# Patient Record
Sex: FEMALE | Race: WHITE | NOT HISPANIC OR LATINO | Employment: OTHER | URBAN - METROPOLITAN AREA
[De-identification: names, ages, dates, MRNs, and addresses within clinical notes are randomized per-mention and may not be internally consistent; named-entity substitution may affect disease eponyms.]

---

## 2021-04-15 LAB — SARS-COV-2 RNA RESP QL NAA+PROBE: NEGATIVE

## 2022-01-06 ENCOUNTER — HISTORICAL (OUTPATIENT)
Dept: RADIOLOGY | Facility: HOSPITAL | Age: 62
End: 2022-01-06

## 2022-01-31 ENCOUNTER — HISTORICAL (OUTPATIENT)
Dept: RADIOLOGY | Facility: HOSPITAL | Age: 62
End: 2022-01-31

## 2022-01-31 ENCOUNTER — HISTORICAL (OUTPATIENT)
Dept: ADMINISTRATIVE | Facility: HOSPITAL | Age: 62
End: 2022-01-31

## 2022-04-10 ENCOUNTER — HISTORICAL (OUTPATIENT)
Dept: ADMINISTRATIVE | Facility: HOSPITAL | Age: 62
End: 2022-04-10
Payer: COMMERCIAL

## 2022-04-25 VITALS
DIASTOLIC BLOOD PRESSURE: 75 MMHG | WEIGHT: 256.19 LBS | BODY MASS INDEX: 40.21 KG/M2 | HEIGHT: 67 IN | OXYGEN SATURATION: 97 % | SYSTOLIC BLOOD PRESSURE: 116 MMHG

## 2022-05-16 ENCOUNTER — TELEPHONE (OUTPATIENT)
Dept: SURGERY | Facility: CLINIC | Age: 62
End: 2022-05-16

## 2022-06-02 ENCOUNTER — HOSPITAL ENCOUNTER (OUTPATIENT)
Dept: RADIOLOGY | Facility: HOSPITAL | Age: 62
Discharge: HOME | End: 2022-06-02
Attending: FAMILY MEDICINE
Payer: COMMERCIAL

## 2022-06-02 DIAGNOSIS — M54.50 LOW BACK PAIN: ICD-10-CM

## 2022-06-02 PROCEDURE — 72100 X-RAY EXAM L-S SPINE 2/3 VWS: CPT | Mod: TC

## 2022-06-09 DIAGNOSIS — M54.16 RADICULOPATHY, LUMBAR REGION: Primary | ICD-10-CM

## 2022-06-13 NOTE — H&P (VIEW-ONLY)
"[facility] Breast Surg  Breast Surgical Oncology  Follow-Up Patient Office Visit       Referring Provider: No ref. provider found  PCP: Kalli Blanchard MD   Care Team: No care team member to display No care team member to display     Chief Complaint:   Chief Complaint   Patient presents with    Follow-up    Breast Mass     Followup for right breast mass, nipple discharge, clear yellowish, pt stated that she noticed a bloody discharge x 1 week ago, no pain        Subjective:   Treatment History:  None      Interval History:  2022 - Marina Dixon presents today for follow up for right nipple discharge. She still has some drainage from the right nipple. It usually is clear/yellow, but states the last time she checked it was bloody.    HPI:  Marina Dixon initially presented on 2020 with concern for right nipple discharge. She states she first noticed clear right nipple discharge about 1-2 months prior. It is not spontaneous and requires manipulation. She expressed the discharge from the nipple and it became bloody (or what she thought was blood). She states she was seen and evaluated by Dr. Santos (at this time not able to express any discharge) who recommended an ultrasound. She has DG MG and US of the right breast at [facility] on 20 and they were both negative for any findings. She had not had discharge since the first occurrence until a few days after her mammogram at [facility]. She has discharge after she "squeezed the nipple".      Imagin. 2020 BL SC MF at [facility] - which revealed scattered fibroglandular densities, benign calcifications and no suspicious masses, calcifications, or other findings. BIRADS-2: benign.  2. 2020 R DG MG/ US at [facility] - which revealed heterogenously dense breast tissue. No suspicious masses, calcifications, or densities are seen including the subareolar region. Right US did not reveal " any suspicious cystic or solid masses or ductal structures seen in the subareolar right breast. Lymph nodes are normal in the right axilla. No evidence of breast malignancy. No interval changes in appearance. BIRADS 2- Benign  3. 1/5/2021 BL DG MG / R US at [facility] - which revealed no concerning masses, calcifications, or distortions in either breast. R US revealed no evidence of ductal dilatation, ectasia, cyst, mass, or suspicious findings. BI-RADS 1: negative  4. 1/6/2022 BL DG MG/R US BREAST LIMITED at [facility] - which revealed on MG there is no correlate for the right nipple discharge. No significant masses, calcifications, or other findings are seen in either breast. There has been no significant interval change. On R US there is a 1.1 x 0.2 x 0.2 cm solid vascular hypoechoic intraductal mass in the subareolar position. The proximal section of the duct in which the solid mass resides is dilated with anechoic fluid leading up to the nipple. This correlates with the patient's right nipple discharge and is suspicious. No suspicious right axillary adenopathy. BIRADS-4 suspicious; biopsy recommended.        Pathology:  1. 1/31/2022 Ultrasound-guided Core Needle Biopsy of Right Breast Subareolar Intraductal Mass Posterior to Nipple - Non-proliferative fibrocystic changes, mild, non-specific. No evidence of atypia or malignancy      OB/GYN History:  Menarche Onset: 10  Menopause: Post, at age: 42  Hormonal birth control (duration): 18 years  Pregnancies: 2  Age at first child birth: 23  Child births: 2  Hysterectomy: yes, at age 42  Oophorectomy: yes, at age 42  HRT: HRT starting at 42 and stopping at 60 (estrogen alone)      Other Relevant History:  # of breast biopsies (when and pathology results): none  MG breast density: Category B (Scattered fibroglandular)  Prior thoracic RT: none  Genetic testing: none  Ashkenazi Sikhism descent: No      Family History:  - Father: prostate cancer (64)  - Brother: Prostate  (69)  - Brother: Kidney cancer (45)       Past History:  Past Medical History:   Diagnosis Date    Endometriosis of uterus 2002    Hypertension 2020    Menopause 2002    Hysterectomy        Past Surgical History:   Procedure Laterality Date    HYSTERECTOMY  2002        Social History     Socioeconomic History    Marital status:    Tobacco Use    Smoking status: Never Smoker    Smokeless tobacco: Never Used   Substance and Sexual Activity    Alcohol use: Never    Drug use: Never    Sexual activity: Yes     Partners: Male     Birth control/protection: None        Body mass index is 40.42 kg/m².     Allergy/Medications:   Review of patient's allergies indicates:   Allergen Reactions    Sulfa (sulfonamide antibiotics) Itching and Rash          Current Outpatient Medications:     cholecalciferol, vitamin D3, 125 mcg (5,000 unit) capsule, as directed, Disp: , Rfl:     conjugated estrogens (PREMARIN) vaginal cream, as directed, Disp: , Rfl:     docusate sodium (COLACE) 100 MG capsule, 1 capsule as needed, Disp: , Rfl:     DULoxetine (CYMBALTA) 60 MG capsule, Take 1 capsule by mouth once daily., Disp: , Rfl:     gabapentin (NEURONTIN) 600 MG tablet, 2 tablet, Disp: , Rfl:     indomethacin (INDOCIN SR) 75 mg CpSR CR capsule, Take 1 capsule by mouth 2 (two) times daily., Disp: , Rfl:     L.acidoph,plant-B.animal,long (PROBIOTIC ACIDOPHILUS BEADS) 2 billion cell Cap, , Disp: , Rfl:     methocarbamoL (ROBAXIN) 500 MG Tab, TAKE ONE TABLET BY MOUTH EVERY 8 HOURS FOR 15 DAYS, Disp: , Rfl:     mupirocin (BACTROBAN) 2 % ointment, 1 application, Disp: , Rfl:     omega 3-dha-epa-fish oil 1,000 mg (120 mg-180 mg) Cap, , Disp: , Rfl:     omeprazole (PRILOSEC) 20 MG capsule, 1 capsule 30 minutes before morning meal, Disp: , Rfl:     pilocarpine (SALAGEN) 5 MG Tab, 1 tablet, Disp: , Rfl:     polyethylene glycol (GLYCOLAX) 17 gram/dose powder, , Disp: , Rfl:     triamterene-hydrochlorothiazide 37.5-25 mg  "(MAXZIDE-25) 37.5-25 mg per tablet, Take 1 tablet by mouth every morning., Disp: , Rfl:        Review of Systems:  Constitutional: denies fevers, chills, weight loss  HEENT: denies blurry/double vision, changes in hearing, odynophagia, dysphagia  Respiratory: denies cough, shortness of breath  Cardiovascular: denies palpitations, swelling of the extremities  GI: denies abdominal pain, nausea/vomiting, hematochezia, frequent stools  : denies frequency, dysuria, flank pain, hematuria  Skin: denies new rashes  Neurological: denies muscular/sensory deficiencies, loss of coordination, headaches, memory changes  Endo: denies hair loss/thinning, nervousness, hot flashes, heat/cold intolerance, lumps in the neck area  Heme: denies easy bruising and fatigue  Psychological: denies anxious/depressive moods  Musculoskeletal: denies bony pain, muscle cramps, swollen joints    Objective:     Vitals:  Blood pressure 134/76, pulse 78, temperature 98.6 °F (37 °C), temperature source Oral, resp. rate 20, height 5' 6" (1.676 m), weight 113.6 kg (250 lb 6.4 oz), SpO2 96 %.      Physical Exam:  General: The patient is awake, alert and oriented times three. The patient is well nourished and in no acute distress.   Neck: There is no evidence of palpable cervical, supraclavicular or axillary adenopathy. The neck is supple. The thyroid is not enlarged.   Musculoskeletal: The patient has a normal range of motion of her bilateral upper extremities.   Chest: Examination of the chest wall fails to reveal any obvious abnormalities.  The lungs are clear to auscultation bilaterally without rales, rhonchi, or wheezing.   Cardiovascular: The heart has a regular rate and rhythm without murmurs, gallops or rubs.   Breast: Examination of right breast fails to reveal any dominant masses or areas of significant focal nodularity. The nipple has a single duct with yellow clear discharge. There is no skin dimpling with movement of the pectoralis. There is " "a small resolving hematoma from recent biopsy. Examination of the left breast fails to reveal any dominant masses or areas of significant focal nodularity. The nipple is everted without evidence of discharge. There is no skin dimpling with movement of the pectoralis. There is no significant skin changes overlying the breast.  Abdomen: The abdomen is soft, flat, nontender and nondistended with no palpable masses or organomegaly.  Integumentary: no rashes or skin lesions present  Neurologic: cranial nerves intact, no signs of peripheral neurological deficit, motor/sensory function intact    Assessment and Plan:     Encounter Diagnoses   Name Primary?    Bloody discharge from right nipple Yes    Fibrocystic breast changes of both breasts       Marina Dixon initially presented on 8/18/2020 with concern for right nipple discharge. She states she first noticed clear right nipple discharge about 1-2 months prior. It is not spontaneous and requires manipulation. She expressed the discharge from the nipple and it became bloody (or what she thought was blood). She states she was seen and evaluated by Dr. Santos (at this time not able to express any discharge) who recommended an ultrasound. She has DG MG and US of the right breast at [facility] on 7/27/20 and they were both negative for any findings. She had not had discharge since the first occurrence until a few days after her mammogram at [facility]. She has discharge after she "squeezed the nipple".        She is now status post core biosy revealing benign fibrocystic findings. She still continues to have nipple discharge.    At this time, the recommendation is for a major duct excision via localization with lacrimal probe. We discussed the surgical procedure in detail. The general operative procedure of mastectomy and lumpectomy were discussed in detail. The surgical complications of either procedure, i.e., pain, bleeding, hematoma, seroma, " potential need for additional surgery, and infection were addressed in detail.     We discussed this being an outpatient procedure.        Plan:     1. Surgery - Right breast Major duct excision for right nipple discharge  Tentative Date: 6/27/2022  2. Preop - BMP, CBC, EKG  3. Surgical policies and instructions discussed    All of questions were answered    Sintia Red MD  Breast Surgical Oncology     OFFICE VISIT CODING:    Non-face-to-face time included:  Yes Preparing to see the patient such as reviewing the patient record  Yes Obtaining and reviewing separately obtained history  No Independently interpreting results  Yes Documenting clinical information in electronic health record  No Ordering appropriate medications  Yes Ordering appropriate tests  Yes Ordering appropriate procedures (including follow-up)  No Referring and communicating with other health care professionals (not separately reported)  Yes Care Coordination (not separately reported)    Face-to-face time included:  Yes Performing a medically necessary appropriate history, examination, and/or evaluation  Yes Communicating results to the patient/family/caregiver  Yes Counseling and educating the patient/family/caregiver  Yes Answering patient/family/caregiver questions    Total Time: 41 minutes    Total time includes both face-to-face and non-face-to-face time personally spent by myself on the day of the visit.

## 2022-06-13 NOTE — PROGRESS NOTES
"[facility] Breast Surg  Breast Surgical Oncology  Follow-Up Patient Office Visit       Referring Provider: No ref. provider found  PCP: Kalli Blanchard MD   Care Team: No care team member to display No care team member to display     Chief Complaint:   Chief Complaint   Patient presents with    Follow-up    Breast Mass     Followup for right breast mass, nipple discharge, clear yellowish, pt stated that she noticed a bloody discharge x 1 week ago, no pain        Subjective:   Treatment History:  None      Interval History:  2022 - Marina Dixon presents today for follow up for right nipple discharge. She still has some drainage from the right nipple. It usually is clear/yellow, but states the last time she checked it was bloody.    HPI:  Marina Dixon initially presented on 2020 with concern for right nipple discharge. She states she first noticed clear right nipple discharge about 1-2 months prior. It is not spontaneous and requires manipulation. She expressed the discharge from the nipple and it became bloody (or what she thought was blood). She states she was seen and evaluated by Dr. Santos (at this time not able to express any discharge) who recommended an ultrasound. She has DG MG and US of the right breast at [facility] on 20 and they were both negative for any findings. She had not had discharge since the first occurrence until a few days after her mammogram at [facility]. She has discharge after she "squeezed the nipple".      Imagin. 2020 BL SC MF at [facility] - which revealed scattered fibroglandular densities, benign calcifications and no suspicious masses, calcifications, or other findings. BIRADS-2: benign.  2. 2020 R DG MG/ US at [facility] - which revealed heterogenously dense breast tissue. No suspicious masses, calcifications, or densities are seen including the subareolar region. Right US did not reveal " any suspicious cystic or solid masses or ductal structures seen in the subareolar right breast. Lymph nodes are normal in the right axilla. No evidence of breast malignancy. No interval changes in appearance. BIRADS 2- Benign  3. 1/5/2021 BL DG MG / R US at [facility] - which revealed no concerning masses, calcifications, or distortions in either breast. R US revealed no evidence of ductal dilatation, ectasia, cyst, mass, or suspicious findings. BI-RADS 1: negative  4. 1/6/2022 BL DG MG/R US BREAST LIMITED at [facility] - which revealed on MG there is no correlate for the right nipple discharge. No significant masses, calcifications, or other findings are seen in either breast. There has been no significant interval change. On R US there is a 1.1 x 0.2 x 0.2 cm solid vascular hypoechoic intraductal mass in the subareolar position. The proximal section of the duct in which the solid mass resides is dilated with anechoic fluid leading up to the nipple. This correlates with the patient's right nipple discharge and is suspicious. No suspicious right axillary adenopathy. BIRADS-4 suspicious; biopsy recommended.        Pathology:  1. 1/31/2022 Ultrasound-guided Core Needle Biopsy of Right Breast Subareolar Intraductal Mass Posterior to Nipple - Non-proliferative fibrocystic changes, mild, non-specific. No evidence of atypia or malignancy      OB/GYN History:  Menarche Onset: 10  Menopause: Post, at age: 42  Hormonal birth control (duration): 18 years  Pregnancies: 2  Age at first child birth: 23  Child births: 2  Hysterectomy: yes, at age 42  Oophorectomy: yes, at age 42  HRT: HRT starting at 42 and stopping at 60 (estrogen alone)      Other Relevant History:  # of breast biopsies (when and pathology results): none  MG breast density: Category B (Scattered fibroglandular)  Prior thoracic RT: none  Genetic testing: none  Ashkenazi Synagogue descent: No      Family History:  - Father: prostate cancer (64)  - Brother: Prostate  (69)  - Brother: Kidney cancer (45)       Past History:  Past Medical History:   Diagnosis Date    Endometriosis of uterus 2002    Hypertension 2020    Menopause 2002    Hysterectomy        Past Surgical History:   Procedure Laterality Date    HYSTERECTOMY  2002        Social History     Socioeconomic History    Marital status:    Tobacco Use    Smoking status: Never Smoker    Smokeless tobacco: Never Used   Substance and Sexual Activity    Alcohol use: Never    Drug use: Never    Sexual activity: Yes     Partners: Male     Birth control/protection: None        Body mass index is 40.42 kg/m².     Allergy/Medications:   Review of patient's allergies indicates:   Allergen Reactions    Sulfa (sulfonamide antibiotics) Itching and Rash          Current Outpatient Medications:     cholecalciferol, vitamin D3, 125 mcg (5,000 unit) capsule, as directed, Disp: , Rfl:     conjugated estrogens (PREMARIN) vaginal cream, as directed, Disp: , Rfl:     docusate sodium (COLACE) 100 MG capsule, 1 capsule as needed, Disp: , Rfl:     DULoxetine (CYMBALTA) 60 MG capsule, Take 1 capsule by mouth once daily., Disp: , Rfl:     gabapentin (NEURONTIN) 600 MG tablet, 2 tablet, Disp: , Rfl:     indomethacin (INDOCIN SR) 75 mg CpSR CR capsule, Take 1 capsule by mouth 2 (two) times daily., Disp: , Rfl:     L.acidoph,plant-B.animal,long (PROBIOTIC ACIDOPHILUS BEADS) 2 billion cell Cap, , Disp: , Rfl:     methocarbamoL (ROBAXIN) 500 MG Tab, TAKE ONE TABLET BY MOUTH EVERY 8 HOURS FOR 15 DAYS, Disp: , Rfl:     mupirocin (BACTROBAN) 2 % ointment, 1 application, Disp: , Rfl:     omega 3-dha-epa-fish oil 1,000 mg (120 mg-180 mg) Cap, , Disp: , Rfl:     omeprazole (PRILOSEC) 20 MG capsule, 1 capsule 30 minutes before morning meal, Disp: , Rfl:     pilocarpine (SALAGEN) 5 MG Tab, 1 tablet, Disp: , Rfl:     polyethylene glycol (GLYCOLAX) 17 gram/dose powder, , Disp: , Rfl:     triamterene-hydrochlorothiazide 37.5-25 mg  "(MAXZIDE-25) 37.5-25 mg per tablet, Take 1 tablet by mouth every morning., Disp: , Rfl:        Review of Systems:  Constitutional: denies fevers, chills, weight loss  HEENT: denies blurry/double vision, changes in hearing, odynophagia, dysphagia  Respiratory: denies cough, shortness of breath  Cardiovascular: denies palpitations, swelling of the extremities  GI: denies abdominal pain, nausea/vomiting, hematochezia, frequent stools  : denies frequency, dysuria, flank pain, hematuria  Skin: denies new rashes  Neurological: denies muscular/sensory deficiencies, loss of coordination, headaches, memory changes  Endo: denies hair loss/thinning, nervousness, hot flashes, heat/cold intolerance, lumps in the neck area  Heme: denies easy bruising and fatigue  Psychological: denies anxious/depressive moods  Musculoskeletal: denies bony pain, muscle cramps, swollen joints    Objective:     Vitals:  Blood pressure 134/76, pulse 78, temperature 98.6 °F (37 °C), temperature source Oral, resp. rate 20, height 5' 6" (1.676 m), weight 113.6 kg (250 lb 6.4 oz), SpO2 96 %.      Physical Exam:  General: The patient is awake, alert and oriented times three. The patient is well nourished and in no acute distress.   Neck: There is no evidence of palpable cervical, supraclavicular or axillary adenopathy. The neck is supple. The thyroid is not enlarged.   Musculoskeletal: The patient has a normal range of motion of her bilateral upper extremities.   Chest: Examination of the chest wall fails to reveal any obvious abnormalities.  The lungs are clear to auscultation bilaterally without rales, rhonchi, or wheezing.   Cardiovascular: The heart has a regular rate and rhythm without murmurs, gallops or rubs.   Breast: Examination of right breast fails to reveal any dominant masses or areas of significant focal nodularity. The nipple has a single duct with yellow clear discharge. There is no skin dimpling with movement of the pectoralis. There is " "a small resolving hematoma from recent biopsy. Examination of the left breast fails to reveal any dominant masses or areas of significant focal nodularity. The nipple is everted without evidence of discharge. There is no skin dimpling with movement of the pectoralis. There is no significant skin changes overlying the breast.  Abdomen: The abdomen is soft, flat, nontender and nondistended with no palpable masses or organomegaly.  Integumentary: no rashes or skin lesions present  Neurologic: cranial nerves intact, no signs of peripheral neurological deficit, motor/sensory function intact    Assessment and Plan:     Encounter Diagnoses   Name Primary?    Bloody discharge from right nipple Yes    Fibrocystic breast changes of both breasts       Marina Dixon initially presented on 8/18/2020 with concern for right nipple discharge. She states she first noticed clear right nipple discharge about 1-2 months prior. It is not spontaneous and requires manipulation. She expressed the discharge from the nipple and it became bloody (or what she thought was blood). She states she was seen and evaluated by Dr. Santos (at this time not able to express any discharge) who recommended an ultrasound. She has DG MG and US of the right breast at [facility] on 7/27/20 and they were both negative for any findings. She had not had discharge since the first occurrence until a few days after her mammogram at [facility]. She has discharge after she "squeezed the nipple".        She is now status post core biosy revealing benign fibrocystic findings. She still continues to have nipple discharge.    At this time, the recommendation is for a major duct excision via localization with lacrimal probe. We discussed the surgical procedure in detail. The general operative procedure of mastectomy and lumpectomy were discussed in detail. The surgical complications of either procedure, i.e., pain, bleeding, hematoma, seroma, " potential need for additional surgery, and infection were addressed in detail.     We discussed this being an outpatient procedure.        Plan:     1. Surgery - Right breast Major duct excision for right nipple discharge  Tentative Date: 6/27/2022  2. Preop - BMP, CBC, EKG  3. Surgical policies and instructions discussed    All of questions were answered    Sintia Red MD  Breast Surgical Oncology     OFFICE VISIT CODING:    Non-face-to-face time included:  Yes Preparing to see the patient such as reviewing the patient record  Yes Obtaining and reviewing separately obtained history  No Independently interpreting results  Yes Documenting clinical information in electronic health record  No Ordering appropriate medications  Yes Ordering appropriate tests  Yes Ordering appropriate procedures (including follow-up)  No Referring and communicating with other health care professionals (not separately reported)  Yes Care Coordination (not separately reported)    Face-to-face time included:  Yes Performing a medically necessary appropriate history, examination, and/or evaluation  Yes Communicating results to the patient/family/caregiver  Yes Counseling and educating the patient/family/caregiver  Yes Answering patient/family/caregiver questions    Total Time: 41 minutes    Total time includes both face-to-face and non-face-to-face time personally spent by myself on the day of the visit.

## 2022-06-14 ENCOUNTER — OFFICE VISIT (OUTPATIENT)
Dept: SURGERY | Facility: CLINIC | Age: 62
End: 2022-06-14
Payer: COMMERCIAL

## 2022-06-14 VITALS
HEIGHT: 66 IN | OXYGEN SATURATION: 96 % | HEART RATE: 78 BPM | DIASTOLIC BLOOD PRESSURE: 76 MMHG | RESPIRATION RATE: 20 BRPM | TEMPERATURE: 99 F | BODY MASS INDEX: 40.24 KG/M2 | SYSTOLIC BLOOD PRESSURE: 134 MMHG | WEIGHT: 250.38 LBS

## 2022-06-14 DIAGNOSIS — N60.12 FIBROCYSTIC BREAST CHANGES OF BOTH BREASTS: ICD-10-CM

## 2022-06-14 DIAGNOSIS — N64.52 BLOODY DISCHARGE FROM RIGHT NIPPLE: Primary | ICD-10-CM

## 2022-06-14 DIAGNOSIS — N60.11 FIBROCYSTIC BREAST CHANGES OF BOTH BREASTS: ICD-10-CM

## 2022-06-14 PROCEDURE — 3078F DIAST BP <80 MM HG: CPT | Mod: CPTII,S$GLB,, | Performed by: SURGERY

## 2022-06-14 PROCEDURE — 1159F PR MEDICATION LIST DOCUMENTED IN MEDICAL RECORD: ICD-10-PCS | Mod: CPTII,S$GLB,, | Performed by: SURGERY

## 2022-06-14 PROCEDURE — 3075F SYST BP GE 130 - 139MM HG: CPT | Mod: CPTII,S$GLB,, | Performed by: SURGERY

## 2022-06-14 PROCEDURE — 1160F RVW MEDS BY RX/DR IN RCRD: CPT | Mod: CPTII,S$GLB,, | Performed by: SURGERY

## 2022-06-14 PROCEDURE — 3008F PR BODY MASS INDEX (BMI) DOCUMENTED: ICD-10-PCS | Mod: CPTII,S$GLB,, | Performed by: SURGERY

## 2022-06-14 PROCEDURE — 99215 PR OFFICE/OUTPT VISIT, EST, LEVL V, 40-54 MIN: ICD-10-PCS | Mod: S$GLB,,, | Performed by: SURGERY

## 2022-06-14 PROCEDURE — 3075F PR MOST RECENT SYSTOLIC BLOOD PRESS GE 130-139MM HG: ICD-10-PCS | Mod: CPTII,S$GLB,, | Performed by: SURGERY

## 2022-06-14 PROCEDURE — 1159F MED LIST DOCD IN RCRD: CPT | Mod: CPTII,S$GLB,, | Performed by: SURGERY

## 2022-06-14 PROCEDURE — 3078F PR MOST RECENT DIASTOLIC BLOOD PRESSURE < 80 MM HG: ICD-10-PCS | Mod: CPTII,S$GLB,, | Performed by: SURGERY

## 2022-06-14 PROCEDURE — 99215 OFFICE O/P EST HI 40 MIN: CPT | Mod: S$GLB,,, | Performed by: SURGERY

## 2022-06-14 PROCEDURE — 3008F BODY MASS INDEX DOCD: CPT | Mod: CPTII,S$GLB,, | Performed by: SURGERY

## 2022-06-14 PROCEDURE — 99999 PR PBB SHADOW E&M-EST. PATIENT-LVL V: ICD-10-PCS | Mod: PBBFAC,,, | Performed by: SURGERY

## 2022-06-14 PROCEDURE — 99999 PR PBB SHADOW E&M-EST. PATIENT-LVL V: CPT | Mod: PBBFAC,,, | Performed by: SURGERY

## 2022-06-14 PROCEDURE — 1160F PR REVIEW ALL MEDS BY PRESCRIBER/CLIN PHARMACIST DOCUMENTED: ICD-10-PCS | Mod: CPTII,S$GLB,, | Performed by: SURGERY

## 2022-06-14 RX ORDER — TRIAMTERENE/HYDROCHLOROTHIAZID 37.5-25 MG
1 TABLET ORAL EVERY MORNING
Status: ON HOLD | COMMUNITY
End: 2022-08-30 | Stop reason: SDUPTHER

## 2022-06-14 RX ORDER — GABAPENTIN 600 MG/1
1200 TABLET ORAL NIGHTLY
Status: ON HOLD | COMMUNITY
Start: 2022-02-17 | End: 2022-08-30 | Stop reason: HOSPADM

## 2022-06-14 RX ORDER — METHOCARBAMOL 500 MG/1
500 TABLET, FILM COATED ORAL 3 TIMES DAILY
Status: ON HOLD | COMMUNITY
End: 2022-08-30 | Stop reason: HOSPADM

## 2022-06-14 RX ORDER — POLYETHYLENE GLYCOL 3350 17 G/17G
17 POWDER, FOR SOLUTION ORAL DAILY PRN
Status: ON HOLD | COMMUNITY
End: 2022-08-30 | Stop reason: HOSPADM

## 2022-06-14 RX ORDER — OMEPRAZOLE 20 MG/1
20 CAPSULE, DELAYED RELEASE ORAL DAILY
Status: ON HOLD | COMMUNITY
End: 2022-08-30 | Stop reason: SDUPTHER

## 2022-06-14 RX ORDER — L.ACIDOPH,PLANT/B.ANIMAL,LONG 2B CELL
2 CAPSULE ORAL DAILY
Status: ON HOLD | COMMUNITY
End: 2022-08-30 | Stop reason: HOSPADM

## 2022-06-14 RX ORDER — MUPIROCIN 20 MG/G
OINTMENT TOPICAL
Status: ON HOLD | COMMUNITY
Start: 2021-12-21 | End: 2022-08-30 | Stop reason: HOSPADM

## 2022-06-14 RX ORDER — GLUCOSAM/CHONDRO/HERB 149/HYAL 750-100 MG
2 TABLET ORAL DAILY
Status: ON HOLD | COMMUNITY
End: 2022-08-19 | Stop reason: HOSPADM

## 2022-06-14 RX ORDER — DULOXETIN HYDROCHLORIDE 60 MG/1
1 CAPSULE, DELAYED RELEASE ORAL DAILY
Status: ON HOLD | COMMUNITY
Start: 2022-02-17 | End: 2022-08-30 | Stop reason: SDUPTHER

## 2022-06-14 RX ORDER — DOCUSATE SODIUM 100 MG/1
100 CAPSULE, LIQUID FILLED ORAL 2 TIMES DAILY PRN
Status: ON HOLD | COMMUNITY
Start: 2022-02-22 | End: 2022-08-30 | Stop reason: HOSPADM

## 2022-06-14 RX ORDER — PILOCARPINE HYDROCHLORIDE 5 MG/1
10 TABLET, FILM COATED ORAL 3 TIMES DAILY
Status: ON HOLD | COMMUNITY
End: 2022-08-30 | Stop reason: HOSPADM

## 2022-06-14 RX ORDER — CHOLECALCIFEROL (VITAMIN D3) 125 MCG
5000 CAPSULE ORAL DAILY
Status: ON HOLD | COMMUNITY
End: 2022-08-30 | Stop reason: SDUPTHER

## 2022-06-14 RX ORDER — INDOMETHACIN 75 MG/1
1 CAPSULE, EXTENDED RELEASE ORAL 2 TIMES DAILY
Status: ON HOLD | COMMUNITY
End: 2022-08-19 | Stop reason: HOSPADM

## 2022-06-14 RX ORDER — CONJUGATED ESTROGENS 0.62 MG/G
1 CREAM VAGINAL
COMMUNITY

## 2022-06-15 PROBLEM — N60.11 FIBROCYSTIC BREAST CHANGES OF BOTH BREASTS: Status: ACTIVE | Noted: 2022-06-15

## 2022-06-15 PROBLEM — N64.52 BLOODY DISCHARGE FROM RIGHT NIPPLE: Status: ACTIVE | Noted: 2022-06-15

## 2022-06-15 PROBLEM — N60.12 FIBROCYSTIC BREAST CHANGES OF BOTH BREASTS: Status: ACTIVE | Noted: 2022-06-15

## 2022-06-15 RX ORDER — SODIUM CHLORIDE, SODIUM LACTATE, POTASSIUM CHLORIDE, CALCIUM CHLORIDE 600; 310; 30; 20 MG/100ML; MG/100ML; MG/100ML; MG/100ML
INJECTION, SOLUTION INTRAVENOUS CONTINUOUS
Status: CANCELLED | OUTPATIENT
Start: 2022-06-15

## 2022-06-21 ENCOUNTER — HOSPITAL ENCOUNTER (OUTPATIENT)
Dept: RADIOLOGY | Facility: HOSPITAL | Age: 62
Discharge: HOME | End: 2022-06-21
Attending: FAMILY MEDICINE
Payer: COMMERCIAL

## 2022-06-21 DIAGNOSIS — M54.16 RADICULOPATHY, LUMBAR REGION: ICD-10-CM

## 2022-06-21 PROCEDURE — 72148 MRI LUMBAR SPINE W/O DYE: CPT | Mod: TC

## 2022-06-22 DIAGNOSIS — Z01.818 OTHER SPECIFIED PRE-OPERATIVE EXAMINATION: Primary | ICD-10-CM

## 2022-06-23 ENCOUNTER — LAB VISIT (OUTPATIENT)
Dept: LAB | Facility: HOSPITAL | Age: 62
End: 2022-06-23
Attending: SURGERY
Payer: COMMERCIAL

## 2022-06-23 ENCOUNTER — CLINICAL SUPPORT (OUTPATIENT)
Dept: PREADMISSION TESTING | Facility: HOSPITAL | Age: 62
End: 2022-06-23
Attending: SURGERY
Payer: COMMERCIAL

## 2022-06-23 DIAGNOSIS — N64.52 BLOODY DISCHARGE FROM RIGHT NIPPLE: ICD-10-CM

## 2022-06-23 DIAGNOSIS — Z01.818 OTHER SPECIFIED PRE-OPERATIVE EXAMINATION: ICD-10-CM

## 2022-06-23 LAB
ANION GAP SERPL CALC-SCNC: 12 MEQ/L
BASOPHILS # BLD AUTO: 0.02 X10(3)/MCL (ref 0–0.2)
BASOPHILS NFR BLD AUTO: 0.4 %
BUN SERPL-MCNC: 16.5 MG/DL (ref 9.8–20.1)
CALCIUM SERPL-MCNC: 9.4 MG/DL (ref 8.4–10.2)
CHLORIDE SERPL-SCNC: 100 MMOL/L (ref 98–107)
CO2 SERPL-SCNC: 29 MMOL/L (ref 23–31)
CREAT SERPL-MCNC: 1.14 MG/DL (ref 0.55–1.02)
CREAT/UREA NIT SERPL: 14
EOSINOPHIL # BLD AUTO: 0.15 X10(3)/MCL (ref 0–0.9)
EOSINOPHIL NFR BLD AUTO: 3.3 %
ERYTHROCYTE [DISTWIDTH] IN BLOOD BY AUTOMATED COUNT: 13.1 % (ref 11.5–17)
GLUCOSE SERPL-MCNC: 92 MG/DL (ref 82–115)
HCT VFR BLD AUTO: 42.5 % (ref 37–47)
HGB BLD-MCNC: 13.6 GM/DL (ref 12–16)
IMM GRANULOCYTES # BLD AUTO: 0.02 X10(3)/MCL (ref 0–0.02)
IMM GRANULOCYTES NFR BLD AUTO: 0.4 % (ref 0–0.43)
LYMPHOCYTES # BLD AUTO: 1.72 X10(3)/MCL (ref 0.6–4.6)
LYMPHOCYTES NFR BLD AUTO: 37.4 %
MCH RBC QN AUTO: 29.2 PG (ref 27–31)
MCHC RBC AUTO-ENTMCNC: 32 MG/DL (ref 33–36)
MCV RBC AUTO: 91.2 FL (ref 80–94)
MONOCYTES # BLD AUTO: 0.42 X10(3)/MCL (ref 0.1–1.3)
MONOCYTES NFR BLD AUTO: 9.1 %
NEUTROPHILS # BLD AUTO: 2.3 X10(3)/MCL (ref 2.1–9.2)
NEUTROPHILS NFR BLD AUTO: 49.4 %
NRBC BLD AUTO-RTO: 0 %
PLATELET # BLD AUTO: 262 X10(3)/MCL (ref 130–400)
PMV BLD AUTO: 9.9 FL (ref 9.4–12.4)
POTASSIUM SERPL-SCNC: 3.2 MMOL/L (ref 3.5–5.1)
RBC # BLD AUTO: 4.66 X10(6)/MCL (ref 4.2–5.4)
SODIUM SERPL-SCNC: 141 MMOL/L (ref 136–145)
WBC # SPEC AUTO: 4.6 X10(3)/MCL (ref 4.5–11.5)

## 2022-06-23 PROCEDURE — 93010 EKG 12-LEAD: ICD-10-PCS | Mod: ,,, | Performed by: INTERNAL MEDICINE

## 2022-06-23 PROCEDURE — 85025 COMPLETE CBC W/AUTO DIFF WBC: CPT | Performed by: SURGERY

## 2022-06-23 PROCEDURE — 93005 ELECTROCARDIOGRAM TRACING: CPT

## 2022-06-23 PROCEDURE — 93010 ELECTROCARDIOGRAM REPORT: CPT | Mod: ,,, | Performed by: INTERNAL MEDICINE

## 2022-06-23 PROCEDURE — 80048 BASIC METABOLIC PNL TOTAL CA: CPT | Performed by: SURGERY

## 2022-06-23 PROCEDURE — 36415 COLL VENOUS BLD VENIPUNCTURE: CPT

## 2022-06-23 RX ORDER — TRAMADOL HYDROCHLORIDE 50 MG/1
50 TABLET ORAL EVERY 12 HOURS PRN
Status: ON HOLD | COMMUNITY
End: 2022-08-30 | Stop reason: HOSPADM

## 2022-06-25 ENCOUNTER — ANESTHESIA EVENT (OUTPATIENT)
Dept: SURGERY | Facility: HOSPITAL | Age: 62
End: 2022-06-25
Payer: COMMERCIAL

## 2022-06-27 ENCOUNTER — HOSPITAL ENCOUNTER (OUTPATIENT)
Facility: HOSPITAL | Age: 62
Discharge: HOME | End: 2022-06-27
Attending: SURGERY | Admitting: SURGERY
Payer: COMMERCIAL

## 2022-06-27 ENCOUNTER — ANESTHESIA (OUTPATIENT)
Dept: SURGERY | Facility: HOSPITAL | Age: 62
End: 2022-06-27
Payer: COMMERCIAL

## 2022-06-27 DIAGNOSIS — Z98.890 STATUS POST EXCISIONAL BIOPSY: Primary | ICD-10-CM

## 2022-06-27 DIAGNOSIS — N64.52 BLOODY DISCHARGE FROM RIGHT NIPPLE: ICD-10-CM

## 2022-06-27 PROCEDURE — 37000009 HC ANESTHESIA EA ADD 15 MINS: Performed by: SURGERY

## 2022-06-27 PROCEDURE — 25000003 PHARM REV CODE 250: Performed by: ANESTHESIOLOGY

## 2022-06-27 PROCEDURE — 36000706: Performed by: SURGERY

## 2022-06-27 PROCEDURE — 36000707: Performed by: SURGERY

## 2022-06-27 PROCEDURE — 25000003 PHARM REV CODE 250: Performed by: SURGERY

## 2022-06-27 PROCEDURE — 71000033 HC RECOVERY, INTIAL HOUR: Performed by: SURGERY

## 2022-06-27 PROCEDURE — 37000008 HC ANESTHESIA 1ST 15 MINUTES: Performed by: SURGERY

## 2022-06-27 PROCEDURE — 19120 REMOVAL OF BREAST LESION: CPT | Mod: RT,,, | Performed by: SURGERY

## 2022-06-27 PROCEDURE — 63600175 PHARM REV CODE 636 W HCPCS: Performed by: SURGERY

## 2022-06-27 PROCEDURE — A4216 STERILE WATER/SALINE, 10 ML: HCPCS | Performed by: SURGERY

## 2022-06-27 PROCEDURE — 71000015 HC POSTOP RECOV 1ST HR: Performed by: SURGERY

## 2022-06-27 PROCEDURE — 63600175 PHARM REV CODE 636 W HCPCS: Performed by: NURSE ANESTHETIST, CERTIFIED REGISTERED

## 2022-06-27 PROCEDURE — 63600175 PHARM REV CODE 636 W HCPCS: Performed by: ANESTHESIOLOGY

## 2022-06-27 PROCEDURE — 71000016 HC POSTOP RECOV ADDL HR: Performed by: SURGERY

## 2022-06-27 PROCEDURE — 63600175 PHARM REV CODE 636 W HCPCS

## 2022-06-27 PROCEDURE — 19120 PR EXCISE BREAST CYST: ICD-10-PCS | Mod: RT,,, | Performed by: SURGERY

## 2022-06-27 RX ORDER — SODIUM CHLORIDE 9 MG/ML
INJECTION, SOLUTION INTRAMUSCULAR; INTRAVENOUS; SUBCUTANEOUS
Status: DISCONTINUED | OUTPATIENT
Start: 2022-06-27 | End: 2022-06-27 | Stop reason: HOSPADM

## 2022-06-27 RX ORDER — ONDANSETRON 4 MG/1
8 TABLET, ORALLY DISINTEGRATING ORAL EVERY 6 HOURS PRN
Status: DISCONTINUED | OUTPATIENT
Start: 2022-06-27 | End: 2022-06-27 | Stop reason: HOSPADM

## 2022-06-27 RX ORDER — MIDAZOLAM HYDROCHLORIDE 1 MG/ML
2 INJECTION INTRAMUSCULAR; INTRAVENOUS ONCE AS NEEDED
Status: COMPLETED | OUTPATIENT
Start: 2022-06-27 | End: 2022-06-27

## 2022-06-27 RX ORDER — LIDOCAINE HYDROCHLORIDE 10 MG/ML
1 INJECTION, SOLUTION EPIDURAL; INFILTRATION; INTRACAUDAL; PERINEURAL ONCE
Status: COMPLETED | OUTPATIENT
Start: 2022-06-27 | End: 2022-06-27

## 2022-06-27 RX ORDER — CEFAZOLIN SODIUM 1 G/3ML
INJECTION, POWDER, FOR SOLUTION INTRAMUSCULAR; INTRAVENOUS
Status: DISCONTINUED | OUTPATIENT
Start: 2022-06-27 | End: 2022-06-27

## 2022-06-27 RX ORDER — MEPERIDINE HYDROCHLORIDE 25 MG/ML
12.5 INJECTION INTRAMUSCULAR; INTRAVENOUS; SUBCUTANEOUS EVERY 10 MIN PRN
Status: CANCELLED | OUTPATIENT
Start: 2022-06-27 | End: 2022-06-28

## 2022-06-27 RX ORDER — PROCHLORPERAZINE EDISYLATE 5 MG/ML
5 INJECTION INTRAMUSCULAR; INTRAVENOUS EVERY 30 MIN PRN
Status: CANCELLED | OUTPATIENT
Start: 2022-06-27

## 2022-06-27 RX ORDER — PROPOFOL 10 MG/ML
VIAL (ML) INTRAVENOUS
Status: DISCONTINUED | OUTPATIENT
Start: 2022-06-27 | End: 2022-06-27

## 2022-06-27 RX ORDER — DEXAMETHASONE SODIUM PHOSPHATE 4 MG/ML
INJECTION, SOLUTION INTRA-ARTICULAR; INTRALESIONAL; INTRAMUSCULAR; INTRAVENOUS; SOFT TISSUE
Status: DISCONTINUED | OUTPATIENT
Start: 2022-06-27 | End: 2022-06-27

## 2022-06-27 RX ORDER — SODIUM CHLORIDE, SODIUM LACTATE, POTASSIUM CHLORIDE, CALCIUM CHLORIDE 600; 310; 30; 20 MG/100ML; MG/100ML; MG/100ML; MG/100ML
INJECTION, SOLUTION INTRAVENOUS CONTINUOUS
Status: DISCONTINUED | OUTPATIENT
Start: 2022-06-27 | End: 2022-06-27 | Stop reason: HOSPADM

## 2022-06-27 RX ORDER — GABAPENTIN 300 MG/1
600 CAPSULE ORAL ONCE
Status: COMPLETED | OUTPATIENT
Start: 2022-06-27 | End: 2022-06-27

## 2022-06-27 RX ORDER — CEFAZOLIN SODIUM 1 G/3ML
INJECTION, POWDER, FOR SOLUTION INTRAMUSCULAR; INTRAVENOUS
Status: DISCONTINUED | OUTPATIENT
Start: 2022-06-27 | End: 2022-06-27 | Stop reason: HOSPADM

## 2022-06-27 RX ORDER — CEFAZOLIN SODIUM 1 G/3ML
INJECTION, POWDER, FOR SOLUTION INTRAMUSCULAR; INTRAVENOUS
Status: DISCONTINUED
Start: 2022-06-27 | End: 2022-06-27 | Stop reason: HOSPADM

## 2022-06-27 RX ORDER — ACETAMINOPHEN 10 MG/ML
1000 INJECTION, SOLUTION INTRAVENOUS ONCE
Status: COMPLETED | OUTPATIENT
Start: 2022-06-27 | End: 2022-06-27

## 2022-06-27 RX ORDER — CEFAZOLIN SODIUM 2 G/50ML
2 SOLUTION INTRAVENOUS
Status: DISCONTINUED | OUTPATIENT
Start: 2022-06-27 | End: 2022-06-27 | Stop reason: HOSPADM

## 2022-06-27 RX ORDER — SODIUM CHLORIDE, SODIUM GLUCONATE, SODIUM ACETATE, POTASSIUM CHLORIDE AND MAGNESIUM CHLORIDE 30; 37; 368; 526; 502 MG/100ML; MG/100ML; MG/100ML; MG/100ML; MG/100ML
1000 INJECTION, SOLUTION INTRAVENOUS CONTINUOUS
Status: DISCONTINUED | OUTPATIENT
Start: 2022-06-27 | End: 2022-06-27 | Stop reason: HOSPADM

## 2022-06-27 RX ORDER — ONDANSETRON HYDROCHLORIDE 2 MG/ML
INJECTION, SOLUTION INTRAMUSCULAR; INTRAVENOUS
Status: DISCONTINUED | OUTPATIENT
Start: 2022-06-27 | End: 2022-06-27

## 2022-06-27 RX ORDER — BUPIVACAINE HYDROCHLORIDE 2.5 MG/ML
INJECTION, SOLUTION EPIDURAL; INFILTRATION; INTRACAUDAL
Status: DISCONTINUED
Start: 2022-06-27 | End: 2022-06-27 | Stop reason: HOSPADM

## 2022-06-27 RX ORDER — ONDANSETRON 2 MG/ML
4 INJECTION INTRAMUSCULAR; INTRAVENOUS EVERY 12 HOURS PRN
Status: DISCONTINUED | OUTPATIENT
Start: 2022-06-27 | End: 2022-06-27 | Stop reason: HOSPADM

## 2022-06-27 RX ORDER — BUPIVACAINE HYDROCHLORIDE 2.5 MG/ML
INJECTION, SOLUTION EPIDURAL; INFILTRATION; INTRACAUDAL
Status: DISCONTINUED | OUTPATIENT
Start: 2022-06-27 | End: 2022-06-27 | Stop reason: HOSPADM

## 2022-06-27 RX ORDER — SODIUM CHLORIDE 9 MG/ML
INJECTION, SOLUTION INTRAMUSCULAR; INTRAVENOUS; SUBCUTANEOUS
Status: DISCONTINUED
Start: 2022-06-27 | End: 2022-06-27 | Stop reason: HOSPADM

## 2022-06-27 RX ORDER — MIDAZOLAM HYDROCHLORIDE 1 MG/ML
INJECTION INTRAMUSCULAR; INTRAVENOUS
Status: COMPLETED
Start: 2022-06-27 | End: 2022-06-27

## 2022-06-27 RX ORDER — IPRATROPIUM BROMIDE AND ALBUTEROL SULFATE 2.5; .5 MG/3ML; MG/3ML
3 SOLUTION RESPIRATORY (INHALATION)
Status: DISCONTINUED | OUTPATIENT
Start: 2022-06-27 | End: 2022-06-27 | Stop reason: HOSPADM

## 2022-06-27 RX ORDER — HYDROMORPHONE HYDROCHLORIDE 2 MG/ML
0.4 INJECTION, SOLUTION INTRAMUSCULAR; INTRAVENOUS; SUBCUTANEOUS EVERY 5 MIN PRN
Status: CANCELLED | OUTPATIENT
Start: 2022-06-27

## 2022-06-27 RX ORDER — FENTANYL CITRATE 50 UG/ML
INJECTION, SOLUTION INTRAMUSCULAR; INTRAVENOUS
Status: DISCONTINUED | OUTPATIENT
Start: 2022-06-27 | End: 2022-06-27

## 2022-06-27 RX ORDER — TRAMADOL HYDROCHLORIDE 50 MG/1
50 TABLET ORAL EVERY 4 HOURS PRN
Status: DISCONTINUED | OUTPATIENT
Start: 2022-06-27 | End: 2022-06-27 | Stop reason: HOSPADM

## 2022-06-27 RX ORDER — HYDROMORPHONE HYDROCHLORIDE 2 MG/ML
0.2 INJECTION, SOLUTION INTRAMUSCULAR; INTRAVENOUS; SUBCUTANEOUS EVERY 5 MIN PRN
Status: CANCELLED | OUTPATIENT
Start: 2022-06-27

## 2022-06-27 RX ORDER — ONDANSETRON 2 MG/ML
4 INJECTION INTRAMUSCULAR; INTRAVENOUS DAILY PRN
Status: CANCELLED | OUTPATIENT
Start: 2022-06-27

## 2022-06-27 RX ORDER — CEFAZOLIN SODIUM 1 G/3ML
INJECTION, POWDER, FOR SOLUTION INTRAMUSCULAR; INTRAVENOUS
Status: COMPLETED
Start: 2022-06-27 | End: 2022-06-27

## 2022-06-27 RX ADMIN — ONDANSETRON 4 MG: 2 INJECTION INTRAMUSCULAR; INTRAVENOUS at 10:06

## 2022-06-27 RX ADMIN — GABAPENTIN 600 MG: 300 CAPSULE ORAL at 08:06

## 2022-06-27 RX ADMIN — LIDOCAINE HYDROCHLORIDE 4 ML: 10 INJECTION, SOLUTION EPIDURAL; INFILTRATION; INTRACAUDAL; PERINEURAL at 09:06

## 2022-06-27 RX ADMIN — MIDAZOLAM HYDROCHLORIDE 2 MG: 1 INJECTION INTRAMUSCULAR; INTRAVENOUS at 09:06

## 2022-06-27 RX ADMIN — SODIUM CHLORIDE, POTASSIUM CHLORIDE, SODIUM LACTATE AND CALCIUM CHLORIDE: 600; 310; 30; 20 INJECTION, SOLUTION INTRAVENOUS at 09:06

## 2022-06-27 RX ADMIN — CEFAZOLIN 2 G: 330 INJECTION, POWDER, FOR SOLUTION INTRAMUSCULAR; INTRAVENOUS at 09:06

## 2022-06-27 RX ADMIN — PROPOFOL 150 MG: 10 INJECTION, EMULSION INTRAVENOUS at 09:06

## 2022-06-27 RX ADMIN — ACETAMINOPHEN 1000 MG: 10 INJECTION, SOLUTION INTRAVENOUS at 08:06

## 2022-06-27 RX ADMIN — FENTANYL CITRATE 50 MCG: 50 INJECTION, SOLUTION INTRAMUSCULAR; INTRAVENOUS at 10:06

## 2022-06-27 RX ADMIN — MIDAZOLAM 2 MG: 1 INJECTION INTRAMUSCULAR; INTRAVENOUS at 09:06

## 2022-06-27 RX ADMIN — DEXAMETHASONE SODIUM PHOSPHATE 4 MG: 4 INJECTION, SOLUTION INTRA-ARTICULAR; INTRALESIONAL; INTRAMUSCULAR; INTRAVENOUS; SOFT TISSUE at 10:06

## 2022-06-27 RX ADMIN — PROPOFOL 50 MG: 10 INJECTION, EMULSION INTRAVENOUS at 10:06

## 2022-06-27 NOTE — BRIEF OP NOTE
[facility]  Brief Operative Note     SUMMARY     Surgery Date: 6/27/2022     Surgeon: Sintia Red MD    Assist: Cyndi Wallace PA-C    Pre-op Diagnosis:  * No pre-op diagnosis entered *    Post-op Diagnosis:  Post-Op Diagnosis Codes:     * Bloody discharge from right nipple [N64.52]    Procedure(s) (LRB):  BIOPSY, BREAST, WITH LUMPECTOMY / Right Major duct excision (Right)    Anesthesia: General    Findings/Key Components: Right breast nipple discharge and removal of major breast ducts    Estimated Blood Loss: 7 ml         Specimens:   Specimen (24h ago, onward)             Start     Ordered    06/27/22 1052  Specimen to Pathology  RELEASE UPON ORDERING        Comments: Specimen A: RIGHT BREAST MAJOR DUCT EXCISION     References:    Click here for ordering Quick Tip   Question:  Release to patient  Answer:  Immediate    06/27/22 1052                Discharge Note    SUMMARY     Admit Date: 6/27/2022    Discharge Date and Time:  06/27/2022 11:29 AM    Hospital Course (synopsis of major diagnoses, care, treatment, and services provided during the course of the hospital stay): She is status post removal right breast major duct     Final Diagnosis: Post-Op Diagnosis Codes:     * Bloody discharge from right nipple [N64.52]    Disposition: Home or Self Care    Follow Up/Patient Instructions:    Follow-up Information     EDNA Baer Follow up in 10 day(s).    Specialty: Physician Assistant  Why: 7/7/2022 9:40 AM  Contact information:  [address] [place] [id number]  [phone]                         Medications:  Reconciled Home Medications:      Medication List      CONTINUE taking these medications    cholecalciferol (vitamin D3) 125 mcg (5,000 unit) capsule  Take 5,000 Units by mouth once daily.     docusate sodium 100 MG capsule  Commonly known as: COLACE  Take 100 mg by mouth 2 (two) times daily as needed.     DULoxetine 60 MG capsule  Commonly known as:  CYMBALTA  Take 1 capsule by mouth once daily.     gabapentin 600 MG tablet  Commonly known as: NEURONTIN  Take 1,200 mg by mouth nightly.     indomethacin 75 mg Cpsr CR capsule  Commonly known as: INDOCIN SR  Take 1 capsule by mouth 2 (two) times daily.     methocarbamoL 500 MG Tab  Commonly known as: ROBAXIN  Take 500 mg by mouth 3 (three) times daily.     mupirocin 2 % ointment  Commonly known as: BACTROBAN  1 application     omega 3-dha-epa-fish oil 1,000 mg (120 mg-180 mg) Cap  Take 2 capsules by mouth Daily.     omeprazole 20 MG capsule  Commonly known as: PRILOSEC  Take 20 mg by mouth once daily.     pilocarpine 5 MG Tab  Commonly known as: SALAGEN  Take 10 mg by mouth 3 (three) times daily.     polyethylene glycol 17 gram/dose powder  Commonly known as: GLYCOLAX  Take 17 g by mouth daily as needed.     PREMARIN vaginal cream  Generic drug: conjugated estrogens  Place 1 g vaginally twice a week.     PROBIOTIC ACIDOPHILUS BEADS 2 billion cell Cap  Generic drug: L.acidoph,plant-B.animal,long  Take 2 capsules by mouth Daily.     traMADoL 50 mg tablet  Commonly known as: ULTRAM  Take 50 mg by mouth every 12 (twelve) hours as needed for Pain.     triamterene-hydrochlorothiazide 37.5-25 mg 37.5-25 mg per tablet  Commonly known as: MAXZIDE-25  Take 1 tablet by mouth every morning.          Discharge Procedure Orders   Diet general     Ice to affected area     Lifting restrictions     Remove dressing in 24 hours   Order Comments: Leave glue and steri strips until clinic visit     Call MD for:  temperature >100.4     Call MD for:  persistent nausea and vomiting     Call MD for:  severe uncontrolled pain     Call MD for:  difficulty breathing, headache or visual disturbances     Call MD for:  redness, tenderness, or signs of infection (pain, swelling, redness, odor or green/yellow discharge around incision site)     Call MD for:  hives     Call MD for:  persistent dizziness or light-headedness      Follow-up Information      EDNA Baer Follow up in 10 day(s).    Specialty: Physician Assistant  Why: 7/7/2022 9:40 AM  Contact information:  [address] [place] [address]  [address]  [facility] [address]  [phone]

## 2022-06-27 NOTE — PATIENT INSTRUCTIONS
BREAST SURGERY POST-OP INSTRUCTIONS  DR. SID VALADEZ PA-C       How do I care for my incisions?  You and your caregiver should look at your incision daily. Call your doctor if you see any redness or drainage from your incision.  You will be given a support bra, wear this for 24 hours a day (unless showering or sponge bathing) for comfort and to help decrease amount of swelling. If the bra fits too loose or too tight you may go to your local store a purchase a front opening sports bra that fits snug.   Your incision will be closed with sutures (stitches) under your skin. These sutures dissolve on their own, so they do not need to be removed.  · If you go home with Steri-StripsTM on your incision, they will loosen and fall off by themselves. If they havent fallen off within 14 days, you may remove them.  · If you go home with glue over your sutures (stitches), it will also loosen and peel off, similarly to the Steri-Strips.  ** If you have had a Mastectomy and/or Reconstruction and/or Axillary Lymph Node Dissection:  You may have 1-2 Derek-Boone Drains in place. Please refer to the additional instructions discussing care of your drains.     Is it normal to feel new sensations?  As you are healing, you may feel a several different sensations in your breast. Tenderness, numbness, and twinges are common examples. These sensations usually come and go, and will lessen over time, usually within the first few months after surgery. As you continue to heal, you may feel scar tissue along your incision site. It will feel hard. This is common and will soften over the next several months.     Can I shower?  You can shower 24 hours after your surgery. Taking a warm shower is relaxing and can help decrease discomfort. Use soap when you shower and gently wash your incision. Pat the areas dry with a towel after showering, and leave your incision uncovered, unless you have drainage from your incision. If you  have drainage, call your doctors office.  Do not take tub baths, swim, or use hot tubs or saunas until you discuss it with your doctor at the first appointment after your surgery.    Will I have pain when I am home?  The length of time each person has pain or discomfort varies. You will be given a prescription for pain medication before you go home. Follow the guidelines below to manage your pain.  · Take your medication as directed and as needed.  · Call your doctor if the pain medication prescribed for you doesnt relieve your pain.  · Do not drive or drink alcohol while you are taking prescription pain medication.  · As your incision heals, you will have less pain and need less pain medication. A mild pain reliever such as acetaminophen (Tylenol) or ibuprofen (Advil) will relieve aches and discomfort. However, large quantities of acetaminophen may be harmful to your liver. Do not take more acetaminophen than the amount directed on the bottle or as instructed by your doctor or nurse.  · Pain medication should help you as you resume your normal activities. Pain medication is most effective 30 to 45 minutes after taking it.  · Keep track of when you take your pain medication. Taking it when your pain first begins is more effective than waiting for the pain to get worse.  Pain medication may cause constipation (having fewer bowel movements than what is normal for you).    How can I prevent constipation?  · Go to the bathroom at the same time every day. Your body will get used to going at that time.  · If you feel the urge to go, do not put it off. Try to use the bathroom 5 to 15 minutes after meals.  · After breakfast is a good time to move your bowels because the reflexes in your colon are strongest then.  · Exercise if you can; walking is an excellent form of exercise.  · Drink 8 (8-ounce) glasses (2 liters) of liquids daily, if you can. Drink water, juices, soups, ice cream shakes, and other drinks that do not  have caffeine. Beverages with caffeine, such as coffee and soda, pull fluid out of the body.  · Slowly increase the fiber in your diet to 25 to 35 grams per day. Fruits, vegetables, whole grains, and cereals contain fiber. If you have an ostomy or have had recent bowel surgery, check with your doctor or nurse before making any changes in your diet.  · Both over-the-counter and prescription medications are available to treat constipation. Start with 1 of the following over-the-counter medications first:  o Docusate sodium (Colace®) 100 mg. Take __1___ capsules __1___ time a day. This is a stool softener that causes few side effects. Do not take it with mineral oil.  o Polyethylene glycol (MiraLAX®) 17 grams daily.  o Senna (Senokot®) 2 tablets at bedtime. This is a stimulant laxative, which can cause cramping.  · If you havent had a bowel movement in 2 days, call your doctor or nurse.    Will I be able to eat?  You can resume eating when you go home after surgery. Eating a balanced diet high in protein will help you heal after surgery. Your diet should include a healthy protein source at each meal, as well as fruits, vegetables, and whole grains. If you have questions about your diet, ask to see a dietitian.    When is it safe for me to drive and what activities can I perform?  You may resume driving after surgery as long as you are not taking prescription pain medication that may make you drowsy, and you have your full range of motion.  You should not lift anything heavier than 10-15 lbs the first week. After this time, you may gradually increase the amount of weight. You want to take it easy the first 2 weeks, no strenuous or repetitive movements such as vacuuming or scrubbing. Walking is okay. Ask the doctor if you have questions.    How long until I have the pathology results?  The pathology report usually takes to 7 to 10 business days.    When is my first appointment after my surgery?  You should be given or  schedule a follow-up appointment 1 to 2 weeks after your surgery.    How can I cope with my feelings?   After surgery for a serious illness, you may have new and upsetting feelings. Many patients say they felt sad, worried, nervous, irritable, or angry at one time or another. You may find that you cannot control some of these feelings. If this happens, its a good idea to seek emotional support.  The first step in coping is to talk about how you feel. Family and friends can help. Your nurse, doctor, and  can reassure, support, and guide you. It is always a good idea to let these professionals know how you, your family, and your friends are feeling emotionally. Many resources are available to patients and their families. Whether you are in the hospital or at home, we are here to help you and your family and friends handle the emotional aspects of your illness.    What if I have other questions?  If you have any questions or concerns, please talk with your doctor or nurse. You can reach them Monday through Thursday from 9:00 AM to 5:00 PM and Friday from 9:00 AM to 12:00 PM at [phone].  After 5:00 PM M-Th or 12:00 PM Fri, during the weekend, and on holidays, please call [phone] and ask for the doctor on call.    PLEASE CALL YOUR DOCTOR OR NURSE IF YOU HAVE:  · A temperature of 101° F (38.3° C) or higher  · Shortness of breath  · Warmer than normal skin around your incision  · Increased discomfort in the area  · Increased redness around your incision  · New or increased swelling around your incision  · Discharge from your incision

## 2022-06-27 NOTE — ANESTHESIA PROCEDURE NOTES
Intubation    Date/Time: 6/27/2022 10:00 AM  Performed by: Kalani Cueva CRNA  Authorized by: Frandy Seals Jr., MD     Intubation:     Induction:  Intravenous    Intubated:  Postinduction    Mask Ventilation:  Easy mask    Attempts:  1    Attempted By:  CRNA    Difficult Airway Encountered?: No      Complications:  None    Airway Device:  Supraglottic airway/LMA    Airway Device Size:  4.0    Style/Cuff Inflation:  Cuffed    Inflation Amount (mL):  30    Secured at:  The lips    Placement Verified By:  Capnometry    Complicating Factors:  None    Findings Post-Intubation:  BS equal bilateral

## 2022-06-27 NOTE — ANESTHESIA POSTPROCEDURE EVALUATION
Anesthesia Post Evaluation    Patient: Marina Dixon    Procedure(s) Performed: Procedure(s) (LRB):  BIOPSY, BREAST, WITH LUMPECTOMY / Right Major duct excision (Right)    Final Anesthesia Type: general      Patient location during evaluation: floor  Patient participation: Yes- Able to Participate  Level of consciousness: awake and alert  Post-procedure vital signs: reviewed and stable  Pain management: adequate  Airway patency: patent    PONV status at discharge: No PONV  Anesthetic complications: no      Cardiovascular status: blood pressure returned to baseline  Respiratory status: spontaneous ventilation and room air  Hydration status: euvolemic  Follow-up not needed.          Vitals Value Taken Time   /80 06/27/22 1301   Temp 36.6 °C (97.9 °F) 06/27/22 1222   Pulse 72 06/27/22 1301   Resp 16 06/27/22 1222   SpO2 93 % 06/27/22 1301         Event Time   Out of Recovery 12:09:00         Pain/Rene Score: Pain Rating Prior to Med Admin: 5 (6/27/2022  8:43 AM)  Rene Score: 10 (6/27/2022 12:20 PM)  Modified Rene Score: 20 (6/27/2022  1:19 PM)

## 2022-06-27 NOTE — TRANSFER OF CARE
"Anesthesia Transfer of Care Note    Patient: Marina Dixon    Procedure(s) Performed: Procedure(s) (LRB):  BIOPSY, BREAST, WITH LUMPECTOMY / Right Major duct excision (Right)    Patient location: PACU    Anesthesia Type: general    Transport from OR: Transported from OR on room air with adequate spontaneous ventilation    Post pain: adequate analgesia    Post assessment: no apparent anesthetic complications    Post vital signs: stable    Level of consciousness: responds to stimulation    Nausea/Vomiting: no nausea/vomiting    Complications: none    Transfer of care protocol was followed      Last vitals:   Visit Vitals  BP (!) 143/84   Pulse 63   Temp 36.9 °C (98.4 °F) (Oral)   Resp 20   Ht 5' 6" (1.676 m)   Wt 111.1 kg (244 lb 14.9 oz)   SpO2 96%   Breastfeeding No   BMI 39.53 kg/m²     "

## 2022-06-27 NOTE — INTERVAL H&P NOTE
The patient has been examined and the H&P has been reviewed:    I concur with the findings and no changes have occurred since H&P was written.    Surgery risks, benefits and alternative options discussed and understood by patient/family.          Active Hospital Problems    Diagnosis  POA    *Bloody discharge from right nipple [N64.52]  Yes      Resolved Hospital Problems   No resolved problems to display.     PLAN:  Major duct excision of the right breast

## 2022-06-27 NOTE — ANESTHESIA PREPROCEDURE EVALUATION
06/27/2022  Marina Dixon is a 62 y.o., female presents are right breast lumpectomy due bloody breast discharge.      Last 3 sets of Vitals    Vitals - 1 value per visit 6/14/2022 6/23/2022 6/27/2022   SYSTOLIC 134 - 143   DIASTOLIC 76 - 84   Pulse 78 - 63   Temp 98.6 - 98.4   Resp 20 - -   SPO2 96 - 96   Weight (lb) 250.4 245 -   Weight (kg) 113.581 111.131 -   Height 66 66 -   BMI (Calculated) 40.4 39.6 -   VISIT REPORT - - -   Pain Score  - - -         Lab Results   Component Value Date    WBC 4.6 06/23/2022    HGB 13.6 06/23/2022    HCT 42.5 06/23/2022    MCV 91.2 06/23/2022     06/23/2022          BMP  Lab Results   Component Value Date     06/23/2022    K 3.2 (L) 06/23/2022    CO2 29 06/23/2022    BUN 16.5 06/23/2022    CREATININE 1.14 (H) 06/23/2022    CALCIUM 9.4 06/23/2022    EGFRNONAA 51 06/23/2022        CMP  Sodium Level   Date Value Ref Range Status   06/23/2022 141 136 - 145 mmol/L Final     Potassium Level   Date Value Ref Range Status   06/23/2022 3.2 (L) 3.5 - 5.1 mmol/L Final     Carbon Dioxide   Date Value Ref Range Status   06/23/2022 29 23 - 31 mmol/L Final     Blood Urea Nitrogen   Date Value Ref Range Status   06/23/2022 16.5 9.8 - 20.1 mg/dL Final     Creatinine   Date Value Ref Range Status   06/23/2022 1.14 (H) 0.55 - 1.02 mg/dL Final     Calcium Level Total   Date Value Ref Range Status   06/23/2022 9.4 8.4 - 10.2 mg/dL Final     Estimated GFR-Non    Date Value Ref Range Status   06/23/2022 51 mls/min/1.73/m2 Final        Pre-op Assessment    I have reviewed the Patient Summary Reports.     I have reviewed the Nursing Notes. I have reviewed the NPO Status.   I have reviewed the Medications.     Review of Systems  Anesthesia Hx:  Personal Hx of Anesthesia complications Denies Post-Operative Nausea/Vomiting.  Denies Difficult Intubation.  Denies Dental  Injury.   Social:  Non-Smoker    Cardiovascular:   Hypertension  Functional Capacity good / => 4 METS    Pulmonary:   Sleep Apnea, CPAP    Hepatic/GI:   GERD, well controlled        Physical Exam  General: Well nourished, Cooperative, Alert and Oriented    Airway:  Mallampati: II   Mouth Opening: Normal  TM Distance: Normal  Tongue: Normal  Neck ROM: Normal ROM    Dental:  Intact    Chest/Lungs:  Clear to auscultation, Normal Respiratory Rate    Heart:  Rate: Normal  Rhythm: Regular Rhythm        Anesthesia Plan  Type of Anesthesia, risks & benefits discussed:    Anesthesia Type: Gen Supraglottic Airway  Intra-op Monitoring Plan: Standard ASA Monitors  Post Op Pain Control Plan: multimodal analgesia and IV/PO Opioids PRN  Induction:  IV  Airway Plan: Direct  Informed Consent: Informed consent signed with the Patient and all parties understand the risks and agree with anesthesia plan.  All questions answered.   ASA Score: 2  Day of Surgery Review of History & Physical: H&P Update referred to the surgeon/provider.    Ready For Surgery From Anesthesia Perspective.     .

## 2022-06-27 NOTE — PLAN OF CARE
Problem: Fall Injury Risk  Goal: Absence of Fall and Fall-Related Injury  Outcome: Ongoing, Progressing  Intervention: Identify and Manage Contributors  Flowsheets (Taken 6/27/2022 1220)  Self-Care Promotion: independence encouraged  Medication Review/Management: medications reviewed  Intervention: Promote Injury-Free Environment  Flowsheets (Taken 6/27/2022 1220)  Safety Promotion/Fall Prevention:   instructed to call staff for mobility   Supervised toileting - stay within arms reach   supervised activity   side rails raised x 2   family to remain at bedside   Fall Risk reviewed with patient/family     Problem: Pain Acute  Goal: Acceptable Pain Control and Functional Ability  Outcome: Met  Intervention: Develop Pain Management Plan  Flowsheets (Taken 6/27/2022 1220)  Pain Management Interventions:   pillow support provided   position adjusted   care clustered   quiet environment facilitated  Intervention: Prevent or Manage Pain  Flowsheets (Taken 6/27/2022 1220)  Sleep/Rest Enhancement:   family presence promoted   awakenings minimized   noise level reduced   room darkened  Sensory Stimulation Regulation:   auditory stimulation minimized   quiet environment promoted   visual stimulation minimized   care clustered   tactile stimulation minimized  Bowel Elimination Promotion:   adequate fluid intake promoted   ambulation promoted  Medication Review/Management: medications reviewed

## 2022-06-27 NOTE — OP NOTE
DATE OF PROCEDURE: 6/27/2022    SURGEON: Sintia Red M.D.    ASSISTANT:  Cyndi Wallace PA-C    PREOPERATIVE DIAGNOSIS: Bloody discharge from right nipple [N64.52]    POSTOPERATIVE DIAGNOSIS: Post-Op Diagnosis Codes:     * Bloody discharge from right nipple [N64.52]     ANESTHESIA: General Anesthesiologist: Frandy Seals Jr., MD  CRNA: Kalani Cueva CRNA     PROCEDURES PERFORMED:   1. Right breast major duct excision     BIOPSY, BREAST, WITH LUMPECTOMY / Right Major duct excision:        PROCEDURE IN DETAIL:   Marina Dixon is a 62 y.o. female brought to the operating room for definitive surgical management of recurrent right nipple discharge which has now turned bloody. The patient has elected to undergo major duct excisional biopsy for further evaluation. The patient was informed of the possible risks and complications of the procedure, including but not limited to anesthetic risks, bleeding, infection, and need for additional surgery. The patient concurred with the proposed plan, and has given informed consent. The site of surgery was properly noted/marked in the preoperative holding area.    She was then brought to the Operating Room and placed in the supine position. Anesthesia was administered. The right breast, anterior chest, arm and axilla were then prepped and draped in a sterile fashion.     Attention was turned to the right breast itself. Yellow colored nipple discharge was expressed from a single duct located at 4-5 o'clock. Using a small lacrimal probe the duct was localized along with its tract. An incision was made in the 3-6 o'clock circumareolar area of the right breast over the anticipated tract of the probe. The specimen was dissected circumferentially around the probe and area of concern. The dissection was carried out circumferentially to include the probe. The specimen was completely dissected free. The lacrimal probe localized specimen was marked with sterile margin charms and  submitted for pathology review.     Within the lumpectomy cavity, hemostasis was achieved with cautery. The wound was irrigated until clear. There was no evidence of bleeding. It was closed in multiple layers with deep dermal and subcutaneous interrupted 3-0 Monocryl sutures and a running 4-0 Monocryl subcuticular skin closure. Dermabond was applied followed by sterile dressings.    All instruments and sponge counts were correct at the end of the procedure.     Significant Surgical Tasks Conducted by the Assistant(s), if Applicable: The skilled assistance of the Physician Assistant, Cyndi Wallace PA-C, was necessary for the successful completion of this case. She was essential for proper positioning of the patient, manipulation of instruments, proper exposure, manipulation of tissue, and wound closure.       ESTIMATED BLOOD LOSS: 7 ml    Implants: * No implants in log *    Specimens:   Specimen (24h ago, onward)             Start     Ordered    06/27/22 1052  Specimen to Pathology  RELEASE UPON ORDERING        Comments: Specimen A: RIGHT BREAST MAJOR DUCT EXCISION     References:    Click here for ordering Quick Tip   Question:  Release to patient  Answer:  Immediate    06/27/22 1052                        Condition: Good    Disposition: PACU - hemodynamically stable.    Attestation: I performed the procedure.

## 2022-06-28 VITALS
TEMPERATURE: 98 F | BODY MASS INDEX: 39.36 KG/M2 | SYSTOLIC BLOOD PRESSURE: 129 MMHG | HEART RATE: 72 BPM | DIASTOLIC BLOOD PRESSURE: 80 MMHG | HEIGHT: 66 IN | RESPIRATION RATE: 16 BRPM | WEIGHT: 244.94 LBS | OXYGEN SATURATION: 93 %

## 2022-06-30 LAB
DHEA SERPL-MCNC: NORMAL
ESTROGEN SERPL-MCNC: NORMAL PG/ML
INSULIN SERPL-ACNC: NORMAL U[IU]/ML
LAB AP CLINICAL INFORMATION: NORMAL
LAB AP GROSS DESCRIPTION: NORMAL
LAB AP REPORT FOOTNOTES: NORMAL
T3RU NFR SERPL: NORMAL %

## 2022-07-01 ENCOUNTER — TELEPHONE (OUTPATIENT)
Dept: SURGERY | Facility: CLINIC | Age: 62
End: 2022-07-01
Payer: COMMERCIAL

## 2022-07-01 NOTE — TELEPHONE ENCOUNTER
Patient called with her pathology results.      ----- Message from Sintia Red MD sent at 7/1/2022  7:07 AM CDT -----  Please call the patient and inform pathology results revealed benign findings or no evidence of cancer. Further discussion will be had at their post-op/follow-up appointment.

## 2022-07-07 ENCOUNTER — OFFICE VISIT (OUTPATIENT)
Dept: SURGERY | Facility: CLINIC | Age: 62
End: 2022-07-07
Payer: COMMERCIAL

## 2022-07-07 VITALS
RESPIRATION RATE: 18 BRPM | HEIGHT: 66 IN | DIASTOLIC BLOOD PRESSURE: 90 MMHG | SYSTOLIC BLOOD PRESSURE: 145 MMHG | TEMPERATURE: 98 F | HEART RATE: 68 BPM | OXYGEN SATURATION: 97 % | WEIGHT: 250.19 LBS | BODY MASS INDEX: 40.21 KG/M2

## 2022-07-07 DIAGNOSIS — Z12.31 SCREENING MAMMOGRAM, ENCOUNTER FOR: ICD-10-CM

## 2022-07-07 DIAGNOSIS — Z87.898 HISTORY OF NIPPLE DISCHARGE: Primary | ICD-10-CM

## 2022-07-07 PROCEDURE — 3080F DIAST BP >= 90 MM HG: CPT | Mod: CPTII,S$GLB,, | Performed by: PHYSICIAN ASSISTANT

## 2022-07-07 PROCEDURE — 3077F PR MOST RECENT SYSTOLIC BLOOD PRESSURE >= 140 MM HG: ICD-10-PCS | Mod: CPTII,S$GLB,, | Performed by: PHYSICIAN ASSISTANT

## 2022-07-07 PROCEDURE — 99024 POSTOP FOLLOW-UP VISIT: CPT | Mod: S$GLB,,, | Performed by: PHYSICIAN ASSISTANT

## 2022-07-07 PROCEDURE — 99999 PR PBB SHADOW E&M-EST. PATIENT-LVL V: CPT | Mod: PBBFAC,,, | Performed by: PHYSICIAN ASSISTANT

## 2022-07-07 PROCEDURE — 1159F MED LIST DOCD IN RCRD: CPT | Mod: CPTII,S$GLB,, | Performed by: PHYSICIAN ASSISTANT

## 2022-07-07 PROCEDURE — 3080F PR MOST RECENT DIASTOLIC BLOOD PRESSURE >= 90 MM HG: ICD-10-PCS | Mod: CPTII,S$GLB,, | Performed by: PHYSICIAN ASSISTANT

## 2022-07-07 PROCEDURE — 99999 PR PBB SHADOW E&M-EST. PATIENT-LVL V: ICD-10-PCS | Mod: PBBFAC,,, | Performed by: PHYSICIAN ASSISTANT

## 2022-07-07 PROCEDURE — 99024 PR POST-OP FOLLOW-UP VISIT: ICD-10-PCS | Mod: S$GLB,,, | Performed by: PHYSICIAN ASSISTANT

## 2022-07-07 PROCEDURE — 1159F PR MEDICATION LIST DOCUMENTED IN MEDICAL RECORD: ICD-10-PCS | Mod: CPTII,S$GLB,, | Performed by: PHYSICIAN ASSISTANT

## 2022-07-07 PROCEDURE — 3008F PR BODY MASS INDEX (BMI) DOCUMENTED: ICD-10-PCS | Mod: CPTII,S$GLB,, | Performed by: PHYSICIAN ASSISTANT

## 2022-07-07 PROCEDURE — 3008F BODY MASS INDEX DOCD: CPT | Mod: CPTII,S$GLB,, | Performed by: PHYSICIAN ASSISTANT

## 2022-07-07 PROCEDURE — 3077F SYST BP >= 140 MM HG: CPT | Mod: CPTII,S$GLB,, | Performed by: PHYSICIAN ASSISTANT

## 2022-07-07 NOTE — PROGRESS NOTES
"[facility]  Breast Surgical Oncology  Post-Op Patient Office Visit       Referring Provider: No ref. provider found  PCP: Kalli Blanchard MD   Care Team:     Chief Complaint:   Chief Complaint   Patient presents with    Post-op Evaluation     Post-op evaluation, no complaints        Subjective:   Treatment History:  2022 Right breast Major duct excision for right nipple discharge    Interval History:  2022 - Marina Dixon is here today for her post op visit. She is s/p Right breast Major duct excision for right nipple discharge on 2022. Surgical pathology is benign. Given pathology results, her Tyrer-Cuzick Lifetime Risk for breast cancer was calculated and found to be 13.5%.      HPI:  Marina Dixon initially presented on 2020 with concern for right nipple discharge. She states she first noticed clear right nipple discharge about 1-2 months prior. It is not spontaneous and requires manipulation. She expressed the discharge from the nipple and it became bloody (or what she thought was blood). She states she was seen and evaluated by Dr. Santos (at this time not able to express any discharge) who recommended an ultrasound. She has DG MG and US of the right breast at [facility] on 20 and they were both negative for any findings. She had not had discharge since the first occurrence until a few days after her mammogram at [facility]. She has discharge after she "squeezed the nipple".     2022 - Marina Dixon presents today for follow up for right nipple discharge. She still has some drainage from the right nipple. It usually is clear/yellow, but states the last time she checked it was bloody.     Imagin. 2020 BL SC MF at [facility] - which revealed scattered fibroglandular densities, benign calcifications and no suspicious masses, calcifications, or other findings. BIRADS-2: benign.  2. 2020 R DG MG/ US at [facility] - which revealed heterogenously dense breast tissue. No suspicious masses, calcifications, or densities are seen including the subareolar region. Right US did not reveal any suspicious cystic or solid masses or ductal structures seen in the subareolar right breast. Lymph nodes are normal in the right axilla. No evidence of breast malignancy. No interval changes in appearance. BIRADS 2- Benign  3. 1/5/2021 BL DG MG / R US at [facility] - which revealed no concerning masses, calcifications, or distortions in either breast. R US revealed no evidence of ductal dilatation, ectasia, cyst, mass, or suspicious findings. BI-RADS 1: negative  4. 1/6/2022 BL DG MG/R US BREAST LIMITED at [facility] - which revealed on MG there is no correlate for the right nipple discharge. No significant masses, calcifications, or other findings are seen in either breast. There has been no significant interval change. On R US there is a 1.1 x 0.2 x 0.2 cm solid vascular hypoechoic intraductal mass in the subareolar position. The proximal section of the duct in which the solid mass resides is dilated with anechoic fluid leading up to the nipple. This correlates with the patient's right nipple discharge and is suspicious. No suspicious right axillary adenopathy. BIRADS-4 suspicious; biopsy recommended.        Pathology:  1. 1/31/2022 Ultrasound-guided Core Needle Biopsy of Right Breast Subareolar Intraductal Mass Posterior to Nipple - Non-proliferative fibrocystic changes, mild, non-specific. No evidence of atypia or malignancy     2. 6/27/2022 Right breast Major duct excision - extensive breast stromal fibrosis with multifocal mild-to-moderate duct ectasia and focal mild intraductal hyperplasia of usual type.  No evidence of significant inflammation, ductal rupture, dysplasia or malignancy identified.    OB/GYN History:  Menarche Onset: 10  Menopause: Post, at age: 42  Hormonal birth control (duration): 18 years  Pregnancies: 2  Age at first  child birth: 23  Child births: 2  Hysterectomy: yes, at age 42  Oophorectomy: yes, at age 42  HRT: HRT starting at 42 and stopping at 60 (estrogen alone)        Other Relevant History:  # of breast biopsies (when and pathology results): none  MG breast density: Category B (Scattered fibroglandular)  Prior thoracic RT: none  Genetic testing: none  Ashkenazi Jainism descent: No        Family History:  - Father: prostate cancer (64)  - Brother: Prostate (69)  - Brother: Kidney cancer (45)  Family History   Problem Relation Age of Onset    Diabetes Mother     Heart failure Mother     Hypertension Mother     Cancer Father     Rheum arthritis Sister     Cancer Brother         Patient History:  Past Medical History:   Diagnosis Date    Arthritis     Endometriosis of uterus 2002    GERD (gastroesophageal reflux disease)     Hypertension 2020    Menopause 2002    Hysterectomy    NINA (obstructive sleep apnea)     wears cpap    Sciatica        Past Surgical History:   Procedure Laterality Date    BREAST BIOPSY Right 6/27/2022    Procedure: BIOPSY, BREAST, WITH LUMPECTOMY / Right Major duct excision;  Surgeon: Sintia Red MD;  Location: [facility] [place];  Service: General;  Laterality: Right;    CATARACT EXTRACTION W/ INTRAOCULAR LENS  IMPLANT, BILATERAL      HYSTERECTOMY  2002    SKIN BIOPSY Right     breast       Social History     Socioeconomic History    Marital status:    Tobacco Use    Smoking status: Never Smoker    Smokeless tobacco: Never Used   Substance and Sexual Activity    Alcohol use: Never    Drug use: Never    Sexual activity: Yes     Partners: Male     Birth control/protection: None         There is no immunization history on file for this patient.    Medications/Allergies:  Current Outpatient Medications on File Prior to Visit   Medication Sig Dispense Refill    cholecalciferol, vitamin D3, 125 mcg (5,000 unit) capsule Take 5,000 Units by mouth once daily.      conjugated estrogens  (PREMARIN) vaginal cream Place 1 g vaginally twice a week.      docusate sodium (COLACE) 100 MG capsule Take 100 mg by mouth 2 (two) times daily as needed.      DULoxetine (CYMBALTA) 60 MG capsule Take 1 capsule by mouth once daily.      gabapentin (NEURONTIN) 600 MG tablet Take 1,200 mg by mouth nightly.      indomethacin (INDOCIN SR) 75 mg CpSR CR capsule Take 1 capsule by mouth 2 (two) times daily.      L.acidoph,plant-B.animal,long (PROBIOTIC ACIDOPHILUS BEADS) 2 billion cell Cap Take 2 capsules by mouth Daily.      methocarbamoL (ROBAXIN) 500 MG Tab Take 500 mg by mouth 3 (three) times daily.      mupirocin (BACTROBAN) 2 % ointment 1 application      omega 3-dha-epa-fish oil 1,000 mg (120 mg-180 mg) Cap Take 2 capsules by mouth Daily.      omeprazole (PRILOSEC) 20 MG capsule Take 20 mg by mouth once daily.      pilocarpine (SALAGEN) 5 MG Tab Take 10 mg by mouth 3 (three) times daily.      polyethylene glycol (GLYCOLAX) 17 gram/dose powder Take 17 g by mouth daily as needed.      traMADoL (ULTRAM) 50 mg tablet Take 50 mg by mouth every 12 (twelve) hours as needed for Pain.      triamterene-hydrochlorothiazide 37.5-25 mg (MAXZIDE-25) 37.5-25 mg per tablet Take 1 tablet by mouth every morning.       No current facility-administered medications on file prior to visit.       Review of patient's allergies indicates:   Allergen Reactions    Sulfa (sulfonamide antibiotics) Itching and Rash        Objective:     Vitals:  Vitals:    07/07/22 0928   BP: (!) 145/90   Pulse: 68   Resp: 18   Temp: 98.2 °F (36.8 °C)     Body mass index is 40.38 kg/m².     Physical Exam:  General: The patient is awake, alert and oriented times three. The patient is well nourished and in no acute distress.    Musculoskeletal: The patient has a normal range of motion of her bilateral upper extremities.    Breast: The incision is healing well. No tenderness, swelling, or redness.   Integumentary: no rashes or skin lesions  present  Neurologic: cranial nerves intact, no signs of peripheral neurological deficit, motor/sensory function intact      Assessment and Plan:          Marina was seen today for post-op evaluation.    Diagnoses and all orders for this visit:    History of nipple discharge    Screening mammogram, encounter for         Patient is doing well post op. Pathology was discussed in detail.     Plan:       Follow up MG recommended with BL SCR MG in January 2023, already ordered.    RTC after MG for follow up.    She was advised to call if she develops any questions or breast concerns (such as nipple discharge recurrence).        All of her questions were answered.     Cyndi Wallace PA-C

## 2022-07-15 DIAGNOSIS — M51.16 NEURITIS OR RADICULITIS DUE TO RUPTURE OF LUMBAR INTERVERTEBRAL DISC: Primary | ICD-10-CM

## 2022-07-21 ENCOUNTER — HOSPITAL ENCOUNTER (OUTPATIENT)
Dept: RADIOLOGY | Facility: HOSPITAL | Age: 62
Discharge: HOME | End: 2022-07-21
Attending: NEUROLOGICAL SURGERY
Payer: COMMERCIAL

## 2022-07-21 DIAGNOSIS — M51.16 NEURITIS OR RADICULITIS DUE TO RUPTURE OF LUMBAR INTERVERTEBRAL DISC: ICD-10-CM

## 2022-07-21 PROCEDURE — 25500020 PHARM REV CODE 255: Performed by: NEUROLOGICAL SURGERY

## 2022-07-21 PROCEDURE — 72158 MRI LUMBAR SPINE W/O & W/DYE: CPT | Mod: TC

## 2022-07-21 PROCEDURE — A9577 INJ MULTIHANCE: HCPCS | Performed by: NEUROLOGICAL SURGERY

## 2022-07-21 RX ADMIN — GADOBENATE DIMEGLUMINE 10 ML: 529 INJECTION, SOLUTION INTRAVENOUS at 04:07

## 2022-08-17 ENCOUNTER — HOSPITAL ENCOUNTER (INPATIENT)
Facility: HOSPITAL | Age: 62
LOS: 5 days | Discharge: NURSING, REHAB OR LONG-TERM CARE | DRG: 940 | End: 2022-08-22
Attending: EMERGENCY MEDICINE | Admitting: NEUROLOGICAL SURGERY
Payer: COMMERCIAL

## 2022-08-17 DIAGNOSIS — G89.18 ACUTE POST-OPERATIVE PAIN: ICD-10-CM

## 2022-08-17 DIAGNOSIS — M54.42 ACUTE BILATERAL LOW BACK PAIN WITH BILATERAL SCIATICA: Primary | ICD-10-CM

## 2022-08-17 DIAGNOSIS — M54.41 ACUTE BILATERAL LOW BACK PAIN WITH BILATERAL SCIATICA: Primary | ICD-10-CM

## 2022-08-17 DIAGNOSIS — M54.16 LUMBAR RADICULOPATHY: ICD-10-CM

## 2022-08-17 LAB
ALBUMIN SERPL-MCNC: 3.9 GM/DL (ref 3.4–4.8)
ALBUMIN/GLOB SERPL: 1.1 RATIO (ref 1.1–2)
ALP SERPL-CCNC: 95 UNIT/L (ref 40–150)
ALT SERPL-CCNC: 29 UNIT/L (ref 0–55)
AST SERPL-CCNC: 25 UNIT/L (ref 5–34)
BASOPHILS # BLD AUTO: 0.02 X10(3)/MCL (ref 0–0.2)
BASOPHILS NFR BLD AUTO: 0.1 %
BILIRUBIN DIRECT+TOT PNL SERPL-MCNC: 0.6 MG/DL
BUN SERPL-MCNC: 19.3 MG/DL (ref 9.8–20.1)
CALCIUM SERPL-MCNC: 9.6 MG/DL (ref 8.4–10.2)
CHLORIDE SERPL-SCNC: 100 MMOL/L (ref 98–107)
CO2 SERPL-SCNC: 28 MMOL/L (ref 23–31)
CREAT SERPL-MCNC: 0.9 MG/DL (ref 0.55–1.02)
CRP SERPL-MCNC: 142.9 MG/L
EOSINOPHIL # BLD AUTO: 0.01 X10(3)/MCL (ref 0–0.9)
EOSINOPHIL NFR BLD AUTO: 0.1 %
ERYTHROCYTE [DISTWIDTH] IN BLOOD BY AUTOMATED COUNT: 13.4 % (ref 11.5–17)
ERYTHROCYTE [SEDIMENTATION RATE] IN BLOOD: 53 MM/HR (ref 0–20)
GFR SERPLBLD CREATININE-BSD FMLA CKD-EPI: >60 MLS/MIN/1.73/M2
GLOBULIN SER-MCNC: 3.5 GM/DL (ref 2.4–3.5)
GLUCOSE SERPL-MCNC: 114 MG/DL (ref 82–115)
HCT VFR BLD AUTO: 39.8 % (ref 37–47)
HGB BLD-MCNC: 13 GM/DL (ref 12–16)
IMM GRANULOCYTES # BLD AUTO: 0.12 X10(3)/MCL (ref 0–0.04)
IMM GRANULOCYTES NFR BLD AUTO: 0.7 %
LYMPHOCYTES # BLD AUTO: 2.51 X10(3)/MCL (ref 0.6–4.6)
LYMPHOCYTES NFR BLD AUTO: 15.4 %
MCH RBC QN AUTO: 29.3 PG (ref 27–31)
MCHC RBC AUTO-ENTMCNC: 32.7 MG/DL (ref 33–36)
MCV RBC AUTO: 89.8 FL (ref 80–94)
MONOCYTES # BLD AUTO: 1.48 X10(3)/MCL (ref 0.1–1.3)
MONOCYTES NFR BLD AUTO: 9.1 %
NEUTROPHILS # BLD AUTO: 12.2 X10(3)/MCL (ref 2.1–9.2)
NEUTROPHILS NFR BLD AUTO: 74.6 %
NRBC BLD AUTO-RTO: 0 %
PLATELET # BLD AUTO: 269 X10(3)/MCL (ref 130–400)
PMV BLD AUTO: 9.9 FL (ref 7.4–10.4)
POTASSIUM SERPL-SCNC: 4.1 MMOL/L (ref 3.5–5.1)
PROT SERPL-MCNC: 7.4 GM/DL (ref 5.8–7.6)
RBC # BLD AUTO: 4.43 X10(6)/MCL (ref 4.2–5.4)
SODIUM SERPL-SCNC: 137 MMOL/L (ref 136–145)
WBC # SPEC AUTO: 16.3 X10(3)/MCL (ref 4.5–11.5)

## 2022-08-17 PROCEDURE — 86850 RBC ANTIBODY SCREEN: CPT | Performed by: EMERGENCY MEDICINE

## 2022-08-17 PROCEDURE — 86140 C-REACTIVE PROTEIN: CPT | Performed by: EMERGENCY MEDICINE

## 2022-08-17 PROCEDURE — 84075 ASSAY ALKALINE PHOSPHATASE: CPT | Performed by: EMERGENCY MEDICINE

## 2022-08-17 PROCEDURE — 85025 COMPLETE CBC W/AUTO DIFF WBC: CPT | Performed by: EMERGENCY MEDICINE

## 2022-08-17 PROCEDURE — 63600175 PHARM REV CODE 636 W HCPCS: Performed by: EMERGENCY MEDICINE

## 2022-08-17 PROCEDURE — 11000001 HC ACUTE MED/SURG PRIVATE ROOM

## 2022-08-17 PROCEDURE — 36415 COLL VENOUS BLD VENIPUNCTURE: CPT | Performed by: EMERGENCY MEDICINE

## 2022-08-17 PROCEDURE — 96376 TX/PRO/DX INJ SAME DRUG ADON: CPT

## 2022-08-17 PROCEDURE — 85651 RBC SED RATE NONAUTOMATED: CPT | Performed by: EMERGENCY MEDICINE

## 2022-08-17 PROCEDURE — 80053 COMPREHEN METABOLIC PANEL: CPT | Performed by: EMERGENCY MEDICINE

## 2022-08-17 PROCEDURE — 25500020 PHARM REV CODE 255: Performed by: EMERGENCY MEDICINE

## 2022-08-17 PROCEDURE — 99285 EMERGENCY DEPT VISIT HI MDM: CPT | Mod: 25

## 2022-08-17 PROCEDURE — 96375 TX/PRO/DX INJ NEW DRUG ADDON: CPT

## 2022-08-17 PROCEDURE — 96374 THER/PROPH/DIAG INJ IV PUSH: CPT | Mod: 59

## 2022-08-17 PROCEDURE — A9577 INJ MULTIHANCE: HCPCS | Performed by: EMERGENCY MEDICINE

## 2022-08-17 RX ORDER — POLYETHYLENE GLYCOL 3350 17 G/17G
17 POWDER, FOR SOLUTION ORAL DAILY
Status: DISCONTINUED | OUTPATIENT
Start: 2022-08-18 | End: 2022-08-22 | Stop reason: HOSPADM

## 2022-08-17 RX ORDER — ACETAMINOPHEN 325 MG/1
650 TABLET ORAL EVERY 8 HOURS PRN
Status: DISCONTINUED | OUTPATIENT
Start: 2022-08-18 | End: 2022-08-18

## 2022-08-17 RX ORDER — HYDROMORPHONE HYDROCHLORIDE 2 MG/ML
1 INJECTION, SOLUTION INTRAMUSCULAR; INTRAVENOUS; SUBCUTANEOUS ONCE
Status: COMPLETED | OUTPATIENT
Start: 2022-08-17 | End: 2022-08-17

## 2022-08-17 RX ORDER — TALC
6 POWDER (GRAM) TOPICAL NIGHTLY PRN
Status: DISCONTINUED | OUTPATIENT
Start: 2022-08-18 | End: 2022-08-22 | Stop reason: HOSPADM

## 2022-08-17 RX ORDER — FAMOTIDINE 20 MG/1
20 TABLET, FILM COATED ORAL 2 TIMES DAILY
Status: DISCONTINUED | OUTPATIENT
Start: 2022-08-18 | End: 2022-08-20 | Stop reason: SDUPTHER

## 2022-08-17 RX ORDER — ONDANSETRON 2 MG/ML
4 INJECTION INTRAMUSCULAR; INTRAVENOUS EVERY 8 HOURS PRN
Status: DISCONTINUED | OUTPATIENT
Start: 2022-08-18 | End: 2022-08-22 | Stop reason: HOSPADM

## 2022-08-17 RX ORDER — ONDANSETRON 2 MG/ML
4 INJECTION INTRAMUSCULAR; INTRAVENOUS
Status: COMPLETED | OUTPATIENT
Start: 2022-08-17 | End: 2022-08-17

## 2022-08-17 RX ORDER — MORPHINE SULFATE 4 MG/ML
4 INJECTION, SOLUTION INTRAMUSCULAR; INTRAVENOUS
Status: COMPLETED | OUTPATIENT
Start: 2022-08-17 | End: 2022-08-17

## 2022-08-17 RX ORDER — SODIUM CHLORIDE 0.9 % (FLUSH) 0.9 %
10 SYRINGE (ML) INJECTION
Status: DISCONTINUED | OUTPATIENT
Start: 2022-08-18 | End: 2022-08-20

## 2022-08-17 RX ORDER — METHOCARBAMOL 100 MG/ML
1000 INJECTION, SOLUTION INTRAMUSCULAR; INTRAVENOUS ONCE
Status: COMPLETED | OUTPATIENT
Start: 2022-08-17 | End: 2022-08-17

## 2022-08-17 RX ORDER — HYDROMORPHONE HYDROCHLORIDE 2 MG/ML
1 INJECTION, SOLUTION INTRAMUSCULAR; INTRAVENOUS; SUBCUTANEOUS
Status: COMPLETED | OUTPATIENT
Start: 2022-08-17 | End: 2022-08-17

## 2022-08-17 RX ORDER — KETOROLAC TROMETHAMINE 30 MG/ML
15 INJECTION, SOLUTION INTRAMUSCULAR; INTRAVENOUS
Status: COMPLETED | OUTPATIENT
Start: 2022-08-17 | End: 2022-08-17

## 2022-08-17 RX ORDER — HYDROMORPHONE HYDROCHLORIDE 2 MG/ML
1 INJECTION, SOLUTION INTRAMUSCULAR; INTRAVENOUS; SUBCUTANEOUS EVERY 4 HOURS PRN
Status: DISCONTINUED | OUTPATIENT
Start: 2022-08-18 | End: 2022-08-22 | Stop reason: HOSPADM

## 2022-08-17 RX ORDER — MORPHINE SULFATE 4 MG/ML
2 INJECTION, SOLUTION INTRAMUSCULAR; INTRAVENOUS EVERY 4 HOURS PRN
Status: DISCONTINUED | OUTPATIENT
Start: 2022-08-18 | End: 2022-08-18

## 2022-08-17 RX ADMIN — MORPHINE SULFATE 4 MG: 4 INJECTION INTRAVENOUS at 06:08

## 2022-08-17 RX ADMIN — HYDROMORPHONE HYDROCHLORIDE 1 MG: 2 INJECTION, SOLUTION INTRAMUSCULAR; INTRAVENOUS; SUBCUTANEOUS at 07:08

## 2022-08-17 RX ADMIN — ONDANSETRON 4 MG: 2 INJECTION INTRAMUSCULAR; INTRAVENOUS at 10:08

## 2022-08-17 RX ADMIN — GADOBENATE DIMEGLUMINE 20 ML: 529 INJECTION, SOLUTION INTRAVENOUS at 08:08

## 2022-08-17 RX ADMIN — HYDROMORPHONE HYDROCHLORIDE 1 MG: 2 INJECTION, SOLUTION INTRAMUSCULAR; INTRAVENOUS; SUBCUTANEOUS at 10:08

## 2022-08-17 RX ADMIN — KETOROLAC TROMETHAMINE 15 MG: 30 INJECTION, SOLUTION INTRAMUSCULAR at 10:08

## 2022-08-17 RX ADMIN — ONDANSETRON 4 MG: 2 INJECTION INTRAMUSCULAR; INTRAVENOUS at 06:08

## 2022-08-17 RX ADMIN — METHOCARBAMOL 1000 MG: 100 INJECTION INTRAMUSCULAR; INTRAVENOUS at 07:08

## 2022-08-17 NOTE — ED PROVIDER NOTES
Encounter Date: 8/17/2022    SCRIBE #1 NOTE: I, Ibrahima Membreno, am scribing for, and in the presence of,  Dr. Gonzalez. I have scribed the following portions of the note - Other sections scribed: HPI, ROS, Physical Exam, MDM, Attending.       History     Chief Complaint   Patient presents with    Back Pain     L2&L3 dissectomy on Monday by dr aquino. Reports worsening back pain and spasms since Monday night. Denies bowel/bladder incontinence. Unable to walk due to pain as of today.      61 y/o CF with history of HTN, NINA and sciatica presents to ED for severe worsening lower back pain onset around midnight on 8/16.  She says that the pain is worse on the L side and that she needed help walking.  Pt had L2 and L3 discectomy on 8/15 with Dr. Aquino.  She says that her buttocks and legs have been constantly spasming, causing pain.  She says that the pain was not present before the surgery.  She called Dr. Aquino's office and was started on a Medrol dose pack, but she says that she is not getting any relief with that, her muscle relaxants or her Norco 5mg.  Pt denies bowel/bladder dysfunction, numbness, nausea or vomiting.    The history is provided by the patient.   Back Pain   This is a new problem. The current episode started two days ago. The problem occurs constantly. The problem has been gradually worsening. Associated with: recent surgery. The pain is present in the sacro-iliac joint and lumbar spine. Exacerbated by: walking. Pertinent negatives include no chest pain, no fever, no numbness, no headaches, no abdominal pain, no bowel incontinence, no perianal numbness, no bladder incontinence, no dysuria and no weakness. She has tried muscle relaxants, bed rest and analgesics for the symptoms. The treatment provided no relief. Risk factors include obesity.     Review of patient's allergies indicates:   Allergen Reactions    Sulfa (sulfonamide antibiotics) Itching and Rash     Past Medical History:   Diagnosis Date     Arthritis     Endometriosis of uterus 2002    GERD (gastroesophageal reflux disease)     Hypertension 2020    Menopause 2002    Hysterectomy    NINA (obstructive sleep apnea)     wears cpap    Sciatica      Past Surgical History:   Procedure Laterality Date    BREAST BIOPSY Right 6/27/2022    Procedure: BIOPSY, BREAST, WITH LUMPECTOMY / Right Major duct excision;  Surgeon: Sintia Red MD;  Location: [facility];  Service: General;  Laterality: Right;    CATARACT EXTRACTION W/ INTRAOCULAR LENS  IMPLANT, BILATERAL      HYSTERECTOMY  2002    SKIN BIOPSY Right     breast     Family History   Problem Relation Age of Onset    Diabetes Mother     Heart failure Mother     Hypertension Mother     Cancer Father     Rheum arthritis Sister     Cancer Brother      Social History     Tobacco Use    Smoking status: Never Smoker    Smokeless tobacco: Never Used   Substance Use Topics    Alcohol use: Never    Drug use: Never     Review of Systems   Constitutional: Negative for chills, diaphoresis and fever.   HENT: Negative for congestion and sore throat.    Eyes: Negative for visual disturbance.   Respiratory: Negative for cough and shortness of breath.    Cardiovascular: Negative for chest pain and palpitations.   Gastrointestinal: Negative for abdominal pain, bowel incontinence, diarrhea, nausea and vomiting.   Genitourinary: Negative for bladder incontinence, dysuria and hematuria.   Musculoskeletal: Positive for back pain.   Skin: Negative for rash.   Neurological: Negative for syncope, weakness, numbness and headaches.   All other systems reviewed and are negative.      Physical Exam     Initial Vitals [08/17/22 1722]   BP Pulse Resp Temp SpO2   126/77 93 20 97.7 °F (36.5 °C) 97 %      MAP       --         Physical Exam    Nursing note and vitals reviewed.  Constitutional: She appears well-developed and well-nourished. She is not diaphoretic. She does not appear ill. No distress.   HENT:   Head:  Normocephalic and atraumatic.   Right Ear: External ear normal.   Left Ear: External ear normal.   Mouth/Throat: Oropharynx is clear and moist.   Eyes: Conjunctivae and EOM are normal. Pupils are equal, round, and reactive to light.   Neck: Neck supple. No tracheal deviation present.   Cardiovascular: Normal rate, regular rhythm, normal heart sounds and intact distal pulses.   No murmur heard.  Pulmonary/Chest: Breath sounds normal. No respiratory distress. She has no wheezes. She has no rhonchi. She has no rales.   Abdominal: Abdomen is soft. Bowel sounds are normal. She exhibits no distension. There is no abdominal tenderness.   No right CVA tenderness.  No left CVA tenderness.   Musculoskeletal:         General: Normal range of motion.      Cervical back: Neck supple.     Neurological: She is alert and oriented to person, place, and time. She has normal strength. No cranial nerve deficit or sensory deficit. GCS score is 15. GCS eye subscore is 4. GCS verbal subscore is 5. GCS motor subscore is 6.   Skin: Skin is warm and dry. Capillary refill takes less than 2 seconds. No rash noted. No pallor.   Psychiatric: She has a normal mood and affect. Her behavior is normal.         ED Course   Procedures  Labs Reviewed   C-REACTIVE PROTEIN - Abnormal; Notable for the following components:       Result Value    C-Reactive Protein 142.90 (*)     All other components within normal limits   SEDIMENTATION RATE, AUTOMATED - Abnormal; Notable for the following components:    Sed Rate 53 (*)     All other components within normal limits   CBC WITH DIFFERENTIAL - Abnormal; Notable for the following components:    WBC 16.3 (*)     MCHC 32.7 (*)     Neut # 12.2 (*)     Mono # 1.48 (*)     IG# 0.12 (*)     All other components within normal limits   CBC W/ AUTO DIFFERENTIAL    Narrative:     The following orders were created for panel order CBC auto differential.  Procedure                               Abnormality         Status                      ---------                               -----------         ------                     CBC with Differential[[id number]]        Abnormal            Final result                 Please view results for these tests on the individual orders.   COMPREHENSIVE METABOLIC PANEL   TYPE & SCREEN          Imaging Results          MRI Lumbar Spine W WO Cont (Preliminary result)  Result time 08/17/22 20:32:39    Preliminary result by Interface, Rad Results In (08/17/22 20:32:39)                 Narrative:    START OF REPORT:  Technique: Standard axial sagittal and coronal lumbar spine MRI sequences were performed without and with contrast.    Comparison: Comparison is with study dated â2022-07-21 15:24:42â.    Findings:  Post- surgical changes: There is interval right hemilaminectomy at L3 and L4 with postoperative changes and associated soft tissue enhancement in the right posterior paraspinal soft tissues at these levels. There is associated 2 x 0.9 cm fluid collection in the right posterior paraspinal soft tissues at the level of L4 vertebra (series 7 image 29) with susceptibility artifact. This may reflect postoperative hematoma versus seroma. There is an approximately 1.3 x 0.6 x 5 cm fluid collection in the posterior epidural space at the level of L3 through L5 (series 7 image 26-39) with a focus of blooming within this collection (series 6 image 27). This is new since the prior examination and may reflect postoperative collection/ abscess. There is associated mass effect with effacement of thecal sac and crowding of the cauda equina nerve roots at L3-L4.  Post contrast: Evaluation of the post contrast sequence is limited due to motion artifact.  Distal cord and conus medullaris: Spinal cord and conus medullaris are unremarkable.  Cauda equina and intrathecal contents: There is persistence of severe canal stenosis at L3-L4 secondary to posterior epidural collection with effacement of the thecal sac and  crowding of the cauda equina nerve roots.  Spinal Canal: There is interval right hemilaminectomy with possible microdiscectomy at L2-L3. However there is persistent moderate canal stenosis at this level.  Anatomy: Unremarkable.  Alignment:  Curvature: Again noted is levoscoliosis of the lower lumbar spine is seen centered at L4.  Degenerative changes: Multilevel degenerative disc and joint disease is again seen.  Integrity of the bone, bone marrow and discs:  Bone: Vertebral body heights are maintained.  Bone marrow: Aside from degenerative changes no abnormal marrow signal is identified.      Impression:  1. There is interval right hemilaminectomy at L3 and L4 with postoperative changes and associated soft tissue enhancement in the right posterior paraspinal soft tissues at these levels. There is associated 2 x 0.9 cm fluid collection in the right posterior paraspinal soft tissues at the level of L4 vertebra (series 7 image 29) with susceptibility artifact. This may reflect postoperative hematoma versus seroma. There is an approximately 1.3 x 0.6 x 5 cm fluid collection in the posterior epidural space at the level of L3 through L5 (series 7 image 26-39) with a focus of blooming within this collection (series 6 image 27). This is new since the prior examination and may reflect postoperative collection/ abscess. There is associated mass effect with effacement of thecal sac and crowding of the cauda equina nerve roots at L3-L4. Correlate with clinical and laboratory findings and recommend correlation with postoperative CT scan if and when made available.  2. There is interval right hemilaminectomy with possible microdiscectomy at L2-L3. However there is persistent moderate canal stenosis at this level.  3. Details and other findings, as described above.                      Preliminary result by Davian Mena MD (08/17/22 20:32:39)                 Narrative:    START OF REPORT:  Technique: Standard axial sagittal  and coronal lumbar spine MRI sequences were performed without and with contrast.    Comparison: Comparison is with study dated â2022-07-21 15:24:42â.    Findings:  Post- surgical changes: There is interval right hemilaminectomy at L3 and L4 with postoperative changes and associated soft tissue enhancement in the right posterior paraspinal soft tissues at these levels. There is associated 2 x 0.9 cm fluid collection in the right posterior paraspinal soft tissues at the level of L4 vertebra (series 7 image 29) with susceptibility artifact. This may reflect postoperative hematoma versus seroma. There is an approximately 1.3 x 0.6 x 5 cm fluid collection in the posterior epidural space at the level of L3 through L5 (series 7 image 26-39) with a focus of blooming within this collection (series 6 image 27). This is new since the prior examination and may reflect postoperative collection/ abscess. There is associated mass effect with effacement of thecal sac and crowding of the cauda equina nerve roots at L3-L4.  Post contrast: Evaluation of the post contrast sequence is limited due to motion artifact.  Distal cord and conus medullaris: Spinal cord and conus medullaris are unremarkable.  Cauda equina and intrathecal contents: There is persistence of severe canal stenosis at L3-L4 secondary to posterior epidural collection with effacement of the thecal sac and crowding of the cauda equina nerve roots.  Spinal Canal: There is interval right hemilaminectomy with possible microdiscectomy at L2-L3. However there is persistent moderate canal stenosis at this level.  Anatomy: Unremarkable.  Alignment:  Curvature: Again noted is levoscoliosis of the lower lumbar spine is seen centered at L4.  Degenerative changes: Multilevel degenerative disc and joint disease is again seen.  Integrity of the bone, bone marrow and discs:  Bone: Vertebral body heights are maintained.  Bone marrow: Aside from degenerative changes no abnormal  marrow signal is identified.      Impression:  1. There is interval right hemilaminectomy at L3 and L4 with postoperative changes and associated soft tissue enhancement in the right posterior paraspinal soft tissues at these levels. There is associated 2 x 0.9 cm fluid collection in the right posterior paraspinal soft tissues at the level of L4 vertebra (series 7 image 29) with susceptibility artifact. This may reflect postoperative hematoma versus seroma. There is an approximately 1.3 x 0.6 x 5 cm fluid collection in the posterior epidural space at the level of L3 through L5 (series 7 image 26-39) with a focus of blooming within this collection (series 6 image 27). This is new since the prior examination and may reflect postoperative collection/ abscess. There is associated mass effect with effacement of thecal sac and crowding of the cauda equina nerve roots at L3-L4. Correlate with clinical and laboratory findings and recommend correlation with postoperative CT scan if and when made available.  2. There is interval right hemilaminectomy with possible microdiscectomy at L2-L3. However there is persistent moderate canal stenosis at this level.  3. Details and other findings, as described above.                                   Medications   sodium chloride 0.9% flush 10 mL (has no administration in time range)   melatonin tablet 6 mg (has no administration in time range)   acetaminophen tablet 650 mg (has no administration in time range)   morphine injection 2 mg (has no administration in time range)   HYDROmorphone (PF) injection 1 mg (has no administration in time range)   polyethylene glycol packet 17 g (has no administration in time range)   famotidine tablet 20 mg (has no administration in time range)   ondansetron injection 4 mg (has no administration in time range)   morphine injection 4 mg (4 mg Intravenous Given 8/17/22 1816)   ondansetron injection 4 mg (4 mg Intravenous Given 8/17/22 1816)    HYDROmorphone (PF) injection 1 mg (1 mg Intravenous Given 8/17/22 1950)   methocarbamoL injection 1,000 mg (1,000 mg Intravenous Given 8/17/22 1950)   gadobenate dimeglumine (MULTIHANCE) injection 20 mL (20 mLs Intravenous Given 8/17/22 2038)   HYDROmorphone (PF) injection 1 mg (1 mg Intravenous Given 8/17/22 2240)   ondansetron injection 4 mg (4 mg Intravenous Given 8/17/22 2240)   ketorolac injection 15 mg (15 mg Intravenous Given 8/17/22 2240)     Medical Decision Making:   History:   Old Medical Records: I decided to obtain old medical records.  Old Records Summarized: records from previous admission(s).  Initial Assessment:   Back pain post op, on steroids since yesterday, unable to ambulate  Differential Diagnosis:   Infection, hematoma, seroma, post op pain  Clinical Tests:   Lab Tests: Ordered and Reviewed  Radiological Study: Ordered and Reviewed  ED Management:  Pain control, labs, mri, discussion with neurosurgery admit, npo to take to or  Other:   I have discussed this case with another health care provider.          Scribe Attestation:   Scribe #1: I performed the above scribed service and the documentation accurately describes the services I performed. I attest to the accuracy of the note.    Attending Attestation:           Physician Attestation for Scribe:  Physician Attestation Statement for Scribe #1: I, reviewed documentation, as scribed by Ibrahima Membreno in my presence, and it is both accurate and complete.             ED Course as of 08/18/22 0050   Wed Aug 17, 2022   1830 Spoke with Dr. Aquino who recommended MRI with and without contrast [KM]   2002 WBC(!): 16.3 [KM]   2002 Sed Rate(!): 53 [KM]   2002 CRP(!): 142.90 [KM]   2009 Handoff to Dr Leonard pending MRI results and disposition  [KM]   2145 Spoke with Dr. Aquino who has reviewed images and unless official read is drastically different can receive steroids, pain control and f/u [BS]   2227 She is on medrol dosepack that was started  yesterday, is only on norco 5 which can be increased, ant has been taking robaxin, prior to this procedure was taking indomethacin for pain [BS]   2229 Still awaiting MRI read, called radiology, it has been sent to [facility] and they are still waiting for the read [BS]   8614 Discussed with Dr. Aquino [BS]   4103 Admit, npo, type and screen [BS]      ED Course User Index  [BS] Cathy Leonard MD  [KM] Stephany Gonzalez MD             Clinical Impression:   Final diagnoses:  [G89.18] Acute post-operative pain  [M54.42, M54.41] Acute bilateral low back pain with bilateral sciatica (Primary)          ED Disposition Condition    Admit               Cathy Leonard MD  08/18/22 0053

## 2022-08-18 ENCOUNTER — ANESTHESIA (OUTPATIENT)
Dept: SURGERY | Facility: HOSPITAL | Age: 62
DRG: 940 | End: 2022-08-18
Payer: COMMERCIAL

## 2022-08-18 ENCOUNTER — ANESTHESIA EVENT (OUTPATIENT)
Dept: SURGERY | Facility: HOSPITAL | Age: 62
DRG: 940 | End: 2022-08-18
Payer: COMMERCIAL

## 2022-08-18 PROBLEM — M54.16 LUMBAR RADICULOPATHY: Status: ACTIVE | Noted: 2022-08-18

## 2022-08-18 LAB
GROUP & RH: NORMAL
INDIRECT COOMBS GEL: NORMAL

## 2022-08-18 PROCEDURE — 63600175 PHARM REV CODE 636 W HCPCS

## 2022-08-18 PROCEDURE — 27000221 HC OXYGEN, UP TO 24 HOURS

## 2022-08-18 PROCEDURE — 25000003 PHARM REV CODE 250: Performed by: NURSE ANESTHETIST, CERTIFIED REGISTERED

## 2022-08-18 PROCEDURE — 63600175 PHARM REV CODE 636 W HCPCS: Performed by: NURSE ANESTHETIST, CERTIFIED REGISTERED

## 2022-08-18 PROCEDURE — 36000711: Performed by: NEUROLOGICAL SURGERY

## 2022-08-18 PROCEDURE — 36000710: Performed by: NEUROLOGICAL SURGERY

## 2022-08-18 PROCEDURE — 63600175 PHARM REV CODE 636 W HCPCS: Performed by: NEUROLOGICAL SURGERY

## 2022-08-18 PROCEDURE — 11000001 HC ACUTE MED/SURG PRIVATE ROOM

## 2022-08-18 PROCEDURE — 25000003 PHARM REV CODE 250

## 2022-08-18 PROCEDURE — 71000033 HC RECOVERY, INTIAL HOUR: Performed by: NEUROLOGICAL SURGERY

## 2022-08-18 PROCEDURE — 25000003 PHARM REV CODE 250: Performed by: NEUROLOGICAL SURGERY

## 2022-08-18 PROCEDURE — 25000003 PHARM REV CODE 250: Performed by: EMERGENCY MEDICINE

## 2022-08-18 PROCEDURE — 97162 PT EVAL MOD COMPLEX 30 MIN: CPT

## 2022-08-18 PROCEDURE — 71000015 HC POSTOP RECOV 1ST HR: Performed by: NEUROLOGICAL SURGERY

## 2022-08-18 PROCEDURE — 37000009 HC ANESTHESIA EA ADD 15 MINS: Performed by: NEUROLOGICAL SURGERY

## 2022-08-18 PROCEDURE — 71000016 HC POSTOP RECOV ADDL HR: Performed by: NEUROLOGICAL SURGERY

## 2022-08-18 PROCEDURE — 71000039 HC RECOVERY, EACH ADD'L HOUR: Performed by: NEUROLOGICAL SURGERY

## 2022-08-18 PROCEDURE — 63600175 PHARM REV CODE 636 W HCPCS: Performed by: ANESTHESIOLOGY

## 2022-08-18 PROCEDURE — 37000008 HC ANESTHESIA 1ST 15 MINUTES: Performed by: NEUROLOGICAL SURGERY

## 2022-08-18 PROCEDURE — 27201423 OPTIME MED/SURG SUP & DEVICES STERILE SUPPLY: Performed by: NEUROLOGICAL SURGERY

## 2022-08-18 RX ORDER — HYDROCODONE BITARTRATE AND ACETAMINOPHEN 10; 325 MG/1; MG/1
TABLET ORAL
Status: COMPLETED
Start: 2022-08-18 | End: 2022-08-18

## 2022-08-18 RX ORDER — PROPOFOL 10 MG/ML
VIAL (ML) INTRAVENOUS
Status: DISCONTINUED | OUTPATIENT
Start: 2022-08-18 | End: 2022-08-18

## 2022-08-18 RX ORDER — SODIUM CHLORIDE 9 MG/ML
INJECTION, SOLUTION INTRAVENOUS CONTINUOUS
Status: DISCONTINUED | OUTPATIENT
Start: 2022-08-18 | End: 2022-08-22 | Stop reason: HOSPADM

## 2022-08-18 RX ORDER — METHOCARBAMOL 750 MG/1
750 TABLET, FILM COATED ORAL 3 TIMES DAILY
Status: DISPENSED | OUTPATIENT
Start: 2022-08-18 | End: 2022-08-20

## 2022-08-18 RX ORDER — LIDOCAINE HYDROCHLORIDE AND EPINEPHRINE 5; 5 MG/ML; UG/ML
INJECTION, SOLUTION INFILTRATION; PERINEURAL
Status: DISCONTINUED | OUTPATIENT
Start: 2022-08-18 | End: 2022-08-18 | Stop reason: HOSPADM

## 2022-08-18 RX ORDER — HYDROCODONE BITARTRATE AND ACETAMINOPHEN 10; 325 MG/1; MG/1
1 TABLET ORAL EVERY 4 HOURS PRN
Status: DISCONTINUED | OUTPATIENT
Start: 2022-08-18 | End: 2022-08-22 | Stop reason: HOSPADM

## 2022-08-18 RX ORDER — ROCURONIUM BROMIDE 10 MG/ML
INJECTION, SOLUTION INTRAVENOUS
Status: DISCONTINUED | OUTPATIENT
Start: 2022-08-18 | End: 2022-08-18

## 2022-08-18 RX ORDER — CALCIUM CARBONATE 200(500)MG
500 TABLET,CHEWABLE ORAL DAILY PRN
Status: DISCONTINUED | OUTPATIENT
Start: 2022-08-18 | End: 2022-08-22 | Stop reason: HOSPADM

## 2022-08-18 RX ORDER — GLYCOPYRROLATE 0.2 MG/ML
INJECTION INTRAMUSCULAR; INTRAVENOUS
Status: DISCONTINUED | OUTPATIENT
Start: 2022-08-18 | End: 2022-08-18

## 2022-08-18 RX ORDER — AMOXICILLIN 250 MG
2 CAPSULE ORAL 2 TIMES DAILY
Status: DISCONTINUED | OUTPATIENT
Start: 2022-08-18 | End: 2022-08-22 | Stop reason: HOSPADM

## 2022-08-18 RX ORDER — METHOCARBAMOL 500 MG/1
TABLET, FILM COATED ORAL
Status: COMPLETED
Start: 2022-08-18 | End: 2022-08-18

## 2022-08-18 RX ORDER — ONDANSETRON 2 MG/ML
4 INJECTION INTRAMUSCULAR; INTRAVENOUS DAILY PRN
Status: DISCONTINUED | OUTPATIENT
Start: 2022-08-18 | End: 2022-08-22 | Stop reason: HOSPADM

## 2022-08-18 RX ORDER — SODIUM CHLORIDE 0.9 % (FLUSH) 0.9 %
10 SYRINGE (ML) INJECTION
Status: DISCONTINUED | OUTPATIENT
Start: 2022-08-18 | End: 2022-08-20

## 2022-08-18 RX ORDER — CEFAZOLIN SODIUM 2 G/50ML
2 SOLUTION INTRAVENOUS
Status: COMPLETED | OUTPATIENT
Start: 2022-08-18 | End: 2022-08-19

## 2022-08-18 RX ORDER — PROCHLORPERAZINE EDISYLATE 5 MG/ML
10 INJECTION INTRAMUSCULAR; INTRAVENOUS EVERY 6 HOURS PRN
Status: DISCONTINUED | OUTPATIENT
Start: 2022-08-18 | End: 2022-08-22 | Stop reason: HOSPADM

## 2022-08-18 RX ORDER — DEXAMETHASONE SODIUM PHOSPHATE 4 MG/ML
INJECTION, SOLUTION INTRA-ARTICULAR; INTRALESIONAL; INTRAMUSCULAR; INTRAVENOUS; SOFT TISSUE
Status: DISCONTINUED | OUTPATIENT
Start: 2022-08-18 | End: 2022-08-18

## 2022-08-18 RX ORDER — ONDANSETRON HYDROCHLORIDE 2 MG/ML
INJECTION, SOLUTION INTRAMUSCULAR; INTRAVENOUS
Status: DISCONTINUED | OUTPATIENT
Start: 2022-08-18 | End: 2022-08-18

## 2022-08-18 RX ORDER — HYDROCODONE BITARTRATE AND ACETAMINOPHEN 7.5; 325 MG/1; MG/1
2 TABLET ORAL EVERY 4 HOURS PRN
Status: DISCONTINUED | OUTPATIENT
Start: 2022-08-18 | End: 2022-08-22 | Stop reason: HOSPADM

## 2022-08-18 RX ORDER — BISACODYL 10 MG
10 SUPPOSITORY, RECTAL RECTAL DAILY
Status: DISCONTINUED | OUTPATIENT
Start: 2022-08-18 | End: 2022-08-22 | Stop reason: HOSPADM

## 2022-08-18 RX ORDER — SODIUM CHLORIDE 0.9 % (FLUSH) 0.9 %
10 SYRINGE (ML) INJECTION
Status: DISCONTINUED | OUTPATIENT
Start: 2022-08-18 | End: 2022-08-22 | Stop reason: HOSPADM

## 2022-08-18 RX ORDER — HYDROMORPHONE HYDROCHLORIDE 2 MG/ML
0.4 INJECTION, SOLUTION INTRAMUSCULAR; INTRAVENOUS; SUBCUTANEOUS EVERY 5 MIN PRN
Status: DISCONTINUED | OUTPATIENT
Start: 2022-08-18 | End: 2022-08-22 | Stop reason: HOSPADM

## 2022-08-18 RX ORDER — FENTANYL CITRATE 50 UG/ML
INJECTION, SOLUTION INTRAMUSCULAR; INTRAVENOUS
Status: DISCONTINUED | OUTPATIENT
Start: 2022-08-18 | End: 2022-08-18

## 2022-08-18 RX ORDER — MAG HYDROX/ALUMINUM HYD/SIMETH 200-200-20
30 SUSPENSION, ORAL (FINAL DOSE FORM) ORAL EVERY 4 HOURS PRN
Status: DISCONTINUED | OUTPATIENT
Start: 2022-08-18 | End: 2022-08-22 | Stop reason: HOSPADM

## 2022-08-18 RX ORDER — CEFAZOLIN SODIUM 1 G/3ML
INJECTION, POWDER, FOR SOLUTION INTRAMUSCULAR; INTRAVENOUS
Status: DISCONTINUED | OUTPATIENT
Start: 2022-08-18 | End: 2022-08-18

## 2022-08-18 RX ORDER — CEFAZOLIN SODIUM 1 G/3ML
INJECTION, POWDER, FOR SOLUTION INTRAMUSCULAR; INTRAVENOUS
Status: DISCONTINUED | OUTPATIENT
Start: 2022-08-18 | End: 2022-08-18 | Stop reason: HOSPADM

## 2022-08-18 RX ORDER — ACETAMINOPHEN 325 MG/1
650 TABLET ORAL EVERY 6 HOURS PRN
Status: DISCONTINUED | OUTPATIENT
Start: 2022-08-18 | End: 2022-08-22 | Stop reason: HOSPADM

## 2022-08-18 RX ADMIN — CALCIUM CARBONATE (ANTACID) CHEW TAB 500 MG 500 MG: 500 CHEW TAB at 05:08

## 2022-08-18 RX ADMIN — FAMOTIDINE 20 MG: 20 TABLET, FILM COATED ORAL at 08:08

## 2022-08-18 RX ADMIN — SUGAMMADEX 100 MG: 100 INJECTION, SOLUTION INTRAVENOUS at 06:08

## 2022-08-18 RX ADMIN — DEXAMETHASONE SODIUM PHOSPHATE 4 MG: 4 INJECTION, SOLUTION INTRA-ARTICULAR; INTRALESIONAL; INTRAMUSCULAR; INTRAVENOUS; SOFT TISSUE at 06:08

## 2022-08-18 RX ADMIN — CEFAZOLIN SODIUM 2 G: 2 SOLUTION INTRAVENOUS at 02:08

## 2022-08-18 RX ADMIN — HYDROMORPHONE HYDROCHLORIDE 0.4 MG: 2 INJECTION, SOLUTION INTRAMUSCULAR; INTRAVENOUS; SUBCUTANEOUS at 11:08

## 2022-08-18 RX ADMIN — METHOCARBAMOL 750 MG: 500 TABLET ORAL at 05:08

## 2022-08-18 RX ADMIN — FENTANYL CITRATE 100 MCG: 50 INJECTION, SOLUTION INTRAMUSCULAR; INTRAVENOUS at 05:08

## 2022-08-18 RX ADMIN — HYDROCODONE BITARTRATE AND ACETAMINOPHEN 1 TABLET: 10; 325 TABLET ORAL at 03:08

## 2022-08-18 RX ADMIN — METHOCARBAMOL 750 MG: 750 TABLET ORAL at 05:08

## 2022-08-18 RX ADMIN — METHOCARBAMOL 750 MG: 750 TABLET ORAL at 08:08

## 2022-08-18 RX ADMIN — HYDROMORPHONE HYDROCHLORIDE 0.4 MG: 2 INJECTION, SOLUTION INTRAMUSCULAR; INTRAVENOUS; SUBCUTANEOUS at 10:08

## 2022-08-18 RX ADMIN — PROPOFOL 50 MG: 10 INJECTION, EMULSION INTRAVENOUS at 06:08

## 2022-08-18 RX ADMIN — ONDANSETRON 4 MG: 2 INJECTION INTRAMUSCULAR; INTRAVENOUS at 06:08

## 2022-08-18 RX ADMIN — HYDROCODONE BITARTRATE AND ACETAMINOPHEN 2 TABLET: 7.5; 325 TABLET ORAL at 08:08

## 2022-08-18 RX ADMIN — CEFAZOLIN 2 G: 330 INJECTION, POWDER, FOR SOLUTION INTRAMUSCULAR; INTRAVENOUS at 06:08

## 2022-08-18 RX ADMIN — SODIUM CHLORIDE: 9 INJECTION, SOLUTION INTRAVENOUS at 01:08

## 2022-08-18 RX ADMIN — CEFAZOLIN SODIUM 2 G: 2 SOLUTION INTRAVENOUS at 08:08

## 2022-08-18 RX ADMIN — PROPOFOL 150 MG: 10 INJECTION, EMULSION INTRAVENOUS at 05:08

## 2022-08-18 RX ADMIN — HYDROCODONE BITARTRATE AND ACETAMINOPHEN 1 TABLET: 10; 325 TABLET ORAL at 11:08

## 2022-08-18 RX ADMIN — GLYCOPYRROLATE 0.2 MG: 0.2 INJECTION INTRAMUSCULAR; INTRAVENOUS at 06:08

## 2022-08-18 RX ADMIN — SODIUM CHLORIDE, SODIUM GLUCONATE, SODIUM ACETATE, POTASSIUM CHLORIDE AND MAGNESIUM CHLORIDE: 526; 502; 368; 37; 30 INJECTION, SOLUTION INTRAVENOUS at 05:08

## 2022-08-18 RX ADMIN — SENNOSIDES AND DOCUSATE SODIUM 2 TABLET: 50; 8.6 TABLET ORAL at 08:08

## 2022-08-18 RX ADMIN — ROCURONIUM BROMIDE 50 MG: 10 SOLUTION INTRAVENOUS at 05:08

## 2022-08-18 NOTE — ANESTHESIA POSTPROCEDURE EVALUATION
Anesthesia Post Evaluation    Patient: Marina Dixon    Procedure(s) Performed: Procedure(s) (LRB):  LAMINECTOMY, SPINE, LUMBAR (N/A)          Patient location during evaluation: PACU  Post-procedure mental status: @ basline.  Post-procedure vital signs: reviewed and stable  Pain management: adequate      Anesthetic complications: no      Cardiovascular status: blood pressure returned to baseline  Respiratory status: @ baseline.  Hydration status: euvolemic            Vitals Value Taken Time   /96 08/18/22 0831   Temp 98 08/18/22 0833   Pulse 84 08/18/22 0832   Resp 16 08/18/22 0832   SpO2 93 % 08/18/22 0832   Vitals shown include unvalidated device data.      No case tracking events are documented in the log.      Pain/Rene Score: Pain Rating Prior to Med Admin: 8 (8/17/2022 10:40 PM)  Rene Score: 9 (8/18/2022  7:40 AM)

## 2022-08-18 NOTE — H&P
[facility]                       [address] [place] [id number]    PATIENT NAME:       MARINA DIXON  YOB: 1960  CSN:                [id number]   MRN:                [id number]  ADMIT DATE:         08/17/2022 17:43:00  PHYSICIAN:          Osvaldo Aquino MD                            CONSULTATION    DATE OF CONSULT:      Ms. Marina Dixon is a 62-year-old pleasant lady who had surgery done 2 days   ago, finally went home after right L2-3 and L3-4 MIS hemilaminectomy and   diskectomy.  She had gone home and walked and she called the day before.  Next   day, some back pain, leg pain.  We started some steroids.  She called again, and we   told her to go to the emergency room because she was concerned about some   difficulty with the urgency.  She came to the hospital for evaluation/ imaging.  She is awake,   alert.  She had good strength in the legs, but severe pain and some numbness in   the buttock area.  We obtained MRI and the MR Radiology read it as fluid   collection with pressure on the thecal sac.  I myself reviewed pre and post MRIs   and there is some narrowing, but not as significant as what the radiologist   thinks.  However, because of her pain level increased I will proceed to do a   decompression and opening, removal of fluid drainage and further decompression,   and possible diskectomy further.  I discussed with the  at length and   showed him the pictures myself.  He understands why I am doing this, to make   sure she is okay.  Again, they want me to proceed with surgery.  Consent was   obtained with the family and they want me to proceed with surgery.        ______________________________  Osvaldo Aquino MD    IM/AQS  DD:  08/18/2022  Time:  05:24AM  DT:  08/18/2022  Time:  06:11AM  Job #:  [id number]      CONSULTATION

## 2022-08-18 NOTE — BRIEF OP NOTE
[facility] - Periop Services  Brief Operative Note    SUMMARY     Surgery Date: 8/18/2022     Surgeon(s) and Role:     * Osvaldo Aquino MD - Primary    Assisting Surgeon: EDNA Matthews    Pre-op Diagnosis:  Abscess after procedure [T81.49XA]    Post-op Diagnosis:  Post-Op Diagnosis Codes:     * Abscess after procedure [T81.49XA]    Procedure(s) (LRB):  LAMINECTOMY, SPINE, LUMBAR (N/A)     Redo decompression laminectomy for post-operative hematoma (R L2/3, L3/4 (MIS) formaniotomies w/ L2/3 discectomy on 08/15/2022), microscope assisted dissection with C-arm      Anesthesia: General    Operative Findings: Dictated     Estimated Blood Loss: * No values recorded between 8/18/2022  6:07 AM and 8/18/2022  6:52 AM *    Estimated Blood Loss has been documented.         Specimens:   Specimen (24h ago, onward)            None          [id number]

## 2022-08-18 NOTE — PT/OT/SLP EVAL
Physical Therapy Evaluation    Patient Name:  Marina Dixon   MRN:  [id number]    Recommendations:     Discharge Recommendations:  home with home health   Discharge Equipment Recommendations: walker, rolling   Barriers to discharge: None    Assessment:     Marina Dixon is a 62 y.o. female admitted with a medical diagnosis of Lumbar radiculopathy.  She presents with the following impairments/functional limitations:  weakness, gait instability, impaired endurance, impaired balance, impaired functional mobility, decreased safety awareness. The pt lives at home with her , with no AD, and independently. Pt would benefit from  PT services upon discharge.    Rehab Prognosis: Good; patient would benefit from acute skilled PT services to address these deficits and reach maximum level of function.    Recent Surgery: Procedure(s) (LRB):  LAMINECTOMY, SPINE, LUMBAR (N/A) Day of Surgery    Plan:     During this hospitalization, patient to be seen daily to address the identified rehab impairments via gait training, therapeutic activities, therapeutic exercises and progress toward the following goals:    · Plan of Care Expires:  09/08/22    Subjective     Chief Complaint: None  Patient/Family Comments/goals: return to PLOF  Pain/Comfort:  ·      Patients cultural, spiritual, Evangelical conflicts given the current situation:      Living Environment:  Home with  in SL home, 2 steps to enter, with 'post' to hold onto at steps.   Prior to admission, patients level of function was independent.  Equipment used at home: none.  DME owned (not currently used): none.  Upon discharge, patient will have assistance from .    Objective:     Communicated with NSG prior to session.  Patient found HOB elevated with peripheral IV, telemetry, pulse ox (continuous), oxygen, blood pressure cuff  upon PT entry to room.    General Precautions: Standard, fall   Orthopedic Precautions:N/A   Braces: N/A  Respiratory Status: Nasal  cannula, flow 2 L/min    Exams:  · RLE ROM: WFL  · RLE Strength: WFL  · LLE ROM: WFL  · LLE Strength: WFL    Functional Mobility:  · Bed Mobility:     · Supine to Sit: minimum assistance  · Sit to Supine: minimum assistance  · Transfers:     · Sit to Stand:  minimum assistance with rolling walker  · Gait: 2 lateral steps with RW, min A    Patient left HOB elevated with all lines intact, call button in reach and NSG present.    GOALS:   Multidisciplinary Problems     Physical Therapy Goals        Problem: Physical Therapy    Goal Priority Disciplines Outcome Goal Variances Interventions   Physical Therapy Goal     PT, PT/OT Ongoing, Progressing     Description: Goals to be met by: 22    Patient will increase functional independence with mobility by performin. Supine to sit with Modified [place]  2. Sit to stand transfer with Modified [place]  3. Gait  x 200 feet with Modified [place] using Rolling Walker vs LRAD.                      History:     Past Medical History:   Diagnosis Date    Arthritis     Endometriosis of uterus     GERD (gastroesophageal reflux disease)     Hypertension 2020    Menopause     Hysterectomy    NINA (obstructive sleep apnea)     wears cpap    Sciatica        Past Surgical History:   Procedure Laterality Date    BREAST BIOPSY Right 2022    Procedure: BIOPSY, BREAST, WITH LUMPECTOMY / Right Major duct excision;  Surgeon: Sintia Red MD;  Location: [facility];  Service: General;  Laterality: Right;    CATARACT EXTRACTION W/ INTRAOCULAR LENS  IMPLANT, BILATERAL      HYSTERECTOMY      SKIN BIOPSY Right     breast       Time Tracking:     PT Received On: 22  PT Start Time: 1430     PT Stop Time: 1446  PT Total Time (min): 16 min     Billable Minutes: Evaluation 16 mins      2022

## 2022-08-18 NOTE — PROGRESS NOTES
Pt and nurse present for report. Pt attached to v/s machine and vitals wNL. Post op orders reviewed.Sx site, IV, and [place] all examined and intact. Pt updated family herself. Everyone understands pt info with no further questions.

## 2022-08-18 NOTE — PLAN OF CARE
Problem: Physical Therapy  Goal: Physical Therapy Goal  Description: Goals to be met by: 22    Patient will increase functional independence with mobility by performin. Supine to sit with Modified [place]  2. Sit to stand transfer with Modified [place]  3. Gait  x 200 feet with Modified [place] using Rolling Walker vs LRAD.     Outcome: Ongoing, Progressing

## 2022-08-18 NOTE — PROGRESS NOTES
Resting in bed.   Complains of some soreness and spasms.   VERA drain 45ml since surgery.   Moving all extremities.   Can give 750mg Robaxin TID.   SCDs.   PT/OT.

## 2022-08-18 NOTE — ANESTHESIA PREPROCEDURE EVALUATION
08/18/2022  Marina Dixon is a 62 y.o., female for lumbar mainectomy      Last 3 sets of Vitals    Vitals - 1 value per visit 8/18/2022 8/18/2022 8/18/2022   SYSTOLIC 124 137 116   DIASTOLIC 70 81 80   Pulse 92 85 52   Temp - - -   Resp - 18 18   SPO2 97 96 97   Weight (lb) - - -   Weight (kg) - - -   Height - - -   BMI (Calculated) - - -   VISIT REPORT - - -   Pain Score  - - -         Lab Results   Component Value Date    WBC 16.3 (H) 08/17/2022    HGB 13.0 08/17/2022    HCT 39.8 08/17/2022    MCV 89.8 08/17/2022     08/17/2022          BMP  Lab Results   Component Value Date     08/17/2022    K 4.1 08/17/2022    CO2 28 08/17/2022    BUN 19.3 08/17/2022    CREATININE 0.90 08/17/2022    CALCIUM 9.6 08/17/2022    EGFRNONAA 51 06/23/2022        CMP  Sodium Level   Date Value Ref Range Status   08/17/2022 137 136 - 145 mmol/L Final     Potassium Level   Date Value Ref Range Status   08/17/2022 4.1 3.5 - 5.1 mmol/L Final     Carbon Dioxide   Date Value Ref Range Status   08/17/2022 28 23 - 31 mmol/L Final     Blood Urea Nitrogen   Date Value Ref Range Status   08/17/2022 19.3 9.8 - 20.1 mg/dL Final     Creatinine   Date Value Ref Range Status   08/17/2022 0.90 0.55 - 1.02 mg/dL Final     Calcium Level Total   Date Value Ref Range Status   08/17/2022 9.6 8.4 - 10.2 mg/dL Final     Albumin Level   Date Value Ref Range Status   08/17/2022 3.9 3.4 - 4.8 gm/dL Final     Bilirubin Total   Date Value Ref Range Status   08/17/2022 0.6 <=1.5 mg/dL Final     Alkaline Phosphatase   Date Value Ref Range Status   08/17/2022 95 40 - 150 unit/L Final     Aspartate Aminotransferase   Date Value Ref Range Status   08/17/2022 25 5 - 34 unit/L Final     Alanine Aminotransferase   Date Value Ref Range Status   08/17/2022 29 0 - 55 unit/L Final     Estimated GFR-Non    Date Value Ref Range Status    06/23/2022 51 mls/min/1.73/m2 Final        Pre-op Assessment    I have reviewed the Patient Summary Reports.     I have reviewed the Nursing Notes. I have reviewed the NPO Status.   I have reviewed the Medications.     Review of Systems  Anesthesia Hx:  Personal Hx of Anesthesia complications Denies Post-Operative Nausea/Vomiting.  Denies Difficult Intubation.  Denies Dental Injury.   Social:  Non-Smoker    Cardiovascular:   Hypertension  Functional Capacity good / => 4 METS    Pulmonary:   Sleep Apnea, CPAP    Hepatic/GI:   GERD, well controlled        Physical Exam  General: Well nourished, Cooperative, Alert and Oriented    Airway:  Mallampati: II   Mouth Opening: Normal  TM Distance: Normal  Tongue: Normal  Neck ROM: Normal ROM    Dental:  Intact    Chest/Lungs:  Clear to auscultation, Normal Respiratory Rate    Heart:  Rate: Normal  Rhythm: Regular Rhythm        Anesthesia Plan  Type of Anesthesia, risks & benefits discussed:    Anesthesia Type: Gen ETT  Intra-op Monitoring Plan: Standard ASA Monitors  Post Op Pain Control Plan: multimodal analgesia and IV/PO Opioids PRN  Induction:  IV  Airway Plan: Direct  Informed Consent: Informed consent signed with the Patient and all parties understand the risks and agree with anesthesia plan.  All questions answered. Patient consented to blood products? Yes  ASA Score: 2  Day of Surgery Review of History & Physical: H&P Update referred to the surgeon/provider.    Ready For Surgery From Anesthesia Perspective.     .

## 2022-08-18 NOTE — TRANSFER OF CARE
Anesthesia Transfer of Care Note    Patient: Marina Dixon    Procedure(s) Performed: Procedure(s) (LRB):  LAMINECTOMY, SPINE, LUMBAR (N/A)    Patient location: PACU    Anesthesia Type: general    Transport from OR: Transported from OR on room air with adequate spontaneous ventilation    Post pain: adequate analgesia    Post assessment: no apparent anesthetic complications    Post vital signs: stable    Level of consciousness: responds to stimulation    Nausea/Vomiting: no nausea/vomiting    Complications: none    Transfer of care protocol was followed      Last vitals:   Visit Vitals  /80 (BP Location: Left arm, Patient Position: Lying)   Pulse (!) 52   Temp 36.5 °C (97.7 °F)   Resp 18   SpO2 97%

## 2022-08-18 NOTE — OP NOTE
[facility]                       [address] [place] [id number]    PATIENT NAME:      LACIE HOPSON  YOB: 1960  CSN:               [id number]  MRN:               [id number]  ADMIT DATE:        08/17/2022 17:43:00  PHYSICIAN:         Osvaldo Aquino MD                          OPERATIVE REPORT      DATE OF SURGERY:    08/18/2022 00:00:00    SURGEON:  Osvaldo Aquino MD    ASSISTANT: EDNA Matthews    PREOPERATIVE DIAGNOSES:  Right L2-3 hematoma and severe stenosis, possibly L3-4   per Radiology report.    POSTOPERATIVE DIAGNOSES:  Right L2-3 hematoma and severe stenosis, possibly L3-4   per Radiology report.    PROCEDURES:  Redo open L2-3, L3-4 laminectomy, foraminotomy, removal of   postoperative hematoma.  Microscope was used.  A drain was left in place.    COMPLICATIONS:  There were no issues or complications from my standpoint.    INDICATIONS FOR PROCEDURE:  This 62-year-old presents with surgery 2 days ago,   did well, went home that night (Monday night) and then she was complaining of   increasing pain and difficulty.  I talked to her yesterday, on Wednesday, August 17th.  She was complaining of some issues, with possible urinary issues.  I   told her to go to the emergency room, where an MRI showed postop hematoma,   possible stenosis at L3-4.  As a result, she could not move her legs well.    After discussing with her, we are going to proceed to the operating room to   remove the possible hematoma and do a laminectomy.  The risks, benefits, and   consent were discussed.  We spent some time going over everything.  I spent a   good amount of time discussing with her .  They still wanted me to   proceed with surgery.    OPERATIVE PROCEDURE:  In the operating room, intubated, turned prone.  [place]   placed.  Prior to that, the back was sterilely prepped and draped.  Incision   made.  Up and down, we  opened the incision.  We did have some hematoma, but we   went down to the 2 places.  We cleaned up some old blood and did a complete   laminectomy at 3-4, went into 2-3 as well.  Thorough decompression was done.  We   went circumferentially, made sure the thecal sac was nicely opened midline into   the right and left side above and below.  Once that was done, we felt we had   adequate decompression, put a drain, and closed in multiple layers with 0   Vicryl, 3-0 Vicryl running subcutaneous.  There were no additional complications   from my standpoint.  Surgery went well.    I discussed the care with the .        ______________________________  MD JOSE G Tavera/DANN  DD:  08/18/2022  Time:  06:51AM  DT:  08/18/2022  Time:  08:40AM  Job #:  [id number]      OPERATIVE REPORT

## 2022-08-19 LAB
ANION GAP SERPL CALC-SCNC: 9 MEQ/L
BASOPHILS # BLD AUTO: 0.02 X10(3)/MCL (ref 0–0.2)
BASOPHILS NFR BLD AUTO: 0.2 %
BUN SERPL-MCNC: 22.2 MG/DL (ref 9.8–20.1)
CALCIUM SERPL-MCNC: 8.5 MG/DL (ref 8.4–10.2)
CHLORIDE SERPL-SCNC: 103 MMOL/L (ref 98–107)
CO2 SERPL-SCNC: 26 MMOL/L (ref 23–31)
CREAT SERPL-MCNC: 0.9 MG/DL (ref 0.55–1.02)
CREAT/UREA NIT SERPL: 25
EOSINOPHIL # BLD AUTO: 0.14 X10(3)/MCL (ref 0–0.9)
EOSINOPHIL NFR BLD AUTO: 1.3 %
ERYTHROCYTE [DISTWIDTH] IN BLOOD BY AUTOMATED COUNT: 13.6 % (ref 11.5–17)
GFR SERPLBLD CREATININE-BSD FMLA CKD-EPI: >60 MLS/MIN/1.73/M2
GLUCOSE SERPL-MCNC: 100 MG/DL (ref 82–115)
HCT VFR BLD AUTO: 34.8 % (ref 37–47)
HGB BLD-MCNC: 10.9 GM/DL (ref 12–16)
IMM GRANULOCYTES # BLD AUTO: 0.06 X10(3)/MCL (ref 0–0.04)
IMM GRANULOCYTES NFR BLD AUTO: 0.6 %
LYMPHOCYTES # BLD AUTO: 3.31 X10(3)/MCL (ref 0.6–4.6)
LYMPHOCYTES NFR BLD AUTO: 30.5 %
MCH RBC QN AUTO: 29.4 PG (ref 27–31)
MCHC RBC AUTO-ENTMCNC: 31.3 MG/DL (ref 33–36)
MCV RBC AUTO: 93.8 FL (ref 80–94)
MONOCYTES # BLD AUTO: 1 X10(3)/MCL (ref 0.1–1.3)
MONOCYTES NFR BLD AUTO: 9.2 %
NEUTROPHILS # BLD AUTO: 6.3 X10(3)/MCL (ref 2.1–9.2)
NEUTROPHILS NFR BLD AUTO: 58.2 %
NRBC BLD AUTO-RTO: 0 %
PLATELET # BLD AUTO: 251 X10(3)/MCL (ref 130–400)
PMV BLD AUTO: 9.8 FL (ref 7.4–10.4)
POTASSIUM SERPL-SCNC: 3.4 MMOL/L (ref 3.5–5.1)
RBC # BLD AUTO: 3.71 X10(6)/MCL (ref 4.2–5.4)
SODIUM SERPL-SCNC: 138 MMOL/L (ref 136–145)
WBC # SPEC AUTO: 10.9 X10(3)/MCL (ref 4.5–11.5)

## 2022-08-19 PROCEDURE — 63600175 PHARM REV CODE 636 W HCPCS: Performed by: ANESTHESIOLOGY

## 2022-08-19 PROCEDURE — 25000003 PHARM REV CODE 250: Performed by: EMERGENCY MEDICINE

## 2022-08-19 PROCEDURE — 25000003 PHARM REV CODE 250: Performed by: NEUROLOGICAL SURGERY

## 2022-08-19 PROCEDURE — 97116 GAIT TRAINING THERAPY: CPT | Mod: CQ

## 2022-08-19 PROCEDURE — 63600175 PHARM REV CODE 636 W HCPCS: Performed by: NEUROLOGICAL SURGERY

## 2022-08-19 PROCEDURE — 94799 UNLISTED PULMONARY SVC/PX: CPT

## 2022-08-19 PROCEDURE — 36415 COLL VENOUS BLD VENIPUNCTURE: CPT | Performed by: NEUROLOGICAL SURGERY

## 2022-08-19 PROCEDURE — 80048 BASIC METABOLIC PNL TOTAL CA: CPT | Performed by: NEUROLOGICAL SURGERY

## 2022-08-19 PROCEDURE — 25000003 PHARM REV CODE 250

## 2022-08-19 PROCEDURE — 11000001 HC ACUTE MED/SURG PRIVATE ROOM

## 2022-08-19 PROCEDURE — 63600175 PHARM REV CODE 636 W HCPCS: Performed by: EMERGENCY MEDICINE

## 2022-08-19 PROCEDURE — 85025 COMPLETE CBC W/AUTO DIFF WBC: CPT | Performed by: NEUROLOGICAL SURGERY

## 2022-08-19 RX ORDER — METHOCARBAMOL 100 MG/ML
1000 INJECTION, SOLUTION INTRAMUSCULAR; INTRAVENOUS ONCE
Status: COMPLETED | OUTPATIENT
Start: 2022-08-19 | End: 2022-08-19

## 2022-08-19 RX ADMIN — METHOCARBAMOL 1000 MG: 100 INJECTION, SOLUTION INTRAMUSCULAR; INTRAVENOUS at 08:08

## 2022-08-19 RX ADMIN — SENNOSIDES AND DOCUSATE SODIUM 2 TABLET: 50; 8.6 TABLET ORAL at 08:08

## 2022-08-19 RX ADMIN — HYDROCODONE BITARTRATE AND ACETAMINOPHEN 2 TABLET: 7.5; 325 TABLET ORAL at 04:08

## 2022-08-19 RX ADMIN — ONDANSETRON 4 MG: 2 INJECTION INTRAMUSCULAR; INTRAVENOUS at 12:08

## 2022-08-19 RX ADMIN — FAMOTIDINE 20 MG: 20 TABLET, FILM COATED ORAL at 08:08

## 2022-08-19 RX ADMIN — POLYETHYLENE GLYCOL 3350 17 G: 17 POWDER, FOR SOLUTION ORAL at 08:08

## 2022-08-19 RX ADMIN — SENNOSIDES AND DOCUSATE SODIUM 2 TABLET: 50; 8.6 TABLET ORAL at 09:08

## 2022-08-19 RX ADMIN — HYDROCODONE BITARTRATE AND ACETAMINOPHEN 1 TABLET: 10; 325 TABLET ORAL at 01:08

## 2022-08-19 RX ADMIN — CEFAZOLIN SODIUM 2 G: 2 SOLUTION INTRAVENOUS at 04:08

## 2022-08-19 RX ADMIN — METHOCARBAMOL 750 MG: 750 TABLET ORAL at 08:08

## 2022-08-19 RX ADMIN — HYDROCODONE BITARTRATE AND ACETAMINOPHEN 1 TABLET: 10; 325 TABLET ORAL at 05:08

## 2022-08-19 RX ADMIN — CALCIUM CARBONATE (ANTACID) CHEW TAB 500 MG 500 MG: 500 CHEW TAB at 07:08

## 2022-08-19 RX ADMIN — HYDROCODONE BITARTRATE AND ACETAMINOPHEN 1 TABLET: 10; 325 TABLET ORAL at 08:08

## 2022-08-19 RX ADMIN — METHOCARBAMOL 750 MG: 750 TABLET ORAL at 05:08

## 2022-08-19 RX ADMIN — FAMOTIDINE 20 MG: 20 TABLET, FILM COATED ORAL at 09:08

## 2022-08-19 RX ADMIN — ALUMINUM HYDROXIDE, MAGNESIUM HYDROXIDE, AND SIMETHICONE 30 ML: 200; 200; 20 SUSPENSION ORAL at 07:08

## 2022-08-19 RX ADMIN — HYDROCODONE BITARTRATE AND ACETAMINOPHEN 1 TABLET: 10; 325 TABLET ORAL at 09:08

## 2022-08-19 RX ADMIN — ALUMINUM HYDROXIDE, MAGNESIUM HYDROXIDE, AND SIMETHICONE 30 ML: 200; 200; 20 SUSPENSION ORAL at 03:08

## 2022-08-19 RX ADMIN — HYDROMORPHONE HYDROCHLORIDE 1 MG: 2 INJECTION, SOLUTION INTRAMUSCULAR; INTRAVENOUS; SUBCUTANEOUS at 12:08

## 2022-08-19 NOTE — PLAN OF CARE
"Initial dc assessment done with pt and . Pt states had sx on Monday with Dr Aquino and went home same day. She started to have pt that evening that progressed during week and needing to  use her cane and  to help with ADL's last few days. Prior to surgery on Monday pt states she was indep in ADL's.  Pt states she is still having pain and spasms in b/l legs when standing this am. She states she does not feel safe going home and would like to go to in rehab. Discussed rehabs in area and they would like to go to [facility] rehab to "stay in the system and close to home".  Referral sent to [facility] rehab via [facility].  "

## 2022-08-19 NOTE — PT/OT/SLP PROGRESS
Physical Therapy         Treatment        Marina Dixon   MRN: [id number]     PT Received On: 08/19/22  PT Start Time: 0923     PT Stop Time: 0950    PT Total Time (min): 27 min       Billable Minutes:  Gait [id number]  Total Minutes: 27    Treatment Type: Treatment  PT/PTA: PTA     PTA Visit Number: 1       General Precautions: Standard, fall  Orthopedic Precautions: Orthopedic Precautions : N/A   Braces:           Subjective:  Communicated with NSG prior to session.    Pain/Comfort  Location - Side 1: Bilateral  Location - Orientation 1: posterior  Location 1: leg  Pain Addressed 1: Reposition, Distraction    Objective:  Patient found in bed with HOB elevated, with Patient found with: peripheral IV    Functional Mobility:  Bed Mobility:   Supine to sit: Minimal Assistance   Sit to supine: Activity did not occur   Rolling: Activity did not occur   Scooting: Minimal Assistance    Balance:   Static Sit: GOOD+: Takes MAXIMAL challenges from all directions.    Dynamic Sit:  GOOD+: Maintains balance through MAXIMAL excursions of active trunk motion  Static Stand: FAIR+: Takes MINIMAL challenges from all directions  Dynamic stand: POOR+: Needs MIN (minimal ) assist during gait    Transfer Training:  Sit to stand:Minimal Assistance with Rolling Walker .    Gait Training:  Pt amb 4ft and 18ft with long seated rest between trials. Pt unsteady for both trials but had no major LOB.       Additional Treatment:    Activity Tolerance:  Patient tolerated treatment well    Patient left up in chair with all lines intact and call button in reach.    Assessment:  Marina Dixon is a 62 y.o. female with a medical diagnosis of Lumbar radiculopathy. She presents with decreased activity tolerance and safety awareness. Pt if a fall risk at this time.   Time spent educating pt and her  on placement options vs d/cing home from hospital. Both very receptive. CM to speak with both of them later to discuss options.   Pt would benefit  from further rehab therapy services to increase overall independence level and assist in getting pt closer to PLOF. Pt also has 2 steps to enter home and multiple obstacles to maneuver around per pt .       Rehab potential is excellent.    Activity tolerance: Excellent    Discharge recommendations: Discharge Facility/Level of Care Needs: rehabilitation facility, home with home health (pending progress with PT while here in hospital)     Equipment recommendations: Equipment Needed After Discharge: walker, rolling     GOALS:   Multidisciplinary Problems     Physical Therapy Goals        Problem: Physical Therapy    Goal Priority Disciplines Outcome Goal Variances Interventions   Physical Therapy Goal     PT, PT/OT Ongoing, Progressing     Description: Goals to be met by: 22    Patient will increase functional independence with mobility by performin. Supine to sit with Modified [place]  2. Sit to stand transfer with Modified [place]  3. Gait  x 200 feet with Modified [place] using Rolling Walker vs LRAD.                      PLAN:    Patient to be seen daily  to address the above listed problems via gait training, therapeutic activities, therapeutic exercises  Plan of Care expires: 22  Plan of Care reviewed with: patient         2022

## 2022-08-20 PROCEDURE — 63600175 PHARM REV CODE 636 W HCPCS: Performed by: EMERGENCY MEDICINE

## 2022-08-20 PROCEDURE — 63600175 PHARM REV CODE 636 W HCPCS: Performed by: NEUROLOGICAL SURGERY

## 2022-08-20 PROCEDURE — 25000242 PHARM REV CODE 250 ALT 637 W/ HCPCS: Performed by: PHYSICIAN ASSISTANT

## 2022-08-20 PROCEDURE — 25000003 PHARM REV CODE 250: Performed by: PHYSICIAN ASSISTANT

## 2022-08-20 PROCEDURE — 25000003 PHARM REV CODE 250: Performed by: EMERGENCY MEDICINE

## 2022-08-20 PROCEDURE — 97530 THERAPEUTIC ACTIVITIES: CPT | Mod: CQ

## 2022-08-20 PROCEDURE — 25000003 PHARM REV CODE 250

## 2022-08-20 PROCEDURE — 25000003 PHARM REV CODE 250: Performed by: NEUROLOGICAL SURGERY

## 2022-08-20 PROCEDURE — 94761 N-INVAS EAR/PLS OXIMETRY MLT: CPT

## 2022-08-20 PROCEDURE — 11000001 HC ACUTE MED/SURG PRIVATE ROOM

## 2022-08-20 PROCEDURE — 63600175 PHARM REV CODE 636 W HCPCS: Performed by: PHYSICIAN ASSISTANT

## 2022-08-20 RX ORDER — DOCUSATE SODIUM 100 MG/1
100 CAPSULE, LIQUID FILLED ORAL 2 TIMES DAILY PRN
Status: DISCONTINUED | OUTPATIENT
Start: 2022-08-20 | End: 2022-08-22 | Stop reason: HOSPADM

## 2022-08-20 RX ORDER — TRIAMTERENE AND HYDROCHLOROTHIAZIDE 37.5; 25 MG/1; MG/1
1 CAPSULE ORAL DAILY
Status: DISCONTINUED | OUTPATIENT
Start: 2022-08-20 | End: 2022-08-22 | Stop reason: HOSPADM

## 2022-08-20 RX ORDER — PANTOPRAZOLE SODIUM 40 MG/1
40 TABLET, DELAYED RELEASE ORAL DAILY
Status: DISCONTINUED | OUTPATIENT
Start: 2022-08-20 | End: 2022-08-22 | Stop reason: HOSPADM

## 2022-08-20 RX ORDER — GABAPENTIN 300 MG/1
600 CAPSULE ORAL 3 TIMES DAILY
Status: DISCONTINUED | OUTPATIENT
Start: 2022-08-20 | End: 2022-08-22 | Stop reason: HOSPADM

## 2022-08-20 RX ORDER — DIAZEPAM 5 MG/1
10 TABLET ORAL EVERY 6 HOURS PRN
Status: DISCONTINUED | OUTPATIENT
Start: 2022-08-20 | End: 2022-08-22 | Stop reason: HOSPADM

## 2022-08-20 RX ADMIN — DIAZEPAM 10 MG: 5 TABLET ORAL at 01:08

## 2022-08-20 RX ADMIN — HYDROMORPHONE HYDROCHLORIDE 1 MG: 2 INJECTION, SOLUTION INTRAMUSCULAR; INTRAVENOUS; SUBCUTANEOUS at 03:08

## 2022-08-20 RX ADMIN — PANTOPRAZOLE SODIUM 40 MG: 40 TABLET, DELAYED RELEASE ORAL at 01:08

## 2022-08-20 RX ADMIN — DEXAMETHASONE 6 MG: 2 TABLET ORAL at 06:08

## 2022-08-20 RX ADMIN — HYDROMORPHONE HYDROCHLORIDE 1 MG: 2 INJECTION, SOLUTION INTRAMUSCULAR; INTRAVENOUS; SUBCUTANEOUS at 08:08

## 2022-08-20 RX ADMIN — HYDROMORPHONE HYDROCHLORIDE 1 MG: 2 INJECTION, SOLUTION INTRAMUSCULAR; INTRAVENOUS; SUBCUTANEOUS at 11:08

## 2022-08-20 RX ADMIN — DEXAMETHASONE 6 MG: 2 TABLET ORAL at 11:08

## 2022-08-20 RX ADMIN — POLYETHYLENE GLYCOL 3350 17 G: 17 POWDER, FOR SOLUTION ORAL at 08:08

## 2022-08-20 RX ADMIN — TRIAMTERENE AND HYDROCHLOROTHIAZIDE 1 CAPSULE: 37.5; 25 CAPSULE ORAL at 01:08

## 2022-08-20 RX ADMIN — HYDROMORPHONE HYDROCHLORIDE 1 MG: 2 INJECTION, SOLUTION INTRAMUSCULAR; INTRAVENOUS; SUBCUTANEOUS at 05:08

## 2022-08-20 RX ADMIN — SENNOSIDES AND DOCUSATE SODIUM 2 TABLET: 50; 8.6 TABLET ORAL at 08:08

## 2022-08-20 RX ADMIN — HYDROCODONE BITARTRATE AND ACETAMINOPHEN 1 TABLET: 10; 325 TABLET ORAL at 08:08

## 2022-08-20 RX ADMIN — HYDROCODONE BITARTRATE AND ACETAMINOPHEN 1 TABLET: 10; 325 TABLET ORAL at 06:08

## 2022-08-20 RX ADMIN — HYDROCODONE BITARTRATE AND ACETAMINOPHEN 1 TABLET: 10; 325 TABLET ORAL at 02:08

## 2022-08-20 RX ADMIN — METHOCARBAMOL 750 MG: 750 TABLET ORAL at 08:08

## 2022-08-20 RX ADMIN — GABAPENTIN 600 MG: 300 CAPSULE ORAL at 08:08

## 2022-08-20 RX ADMIN — DIAZEPAM 10 MG: 5 TABLET ORAL at 08:08

## 2022-08-20 RX ADMIN — HYDROMORPHONE HYDROCHLORIDE 1 MG: 2 INJECTION, SOLUTION INTRAMUSCULAR; INTRAVENOUS; SUBCUTANEOUS at 12:08

## 2022-08-20 RX ADMIN — HYDROCODONE BITARTRATE AND ACETAMINOPHEN 1 TABLET: 10; 325 TABLET ORAL at 11:08

## 2022-08-20 RX ADMIN — GABAPENTIN 600 MG: 300 CAPSULE ORAL at 02:08

## 2022-08-20 RX ADMIN — FAMOTIDINE 20 MG: 20 TABLET, FILM COATED ORAL at 08:08

## 2022-08-20 RX ADMIN — ONDANSETRON 4 MG: 2 INJECTION INTRAMUSCULAR; INTRAVENOUS at 12:08

## 2022-08-20 NOTE — PROGRESS NOTES
POD#2 revision of right L2-3, L3-4 MIS  She is sitting up in her chair, NAD  She continues with c/o spasms in the low back and right buttock  She continues with pain down bilateral thighs  She has not been able to walk much d/t her pain  She denies bladder and bowel dysfunction    AFVSS  Motor intact bilateral LE  Incision c/d/i  VERA site dry    Plan: I will switch her muscle relaxer to valium  I will start her on decadron  Continue daily dressing changes and prn  Gabapentin TID  Continue PT/OT  SCDs for DVT prophylaxis

## 2022-08-20 NOTE — PLAN OF CARE
Problem: Adult Inpatient Plan of Care  Goal: Plan of Care Review  Outcome: Ongoing, Progressing  Goal: Patient-Specific Goal (Individualized)  Outcome: Ongoing, Progressing  Goal: Absence of Hospital-Acquired Illness or Injury  Outcome: Ongoing, Progressing  Goal: Optimal Comfort and Wellbeing  Outcome: Ongoing, Progressing  Goal: Readiness for Transition of Care  Outcome: Ongoing, Progressing     Problem: Bariatric Environmental Safety  Goal: Safety Maintained with Care  Outcome: Ongoing, Progressing     Problem: Infection  Goal: Absence of Infection Signs and Symptoms  Outcome: Ongoing, Progressing     Problem: Fall Injury Risk  Goal: Absence of Fall and Fall-Related Injury  Outcome: Ongoing, Progressing     Problem: Pain Acute  Goal: Acceptable Pain Control and Functional Ability  Outcome: Ongoing, Progressing

## 2022-08-20 NOTE — PT/OT/SLP PROGRESS
Physical Therapy Treatment    Patient Name:  Marina Dixon   MRN:  [id number]    Recommendations:     Discharge Recommendations:   (Rehab versus HH)   Discharge Equipment Recommendations: walker, rolling   Barriers to discharge:      Assessment:     Marina Dixon is a 62 y.o. female admitted with a medical diagnosis of Lumbar radiculopathy.  She presents with the following impairments/functional limitations:  weakness, impaired endurance, gait instability.    Rehab Prognosis: Good; patient would benefit from acute skilled PT services to address these deficits and reach maximum level of function.    Recent Surgery: Procedure(s) (LRB):  LAMINECTOMY, SPINE, LUMBAR (N/A) 2 Days Post-Op    Plan:     During this hospitalization, patient to be seen daily to address the identified rehab impairments via gait training, therapeutic activities, therapeutic exercises and progress toward the following goals:    · Plan of Care Expires:  09/08/22    Subjective     Chief Complaint: Pt c/o increase spasms limiting pt mobility.   Patient/Family Comments/goals:  Pain/Comfort:  · Pain Rating 1:  (Pt c/o increase spasm in buttocks and back of legs.)  · Pain Addressed 1:  (Ice applied to buttocks per pt request.)      Objective:     Communicated with NSG prior to session.  Patient found up in chair upon PTA entry to room.     General Precautions: Standard, fall   Orthopedic Precautions:spinal precautions   Braces: LSO  Respiratory Status: Room air     Functional Mobility:  · LSO donned, also educated pt on spinal pxn. Pt sitting UIC without LSO brace upon PTA arrival.  · T/F: [place]ar[place]za sit <-> stand from recliner  · Gait: Pt amb for approximately 20 ft step through gait pattern with very slow rony. Limited by pain. Pt motivated and attempted to ambulate for a second time but was not able to continue 2/2 increase spasms.      Patient left up in chair with all lines intact, call button in reach and  present..    GOALS:    Multidisciplinary Problems     Physical Therapy Goals        Problem: Physical Therapy    Goal Priority Disciplines Outcome Goal Variances Interventions   Physical Therapy Goal     PT, PT/OT Ongoing, Progressing     Description: Goals to be met by: 22    Patient will increase functional independence with mobility by performin. Supine to sit with Modified [place]  2. Sit to stand transfer with Modified [place]  3. Gait  x 200 feet with Modified [place] using Rolling Walker vs LRAD.                      Time Tracking:     PT Received On:    PT Start Time: 1243     PT Stop Time: 1306  PT Total Time (min): 23 min     Billable Minutes: Therapeutic Activity 23    Treatment Type: Treatment  PT/PTA: PTA     PTA Visit Number: 2     2022

## 2022-08-21 PROCEDURE — 11000001 HC ACUTE MED/SURG PRIVATE ROOM

## 2022-08-21 PROCEDURE — 25000003 PHARM REV CODE 250: Performed by: PHYSICIAN ASSISTANT

## 2022-08-21 PROCEDURE — 97166 OT EVAL MOD COMPLEX 45 MIN: CPT

## 2022-08-21 PROCEDURE — 63600175 PHARM REV CODE 636 W HCPCS: Performed by: ANESTHESIOLOGY

## 2022-08-21 PROCEDURE — 25000003 PHARM REV CODE 250: Performed by: NEUROLOGICAL SURGERY

## 2022-08-21 PROCEDURE — 63600175 PHARM REV CODE 636 W HCPCS: Performed by: PHYSICIAN ASSISTANT

## 2022-08-21 PROCEDURE — 63600175 PHARM REV CODE 636 W HCPCS: Performed by: NEUROLOGICAL SURGERY

## 2022-08-21 RX ORDER — ENOXAPARIN SODIUM 100 MG/ML
40 INJECTION SUBCUTANEOUS EVERY 24 HOURS
Status: DISCONTINUED | OUTPATIENT
Start: 2022-08-21 | End: 2022-08-22 | Stop reason: HOSPADM

## 2022-08-21 RX ADMIN — DEXAMETHASONE 6 MG: 2 TABLET ORAL at 06:08

## 2022-08-21 RX ADMIN — ENOXAPARIN SODIUM 40 MG: 40 INJECTION SUBCUTANEOUS at 04:08

## 2022-08-21 RX ADMIN — HYDROMORPHONE HYDROCHLORIDE 1 MG: 2 INJECTION, SOLUTION INTRAMUSCULAR; INTRAVENOUS; SUBCUTANEOUS at 04:08

## 2022-08-21 RX ADMIN — HYDROCODONE BITARTRATE AND ACETAMINOPHEN 1 TABLET: 10; 325 TABLET ORAL at 08:08

## 2022-08-21 RX ADMIN — DIAZEPAM 10 MG: 5 TABLET ORAL at 07:08

## 2022-08-21 RX ADMIN — HYDROCODONE BITARTRATE AND ACETAMINOPHEN 1 TABLET: 10; 325 TABLET ORAL at 02:08

## 2022-08-21 RX ADMIN — HYDROCODONE BITARTRATE AND ACETAMINOPHEN 2 TABLET: 7.5; 325 TABLET ORAL at 10:08

## 2022-08-21 RX ADMIN — GABAPENTIN 600 MG: 300 CAPSULE ORAL at 08:08

## 2022-08-21 RX ADMIN — TRIAMTERENE AND HYDROCHLOROTHIAZIDE 1 CAPSULE: 37.5; 25 CAPSULE ORAL at 03:08

## 2022-08-21 RX ADMIN — POLYETHYLENE GLYCOL 3350 17 G: 17 POWDER, FOR SOLUTION ORAL at 09:08

## 2022-08-21 RX ADMIN — DIAZEPAM 10 MG: 5 TABLET ORAL at 06:08

## 2022-08-21 RX ADMIN — HYDROMORPHONE HYDROCHLORIDE 1 MG: 2 INJECTION, SOLUTION INTRAMUSCULAR; INTRAVENOUS; SUBCUTANEOUS at 12:08

## 2022-08-21 RX ADMIN — PANTOPRAZOLE SODIUM 40 MG: 40 TABLET, DELAYED RELEASE ORAL at 09:08

## 2022-08-21 RX ADMIN — DEXAMETHASONE 6 MG: 2 TABLET ORAL at 01:08

## 2022-08-21 RX ADMIN — SENNOSIDES AND DOCUSATE SODIUM 2 TABLET: 50; 8.6 TABLET ORAL at 09:08

## 2022-08-21 RX ADMIN — HYDROMORPHONE HYDROCHLORIDE 0.4 MG: 2 INJECTION, SOLUTION INTRAMUSCULAR; INTRAVENOUS; SUBCUTANEOUS at 04:08

## 2022-08-21 RX ADMIN — DIAZEPAM 10 MG: 5 TABLET ORAL at 12:08

## 2022-08-21 RX ADMIN — HYDROCODONE BITARTRATE AND ACETAMINOPHEN 1 TABLET: 10; 325 TABLET ORAL at 06:08

## 2022-08-21 RX ADMIN — GABAPENTIN 600 MG: 300 CAPSULE ORAL at 02:08

## 2022-08-21 RX ADMIN — GABAPENTIN 600 MG: 300 CAPSULE ORAL at 09:08

## 2022-08-21 RX ADMIN — DEXAMETHASONE 6 MG: 2 TABLET ORAL at 05:08

## 2022-08-21 NOTE — PLAN OF CARE
Problem: Occupational Therapy  Goal: Occupational Therapy Goal  Description: Goals to be met by: 9/19/22     Patient will increase functional independence with ADLs by performing:    UE Dressing with Modified [place].  LE Dressing with Modified [place].  Grooming while standing with Modified [place].  Toileting from toilet with Modified [place] for hygiene and clothing management.   Toilet transfer to toilet with Modified [place].    Outcome: Ongoing, Progressing

## 2022-08-21 NOTE — PT/OT/SLP EVAL
Occupational Therapy   Evaluation    Name: Marina Dixon  MRN: [id number]  Admitting Diagnosis:  Lumbar radiculopathy  Recent Surgery: Procedure(s) (LRB):  LAMINECTOMY, SPINE, LUMBAR (N/A) 3 Days Post-Op    Recommendations:     Discharge Recommendations:  (rehab vs HH)  Discharge Equipment Recommendations:  walker, rolling  Barriers to discharge:  None    Assessment:     Marina Dixon is a 62 y.o. female with a medical diagnosis of Lumbar radiculopathy.  She presents with improvement in pain and good effort with activity. Performance deficits affecting function: weakness, impaired endurance, impaired self care skills, impaired functional mobility, pain.      Rehab Prognosis: Good; patient would benefit from acute skilled OT services to address these deficits and reach maximum level of function.       Plan:     Patient to be seen 5 x/week to address the above listed problems via self-care/home management, therapeutic activities, therapeutic exercises  · Plan of Care Expires: 09/19/22  · Plan of Care Reviewed with: patient    Subjective     Chief Complaint: spasms at times in back and legs  Patient/Family Comments/goals: return to home and be independent again     Occupational Profile:  Living Environment: lives in  home with 2 steps with   Previous level of function: independent  Roles and Routines: wife  Equipment Used at Home:  CPAP, cane, straight  Assistance upon Discharge: some, from     Pain/Comfort:  · Pain Rating 1: 0/10  · Pain Addressed 1: Reposition, Distraction, Pre-medicate for activity    Patients cultural, spiritual, Yazidi conflicts given the current situation:      Objective:     Communicated with: nsdebbie prior to session.  Patient found up in chair with peripheral IV upon OT entry to room.    General Precautions: Standard, fall   Orthopedic Precautions:spinal precautions   Braces: LSO  Respiratory Status: Room air    Occupational Performance:    Bed Mobility:    ·     Functional  Mobility/Transfers:  · Patient completed Sit <> Stand Transfer with stand by assistance  with  rolling walker   · Functional Mobility: able to ambulate from chair to bed with CGA/SBA with RW  · Ambulating to BR with SBA to min assist with RW  · Per pt: shower with min assist and min assist for transfer with CNA    Activities of Daily Living:  · Able to christa/doff socks seated EOB with figure 4 and mod I  · Donning LSO mod I seated    Cognitive/Visual Perceptual:  wfl    Physical Exam:  wfl BUE's     AMPAC 6 Click ADL:  AMPA Total Score:      Treatment & Education:  POC, safety with mobility, rec's, role and goals of OT, eventual f/u with outpt PT when ok per MD.  Education:    Patient left sitting edge of bed with nsg present    GOALS:   Multidisciplinary Problems     Occupational Therapy Goals        Problem: Occupational Therapy    Goal Priority Disciplines Outcome Interventions   Occupational Therapy Goal     OT, PT/OT Ongoing, Progressing    Description: Goals to be met by: 9/19/22     Patient will increase functional independence with ADLs by performing:    UE Dressing with Modified [place].  LE Dressing with Modified [place].  Grooming while standing with Modified [place].  Toileting from toilet with Modified [place] for hygiene and clothing management.   Toilet transfer to toilet with Modified [place].                     History:     Past Medical History:   Diagnosis Date    Arthritis     Endometriosis of uterus 2002    GERD (gastroesophageal reflux disease)     Hypertension 2020    Menopause 2002    Hysterectomy    NINA (obstructive sleep apnea)     wears cpap    Sciatica        Past Surgical History:   Procedure Laterality Date    BREAST BIOPSY Right 6/27/2022    Procedure: BIOPSY, BREAST, WITH LUMPECTOMY / Right Major duct excision;  Surgeon: Sintia Red MD;  Location: [facility];  Service: General;  Laterality: Right;    CATARACT EXTRACTION W/ INTRAOCULAR LENS   IMPLANT, BILATERAL      HYSTERECTOMY  2002    LUMBAR LAMINECTOMY N/A 8/18/2022    Procedure: LAMINECTOMY, SPINE, LUMBAR;  Surgeon: Osvaldo Aquino MD;  Location[facility];  Service: Neurosurgery;  Laterality: N/A;  open L2/3, L3/4 laminectomy  drainage of fluid    SKIN BIOPSY Right     breast       Time Tracking:     OT Date of Treatment: 08/21/22  OT Start Time: 1158  OT Stop Time: 1230  OT Total Time (min): 32 min    Billable Minutes:Evaluation 32 min    8/21/2022

## 2022-08-21 NOTE — PROGRESS NOTES
POD#3 revision of right L2-3, L3-4 MIS  She is sitting up in her chair, NAD  She continues with c/o spasms in the low back and buttocks  She continues with pain down bilateral thighs, improved from yesterday  She is walking around the room better today. She had a much better night last night and was able to sleep. Valium is helping with her spasms.  She denies bladder and bowel dysfunction    AFVSS  Motor intact bilateral LE  Incision c/d/i  VERA site dry    Plan: Continue current pain medication regimen  Continue decadron  Continue daily dressing changes and prn  Gabapentin TID  Continue PT/OT  SCDs and lovenox for DVT prophylaxis

## 2022-08-22 ENCOUNTER — HOSPITAL ENCOUNTER (INPATIENT)
Facility: HOSPITAL | Age: 62
LOS: 8 days | Discharge: HOME | DRG: 949 | End: 2022-08-30
Attending: INTERNAL MEDICINE | Admitting: INTERNAL MEDICINE
Payer: COMMERCIAL

## 2022-08-22 VITALS
HEIGHT: 64 IN | OXYGEN SATURATION: 98 % | WEIGHT: 235 LBS | DIASTOLIC BLOOD PRESSURE: 75 MMHG | SYSTOLIC BLOOD PRESSURE: 132 MMHG | BODY MASS INDEX: 40.12 KG/M2 | RESPIRATION RATE: 18 BRPM | TEMPERATURE: 98 F | HEART RATE: 77 BPM

## 2022-08-22 DIAGNOSIS — M54.16 LUMBAR RADICULOPATHY: ICD-10-CM

## 2022-08-22 LAB — SARS-COV-2 RDRP RESP QL NAA+PROBE: NEGATIVE

## 2022-08-22 PROCEDURE — 25000003 PHARM REV CODE 250: Performed by: PHYSICIAN ASSISTANT

## 2022-08-22 PROCEDURE — 25000003 PHARM REV CODE 250: Performed by: NURSE PRACTITIONER

## 2022-08-22 PROCEDURE — 87635 SARS-COV-2 COVID-19 AMP PRB: CPT | Performed by: NEUROLOGICAL SURGERY

## 2022-08-22 PROCEDURE — 25000242 PHARM REV CODE 250 ALT 637 W/ HCPCS: Performed by: PHYSICIAN ASSISTANT

## 2022-08-22 PROCEDURE — 11800000 HC REHAB PRIVATE ROOM

## 2022-08-22 PROCEDURE — 63600175 PHARM REV CODE 636 W HCPCS: Performed by: NURSE PRACTITIONER

## 2022-08-22 PROCEDURE — 94799 UNLISTED PULMONARY SVC/PX: CPT

## 2022-08-22 PROCEDURE — 25000003 PHARM REV CODE 250: Performed by: NEUROLOGICAL SURGERY

## 2022-08-22 PROCEDURE — 97116 GAIT TRAINING THERAPY: CPT | Mod: CQ

## 2022-08-22 PROCEDURE — 99900031 HC PATIENT EDUCATION (STAT)

## 2022-08-22 PROCEDURE — 97530 THERAPEUTIC ACTIVITIES: CPT | Mod: CQ

## 2022-08-22 PROCEDURE — 63600175 PHARM REV CODE 636 W HCPCS: Performed by: PHYSICIAN ASSISTANT

## 2022-08-22 RX ORDER — DOCUSATE SODIUM 100 MG/1
100 CAPSULE, LIQUID FILLED ORAL 2 TIMES DAILY
Status: DISCONTINUED | OUTPATIENT
Start: 2022-08-22 | End: 2022-08-23

## 2022-08-22 RX ORDER — LABETALOL HCL 20 MG/4 ML
10 SYRINGE (ML) INTRAVENOUS EVERY 4 HOURS PRN
Status: DISCONTINUED | OUTPATIENT
Start: 2022-08-22 | End: 2022-08-30 | Stop reason: HOSPADM

## 2022-08-22 RX ORDER — METOPROLOL TARTRATE 1 MG/ML
10 INJECTION, SOLUTION INTRAVENOUS
Status: DISCONTINUED | OUTPATIENT
Start: 2022-08-22 | End: 2022-08-30 | Stop reason: HOSPADM

## 2022-08-22 RX ORDER — POLYETHYLENE GLYCOL 3350 17 G/17G
17 POWDER, FOR SOLUTION ORAL DAILY
Status: DISCONTINUED | OUTPATIENT
Start: 2022-08-23 | End: 2022-08-24

## 2022-08-22 RX ORDER — ACETAMINOPHEN 325 MG/1
650 TABLET ORAL EVERY 4 HOURS PRN
Status: DISCONTINUED | OUTPATIENT
Start: 2022-08-22 | End: 2022-08-30 | Stop reason: HOSPADM

## 2022-08-22 RX ORDER — NITROGLYCERIN 0.4 MG/1
0.4 TABLET SUBLINGUAL EVERY 5 MIN PRN
Status: DISCONTINUED | OUTPATIENT
Start: 2022-08-22 | End: 2022-08-30 | Stop reason: HOSPADM

## 2022-08-22 RX ORDER — DULOXETIN HYDROCHLORIDE 30 MG/1
60 CAPSULE, DELAYED RELEASE ORAL DAILY
Status: DISCONTINUED | OUTPATIENT
Start: 2022-08-23 | End: 2022-08-30 | Stop reason: HOSPADM

## 2022-08-22 RX ORDER — PROMETHAZINE HYDROCHLORIDE 25 MG/1
25 TABLET ORAL EVERY 6 HOURS PRN
Status: DISCONTINUED | OUTPATIENT
Start: 2022-08-22 | End: 2022-08-30 | Stop reason: HOSPADM

## 2022-08-22 RX ORDER — HYDROXYZINE PAMOATE 50 MG/1
50 CAPSULE ORAL NIGHTLY PRN
Status: DISCONTINUED | OUTPATIENT
Start: 2022-08-22 | End: 2022-08-30 | Stop reason: HOSPADM

## 2022-08-22 RX ORDER — CHOLECALCIFEROL (VITAMIN D3) 25 MCG
5000 TABLET ORAL DAILY
Status: DISCONTINUED | OUTPATIENT
Start: 2022-08-23 | End: 2022-08-30 | Stop reason: HOSPADM

## 2022-08-22 RX ORDER — ONDANSETRON 4 MG/1
4 TABLET, ORALLY DISINTEGRATING ORAL EVERY 6 HOURS PRN
Status: DISCONTINUED | OUTPATIENT
Start: 2022-08-22 | End: 2022-08-30 | Stop reason: HOSPADM

## 2022-08-22 RX ORDER — METHOCARBAMOL 500 MG/1
500 TABLET, FILM COATED ORAL 3 TIMES DAILY
Status: DISCONTINUED | OUTPATIENT
Start: 2022-08-22 | End: 2022-08-28

## 2022-08-22 RX ORDER — BENZONATATE 100 MG/1
100 CAPSULE ORAL 3 TIMES DAILY PRN
Status: DISCONTINUED | OUTPATIENT
Start: 2022-08-22 | End: 2022-08-30 | Stop reason: HOSPADM

## 2022-08-22 RX ORDER — ALPRAZOLAM 0.25 MG/1
0.25 TABLET ORAL 3 TIMES DAILY PRN
Status: DISCONTINUED | OUTPATIENT
Start: 2022-08-22 | End: 2022-08-30 | Stop reason: HOSPADM

## 2022-08-22 RX ORDER — TRIAMTERENE/HYDROCHLOROTHIAZID 37.5-25 MG
1 TABLET ORAL DAILY
Status: DISCONTINUED | OUTPATIENT
Start: 2022-08-23 | End: 2022-08-30 | Stop reason: HOSPADM

## 2022-08-22 RX ORDER — HYDROCODONE BITARTRATE AND ACETAMINOPHEN 10; 325 MG/1; MG/1
1 TABLET ORAL EVERY 6 HOURS PRN
Status: DISCONTINUED | OUTPATIENT
Start: 2022-08-22 | End: 2022-08-30 | Stop reason: HOSPADM

## 2022-08-22 RX ORDER — HYDRALAZINE HYDROCHLORIDE 20 MG/ML
10 INJECTION INTRAMUSCULAR; INTRAVENOUS EVERY 4 HOURS PRN
Status: DISCONTINUED | OUTPATIENT
Start: 2022-08-22 | End: 2022-08-30 | Stop reason: HOSPADM

## 2022-08-22 RX ORDER — BISACODYL 10 MG
10 SUPPOSITORY, RECTAL RECTAL DAILY PRN
Status: DISCONTINUED | OUTPATIENT
Start: 2022-08-22 | End: 2022-08-30 | Stop reason: HOSPADM

## 2022-08-22 RX ORDER — ONDANSETRON 4 MG/1
8 TABLET, ORALLY DISINTEGRATING ORAL EVERY 6 HOURS PRN
Status: DISCONTINUED | OUTPATIENT
Start: 2022-08-22 | End: 2022-08-30 | Stop reason: HOSPADM

## 2022-08-22 RX ORDER — PANTOPRAZOLE SODIUM 40 MG/1
40 TABLET, DELAYED RELEASE ORAL DAILY
Status: DISCONTINUED | OUTPATIENT
Start: 2022-08-23 | End: 2022-08-30 | Stop reason: HOSPADM

## 2022-08-22 RX ORDER — GABAPENTIN 300 MG/1
600 CAPSULE ORAL 3 TIMES DAILY
Status: DISCONTINUED | OUTPATIENT
Start: 2022-08-22 | End: 2022-08-30 | Stop reason: HOSPADM

## 2022-08-22 RX ADMIN — DEXAMETHASONE 6 MG: 2 TABLET ORAL at 05:08

## 2022-08-22 RX ADMIN — DEXAMETHASONE 6 MG: 2 TABLET ORAL at 01:08

## 2022-08-22 RX ADMIN — TRIAMTERENE AND HYDROCHLOROTHIAZIDE 1 CAPSULE: 37.5; 25 CAPSULE ORAL at 09:08

## 2022-08-22 RX ADMIN — HYDROCODONE BITARTRATE AND ACETAMINOPHEN 1 TABLET: 10; 325 TABLET ORAL at 08:08

## 2022-08-22 RX ADMIN — DEXAMETHASONE 2 MG: 2 TABLET ORAL at 11:08

## 2022-08-22 RX ADMIN — DEXAMETHASONE 6 MG: 0.5 TABLET ORAL at 06:08

## 2022-08-22 RX ADMIN — GABAPENTIN 600 MG: 300 CAPSULE ORAL at 02:08

## 2022-08-22 RX ADMIN — HYDROCODONE BITARTRATE AND ACETAMINOPHEN 1 TABLET: 10; 325 TABLET ORAL at 05:08

## 2022-08-22 RX ADMIN — DIAZEPAM 10 MG: 5 TABLET ORAL at 01:08

## 2022-08-22 RX ADMIN — DEXAMETHASONE 6 MG: 0.5 TABLET ORAL at 11:08

## 2022-08-22 RX ADMIN — PANTOPRAZOLE SODIUM 40 MG: 40 TABLET, DELAYED RELEASE ORAL at 08:08

## 2022-08-22 RX ADMIN — HYDROCODONE BITARTRATE AND ACETAMINOPHEN 1 TABLET: 10; 325 TABLET ORAL at 01:08

## 2022-08-22 RX ADMIN — GABAPENTIN 600 MG: 300 CAPSULE ORAL at 11:08

## 2022-08-22 RX ADMIN — HYDROCODONE BITARTRATE AND ACETAMINOPHEN 1 TABLET: 10; 325 TABLET ORAL at 02:08

## 2022-08-22 RX ADMIN — DIAZEPAM 10 MG: 5 TABLET ORAL at 08:08

## 2022-08-22 RX ADMIN — GABAPENTIN 600 MG: 300 CAPSULE ORAL at 08:08

## 2022-08-22 RX ADMIN — DOCUSATE SODIUM 100 MG: 100 CAPSULE, LIQUID FILLED ORAL at 09:08

## 2022-08-22 RX ADMIN — METHOCARBAMOL 500 MG: 500 TABLET ORAL at 11:08

## 2022-08-22 NOTE — PT/OT/SLP PROGRESS
Physical Therapy Treatment    Patient Name:  Marina Dixon   MRN:  [id number]    Recommendations:     Discharge Recommendations:  home health PT, rehabilitation facility   Discharge Equipment Recommendations: walker, rolling     Subjective     Patient oriented 3.     Objective:     General Precautions: Standard, fall   Orthopedic Precautions:spinal precautions   Braces:    Respiratory Status: Room air     Communicated with nurse prior to treatment.     Functional Mobility:    Rolling:Stand-by Assistance  Supine to sit:Minimal Assistance  Sit to stand transfer: Stand-by Assistance  Bed to chair transfer:Stand-by Assistance  Gait 140 ft and 100 ft with RW min assist due to LOB. 4 step bilateral rails with min assist.       AM-PAC 6 CLICK MOBILITY  Turning over in bed (including adjusting bedclothes, sheets and blankets)?: 3  Sitting down on and standing up from a chair with arms (e.g., wheelchair, bedside commode, etc.): 4  Moving from lying on back to sitting on the side of the bed?: 3  Moving to and from a bed to a chair (including a wheelchair)?: 4  Need to walk in hospital room?: 3  Climbing 3-5 steps with a railing?: 3  Basic Mobility Total Score: 20     Patient left up in chair with call button in reach..    GOALS:   Multidisciplinary Problems     Physical Therapy Goals        Problem: Physical Therapy    Goal Priority Disciplines Outcome Goal Variances Interventions   Physical Therapy Goal     PT, PT/OT Ongoing, Progressing     Description: Goals to be met by: 22    Patient will increase functional independence with mobility by performin. Supine to sit with Modified [place]  2. Sit to stand transfer with Modified [place]  3. Gait  x 200 feet with Modified [place] using Rolling Walker vs LRAD.                      Assessment:     Marina Dixon is a 62 y.o. female admitted with a medical diagnosis of Lumbar radiculopathy.     Rehab Prognosis: Good; patient would benefit from acute  skilled PT services to address these deficits and reach maximum level of function.    Recent Surgery: Procedure(s) (LRB):  LAMINECTOMY, SPINE, LUMBAR (N/A) 4 Days Post-Op    Plan:     During this hospitalization, patient to be seen daily to address the identified rehab impairments via gait training, therapeutic activities, therapeutic exercises and progress toward the following goals:    · Plan of Care Expires:  09/08/22    Billable Minutes     Billable Minutes: Gait Training 12 and Therapeutic Activity 13    Treatment Type: Treatment  PT/PTA: PTA     PTA Visit Number: 3     08/22/2022

## 2022-08-22 NOTE — PROGRESS NOTES
POD 4 revision of right L2/3, L3/4 MIS.   Resting in bed.   No acute events overnight.   States pain in buttocks and posterior thigh is improving.   States she has been ambulating around her room yesterday.   Pain controlled on current medications    Moves all extremities.   No deficits.     Continue current pain medication regimen  Continue decadron  Continue daily dressing changes  Continue PT/OT  SCDs and lovenox for DVT prophylaxis  Pending rehab placement.     Jhony Alvarez PA-C  Neurosurgery  Rounding for Dr. Aquino

## 2022-08-22 NOTE — PLAN OF CARE
Problem: Rehabilitation (IRF) Plan of Care  Goal: Plan of Care Review  Outcome: Ongoing, Progressing  Goal: Patient-Specific Goal (Individualized)  Outcome: Ongoing, Progressing  Goal: Absence of New-Onset Illness or Injury  Outcome: Ongoing, Progressing  Goal: Optimal Comfort and Wellbeing  Outcome: Ongoing, Progressing  Goal: Readiness for Transition of Care  Outcome: Ongoing, Progressing     Problem: Bariatric Environmental Safety  Goal: Safety Maintained with Care  Outcome: Ongoing, Progressing     Problem: Fall Injury Risk  Goal: Absence of Fall and Fall-Related Injury  Outcome: Ongoing, Progressing     Problem: Pain Acute  Goal: Acceptable Pain Control and Functional Ability  Outcome: Ongoing, Progressing

## 2022-08-22 NOTE — CONSULTS
[facility]  Inpatient Rehab Prescreen    PATIENT INFORMATION     Assessment date/time: 08/22/2022 10:33 AM    Name: Marina Dixon Phone: [phone]   Address:   [facility] [address]  [facility] [address] SSN:    YOB: 1960 Age: 62 y.o. Gender: female   Race: White   Marital Status:    Advance Directives: Full code     COVERAGE INFORMATION     Patient Medicare #:      Primary Insurance Type:  /  Secondary Insurance Type:  / N/A   Policy #:   [id number] Policy #:     Insurance contact name/number:  Insurance contact name/number:    Authorization #: [id number] Authorization #:    Verified Coverage: Insurance Carrier   Pending Coverage:    Prescription Coverage:  Insurance details/comments:      PHYSICIAN/REFERRAL INFORMATION     Primary Care Physician: Kalli Blanchard MD Attending Physician: Roderick Serna MD   Consulting physician/specialist:  Referring Physician:    Referring facility: [facility] Referring contact name/phone:    Physician details/comments:      CONTACT INFORMATION   Extended Emergency Contact Information  Primary Emergency Contact: dax dixon  Mobile Phone: [phone]  Relation: Spouse  Preferred language: English   needed? No    PRIOR LIVING SITUATION         Bedroom location/setup: 1st floor   Bathroom location/setup: 1st floor   Equipment at home: CPAP, straight cane   Prior device use:       PRIOR LEVEL OF FUNCTION     Did a helper need to assist with the following activities prior to the current illness, exacerbation, or injury?   Self-care:  No, independent   Indoor mobility:   No, independent   Stairs:    Functional Cognition:  No, independent   Comments:    Caregivers providing assistance:   Pre-hospital home care service:  Name of Agency:    Home/personal responsibilities: pt. Is retired. She cooks, cleans, drives, grocery shops.  She was completely independent   Pre-hospital vocational category: Retired  for age   Pre-hospital vocational effort: Retired for age   Occupation/profession:  Return to work/school plan:    Educational history:    Hobbies/leisure activities:  Resources used prior to admission:    Available resources:  Resource information comments:      REHABILITATION DIAGNOSIS     History of present illness:   Ms. Marina Dixon is a 62-year-old pleasant lady who had surgery done 2 days   Prior to presentation. She went home after right L2-3 and L3-4 MIS hemilaminectomy and diskectomy.  She had gone home and walked. She called a couple of times after discharge. She had increasing back pain, leg pain. She was started on some steroids.  She called again, and was told to go to the emergency room because she was concerned about some difficulty with the urgency.  She came to the hospital for evaluation/ imaging.  She was awake, alert.  She had good strength in the legs, but severe pain and some numbness in the buttock area.  Obtained MRI and the MR Radiology read it as fluid collection with pressure on the thecal sac.   reviewed pre and post MRIs  and there is some narrowing, but not as significant as what the radiologist thinks.  However, because of her pain level increased, MD proceeded with surgery. She was noted to have a hematoma. She had re-do of L2-3, L3-4 laminectomy, foraminotomy, and removal of postop hematoma 8/18/2022. She tolerated surgery well.  Her pain has improved.  She is participating with therapy and will benefit from an inpatient rehab stay. This will help to increase patient's functional independence with ADLs and mobility prior to return home.   Pt. Requires acute inpatient rehab admission with 24-hour nursing and active physician oversight to monitor and manage acute medical comorbid conditions, labs, pain, and functional deficits.  Patient/family will also require teaching and integration of improving functional skills into daily living.  She will also require individualized,  interdisciplinary approach to her care, receiving PT,OT services 3 hours per day, 5 days per week.    Required care cannot be provided at lower level of care. Patient is anticipated to require approximately 7-10 days LOS with expected discharge home with spouse with HH.   Impairment group (IGC):   Other Neurologic 03.9 Etiologic diagnosis/description:   Radiculopathy/hematoma   Date of onset:  8/17/2022 Date of surgery: 8/18/2022   Allergies: Sulfa (sulfonamide antibiotics)   Comorbid condition: Osteoarthritis   Medical/functional conditions requiring inpatient rehabilitation:   This patient requires medical management/24-hour nursing?of complex  comorbidities, labs,medications (see medications list), pain, incision care,sleep  hygiene, anticoagulation, nutrition, hydration, neurological,    pulmonary, and cardiac status, and preventive healthcare.        This patient requires intense therapy and an integrated,    interdisciplinary approach to address safety, impaired mobility,   impaired ADLs (dressing, toileting, grooming, showering),   pulmonary insufficiency,preventive healthcare, medication management, integration of improving functional skills into daily living, and home caregiver    support and training.    Risk for medical/clinical complications:   This patient is at risk for the following complications: DVT/PE, pneumonia,  malnutrition, neurological decline, respiratory insufficiency,    worsening activity intolerance, complications from anticoagulation,    Incision infection, skin breakdown, inadequate  Sleep, and constipation.      SPECIAL REHABILITATION NEEDS     IV: 20 gauge left hand; left antecubital 18 gauge Preferred Language: English        Peripheral IV - Single Lumen 08/18/22 0545 20 G Left Hand (Active)   Site Assessment Clean;Dry;Intact;No redness;No swelling;No warmth;No drainage 08/22/22 1016   Extremity Assessment Distal to IV No warmth;No swelling;No redness;No abnormal discoloration  "08/22/22 1016   Line Status Saline locked 08/22/22 1016   Dressing Status Clean;Dry;Intact 08/22/22 1016   Dressing Intervention Integrity maintained 08/22/22 1016            Peripheral IV - Single Lumen 08/18/22 0552 18 G Left Antecubital (Active)   Site Assessment Clean;Dry;Intact;No redness;No swelling;No warmth;No drainage 08/22/22 1016   Extremity Assessment Distal to IV No abnormal discoloration;No redness;No swelling;No warmth 08/22/22 1016   Line Status Saline locked 08/22/22 1016   Dressing Status Clean;Dry;Intact 08/22/22 1016   Dressing Intervention Integrity maintained 08/22/22 1016          PRECAUTIONS     Orthotic device type: LSO, Wearning schedule: OOB with therapy, Safety/fall precautions: High fall risk and Spine precautions: back surgery     PAST MEDICAL, SOCIAL, FAMILY HISTORY     Pertinent past medical history:   Past Medical History:   Diagnosis Date    Arthritis     Endometriosis of uterus 2002    GERD (gastroesophageal reflux disease)     Hypertension 2020    Menopause 2002    Hysterectomy    NINA (obstructive sleep apnea)     wears cpap    Sciatica       Has the patient had two or more falls in the past year or any fall with injury in the past year: NO   Has the patient had major surgery during the 100 days prior to admission: YES   Family Medical History:   Family History   Problem Relation Age of Onset    Diabetes Mother     Heart failure Mother     Hypertension Mother     Cancer Father     Rheum arthritis Sister     Cancer Brother       Substance use history:   Social History     Substance and Sexual Activity   Alcohol Use Never     Social History     Tobacco Use   Smoking Status Never Smoker   Smokeless Tobacco Never Used     Social History     Substance and Sexual Activity   Drug Use Never      VITALS   BP:  (!) 147/78     Temp: 97.6 °F (36.4 °C)     HR: (!) 55     Resp: 18     SpO2: 99 %     Height: 5' 4" (1.626 m)     Weight: 106.6 kg (235 lb)     BMI: 40.3        "   MED/LABS/DIAGNOSITICS   No current facility-administered medications for this encounter.     No current outpatient medications on file.     Facility-Administered Medications Ordered in Other Encounters   Medication Dose Route Frequency Provider Last Rate Last Admin    0.9%  NaCl infusion   Intravenous Continuous EDNA Veliz 100 mL/hr at 08/18/22 1300 New Bag at 08/18/22 1300    acetaminophen tablet 650 mg  650 mg Oral Q6H PRN EDNA Veliz        aluminum-magnesium hydroxide-simethicone 200-200-20 mg/5 mL suspension 30 mL  30 mL Oral Q4H PRN EDNA Veliz   30 mL at 08/19/22 1527    bisacodyL suppository 10 mg  10 mg Rectal Daily EDNA Veliz        calcium carbonate 200 mg calcium (500 mg) chewable tablet 500 mg  500 mg Oral Daily PRN EDNA Veliz   500 mg at 08/19/22 0708    dexAMETHasone tablet 6 mg  6 mg Oral Q6H EDNA Veliz   6 mg at 08/22/22 0506    diazePAM tablet 10 mg  10 mg Oral Q6H PRN EDNA Veliz   10 mg at 08/22/22 0852    docusate sodium capsule 100 mg  100 mg Oral BID PRN Osvaldo Aquino MD        enoxaparin injection 40 mg  40 mg Subcutaneous Daily EDNA Veliz   40 mg at 08/21/22 1607    gabapentin capsule 600 mg  600 mg Oral TID EDNA Veliz   600 mg at 08/22/22 0852    HYDROcodone-acetaminophen  mg per tablet 1 tablet  1 tablet Oral Q4H PRN EDNA Veliz   1 tablet at 08/22/22 0853    HYDROcodone-acetaminophen 7.5-325 mg per tablet 2 tablet  2 tablet Oral Q4H PRN EDNA Veliz   2 tablet at 08/21/22 1036    HYDROmorphone (PF) injection 0.4 mg  0.4 mg Intravenous Q5 Min PRN Georges Smith MD   0.4 mg at 08/21/22 1608    HYDROmorphone (PF) injection 1 mg  1 mg Intravenous Q4H PRN Osvaldo Aquino MD   1 mg at 08/21/22 1605    melatonin tablet 6 mg  6 mg Oral Nightly PRN Osvaldo Aquino MD        ondansetron injection 4 mg  4 mg Intravenous Q8H PRN Osvaldo Aquino MD   4 mg at 08/20/22 0029     ondansetron injection 4 mg  4 mg Intravenous Daily PRN Georges Smith MD   4 mg at 08/19/22 0004    pantoprazole EC tablet 40 mg  40 mg Oral Daily Osvaldo Aquino MD   40 mg at 08/22/22 0852    polyethylene glycol packet 17 g  17 g Oral Daily Osvaldo Aquino MD   17 g at 08/21/22 0918    prochlorperazine injection Soln 10 mg  10 mg Intravenous Q6H PRN EDNA Veliz        senna-docusate 8.6-50 mg per tablet 2 tablet  2 tablet Oral BID EDNA Veliz   2 tablet at 08/21/22 0918    sodium chloride 0.9% flush 10 mL  10 mL Intravenous PRN Osvaldo Aquino MD        triamterene-hydrochlorothiazide 37.5-25 mg per capsule 1 capsule  1 capsule Oral Daily Osvaldo Aquino MD   1 capsule at 08/22/22 0949      Pertinent lab results:   Lab Results   Component Value Date    WBC 10.9 08/19/2022    HGB 10.9 (L) 08/19/2022    HCT 34.8 (L) 08/19/2022     08/19/2022     08/19/2022    K 3.4 (L) 08/19/2022    BUN 22.2 (H) 08/19/2022    CO2 26 08/19/2022      Pertinent diagnostic studies:    Additional labs and diagnostic studies required prior to admission:      QI: GG Care Tool     QI: GG Care Tool  Therapy Evaluation   Swallowing/  Nutritional Status   Diet/Feeding/  Swallowing    Eating       Oral Hygiene   Grooming    Toileting Hygiene       Shower/Bathe   Bathing Min assist to shower   Upper Body Dressing   Dressing Upper Donning LSO in seated modified independent   Lower Body Dressing   Dressing Lower Don/doff socks figure 4 seated EOB modified independent   Putting On/Taking Off Footwear       Toilet Transfer   Toileting    Bladder Continence Status   Bladder     Bowel Continence Status   Bowel     Roll Left and Right   Bed Mobility    Sit to Lying       Lying to Sitting on Side of Bed       Sit to Stand       Chair/Bed-to- Chair   Transfers   Sit to stand min to standby assist with RW   Car Transfer       Walk 10 Feet   Equipment   RW   Walk 50 Feet with Two Turns   Balance    Walk 150 Feet   Endurance     Walk 10 Feet on Uneven Surfaces   Gait Min assist 20 ft RW   Picking up an Object       Wheel 50 Feet with Two Turns   Wheelchair    Wheel 150 Feet       1 Step (Curb)   Stairs    4 Steps       12 Steps       Expression of Ideas and Wants   Communication    Understanding  Verbal and Non-Verbal Content       Cognitive Patterns   Cognition       Safety Precautions       Lower Extremity      Strength       ROM       Upper Extremity      Strength       ROM      Care Score Value Definitions  6: Independent. [place] provides no assistance with tasks. A device may or may not have been used.  5: Set-up or clean-up assistance. [place] sets up or cleans up, but does not assist with tasks. [place] may have assisted prior to or following the activity.  4: Supervision or touching assistance. [place] provides verbal cues or touching/steadying or contact guard assistance. Assistance may be provided throughout the activity or intermittently.  3: Partial/moderate assistance. [place] does less than half the effort. [place] lifts, holds, or supports trunk or limbs, but provides less than half the effort.  2: Substantial/maximal assistance. [place] does more than half the effort. [place] lifts or holds trunk or limbs, and provides more than half the effort.  1: Dependent. [place] does all of the effort, or the assistance of two or more helpers is required for the patient to complete the activity.  Care Score Activity Not Attempted Value Definitions  7: Patient refused.  9: Not applicable. Not attempted and the patient did not perform this activity prior to the current illness, exacerbation, or injury.  10: Not attempted due to environmental limitations (e.g., lack of equipment, weather constraints).  88: Not attempted due to medical condition or safety concerns.    DISCHARGE GOALS/ANTICPATED INTERVENTIONS/SERVICES     Expected Level of Improvement for Safe Discharge: Anticipate discharge home at or near baseline level of functioning and/or  decreased burden of care.               Patient/Family/Caregiver Goals: To be able to be independent again without so much pain   Required Treatments/Services: Rehabilitation Nursing, Dietitian/nutrition, Social  and Case Management   Required Therapy Therapy Type Min/Day Days/Week Duration of Therapy   Physical Therapy 90 5 7-10 days   Occupational Therapy 90 5 7-10 days   Speech and Language Pathology      Prosthetic/Orthotics      Recreational Therapy      Anticipated Services upon Discharge:  Home Health PT/OT vs outpatient therapy   Additional rehabilitation needs:  N/A   Expected Discharge Destination:  Home with spouse with HH   Barriers to Discharge: None   Discharge Support: Patient has a caregiver available, Discharge plan has been verified with patient's caregiver and Caregiver is in agreement with the discharge plan   Patient/Family/Caregiver Orientation: Patient/family/caregiver oriented and agreeable to inpatient rehabilitation plan   Estimated Length of Stay: 7-10 days   Projected Admission Date: 8/22/2022   Medical Prognosis: good   Physicians Review and Admission Determination:   I have discussed patient with Nurse Liaison. I have reviewed the prescreen. Patient is in need of, appropriate for and should tolerate and benefit from an aggressive IRF stay.

## 2022-08-22 NOTE — H&P
[facility] ([facility])  Rehab Admission H&P    Patient Name: Marina Dixon  MRN: [id number]  Age: 62 y.o. Sex: female  : 1960  Hospital Length of Stay: 1 days  Date of Service: 2022   Chief Complaint:  Lumbar radiculopathy s/p revision of right L2-3, L3-4 MIS with removal of hematoma on 2022    Subjective:   History of Present Illness   62-year-old white female presented to [facility] ED on 2022 complaining of buttocks spasms, back pain, and leg pain that worsened on  s/p right L2-3 and L3-4 MIS hemilaminectomy and diskectomy on 8/15.  Denied bowel/bladder dysfunction.  PMH significant for NINA, lumbar stenosis, and HTN.  Imaging significant for fluid collection with pressure of the thecal sac.  Tolerated revision of right L2-3, L3-4 MIS with removal of hematoma on  without perioperative complications.  Robaxin initiated without relief of muscle spasms.  Valium and Decadron initiated on  with improvement pain/spasms.  Able to mobilize on  with adjustment of medications.  Tolerated transfer to [facility] inpatient rehab unit on  without incident.  Lying comfortably in bed.  Reports good sleep and appetite.  Last BM .  Vital signs at goal with no recorded fevers.  Lab work unremarkable.  Recent imaging reported below.       Past Medical History:  Lumbar radiculopathy s/p laminectomy on 08/15/2022 with revision laminectomy and removal of hematoma on 2022, vitamin-D deficiency, GERD, HTN, NINA, normocytic anemia  Procedure history:  Hysterectomy, bilateral cataract extraction, lumpectomy, lumbar laminectomy, I&D with removal of hematoma 2022.  Family History:  Mother:  CHF +DM type 2+  in her late 70s.  Father:  Prostate cancer +HTN + in his late 70s  Social History:   (-) TOB  (-) ETOH  (-) Illicit drug use  Bachelor's degree of science and pharmacy.  Retired pharmacist.  With .   is retired.  He drives,  manages finances, and does yd work.  He can physically assist.  Prior level of function:  Independent ADLs, drives, cooks, does chores, does laundry, grocery shops, manages finances, and manages own medications.  Residence:  Lives with spouse in [place] in a single-story home with 2 steps and no railing to enter the residence.  She uses a walk-in shower with a 6 in threshold and a grab bar.  No HH S.  She does not have an elevated toilet.  Has grab bars adjacent to toilet.  DME:  CPAP, grab bars, and a straight cane  Anticipated discharge destination:  Home with home health    Review of patient's allergies indicates:   Allergen Reactions    Sulfa (sulfonamide antibiotics) Itching and Rash      No current facility-administered medications for this encounter.  No current outpatient medications on file.    Facility-Administered Medications Ordered in Other Encounters:     0.9%  NaCl infusion, , Intravenous, Continuous, EDNA Veliz, Last Rate: 100 mL/hr at 08/18/22 1300, New Bag at 08/18/22 1300    acetaminophen tablet 650 mg, 650 mg, Oral, Q6H PRN, EDNA Veliz    aluminum-magnesium hydroxide-simethicone 200-200-20 mg/5 mL suspension 30 mL, 30 mL, Oral, Q4H PRN, EDNA Veliz, 30 mL at 08/19/22 1527    bisacodyL suppository 10 mg, 10 mg, Rectal, Daily, EDNA Veliz    calcium carbonate 200 mg calcium (500 mg) chewable tablet 500 mg, 500 mg, Oral, Daily PRN, EDNA Veliz, 500 mg at 08/19/22 0708    dexAMETHasone tablet 6 mg, 6 mg, Oral, Q6H, EDNA Veliz, 2 mg at 08/22/22 1123    diazePAM tablet 10 mg, 10 mg, Oral, Q6H PRN, EDNA Veliz, 10 mg at 08/22/22 0852    docusate sodium capsule 100 mg, 100 mg, Oral, BID PRN, Osvaldo Aquino MD    enoxaparin injection 40 mg, 40 mg, Subcutaneous, Daily, EDNA Veliz, 40 mg at 08/21/22 1607    gabapentin capsule 600 mg, 600 mg, Oral, TID, EDNA Veliz, 600 mg at 08/22/22 0850     "HYDROcodone-acetaminophen  mg per tablet 1 tablet, 1 tablet, Oral, Q4H PRN, EDNA Veliz, 1 tablet at 08/22/22 0853    HYDROcodone-acetaminophen 7.5-325 mg per tablet 2 tablet, 2 tablet, Oral, Q4H PRN, EDNA Veliz, 2 tablet at 08/21/22 1036    HYDROmorphone (PF) injection 0.4 mg, 0.4 mg, Intravenous, Q5 Min PRN, Will MD Sarah, 0.4 mg at 08/21/22 1608    HYDROmorphone (PF) injection 1 mg, 1 mg, Intravenous, Q4H PRN, Osvaldo Aquino MD, 1 mg at 08/21/22 1605    melatonin tablet 6 mg, 6 mg, Oral, Nightly PRN, Osvaldo Aquino MD    ondansetron injection 4 mg, 4 mg, Intravenous, Q8H PRN, Osvaldo Aquino MD, 4 mg at 08/20/22 0029    ondansetron injection 4 mg, 4 mg, Intravenous, Daily PRN, Will MD Sraah, 4 mg at 08/19/22 0004    pantoprazole EC tablet 40 mg, 40 mg, Oral, Daily, Osvaldo Aquino MD, 40 mg at 08/22/22 0852    polyethylene glycol packet 17 g, 17 g, Oral, Daily, Osvaldo Aquino MD, 17 g at 08/21/22 0918    prochlorperazine injection Soln 10 mg, 10 mg, Intravenous, Q6H PRN, EDNA Veliz    senna-docusate 8.6-50 mg per tablet 2 tablet, 2 tablet, Oral, BID, EDNA Veliz, 2 tablet at 08/21/22 0918    sodium chloride 0.9% flush 10 mL, 10 mL, Intravenous, PRN, Osvaldo Aquino MD    triamterene-hydrochlorothiazide 37.5-25 mg per capsule 1 capsule, 1 capsule, Oral, Daily, Osvaldo Aquino MD, 1 capsule at 08/22/22 0949     Review of Systems   Complete 12-point review of symptoms negative except for what's mentioned in HPI     Objective:     BP (!) 156/80   Pulse 60   Temp 98.2 °F (36.8 °C) (Oral)   Resp 20   Ht 5' 6" (1.676 m)   Wt 114.9 kg (253 lb 4.9 oz)   SpO2 98%   BMI 40.89 kg/m²       Intake/Output Summary (Last 24 hours) at 8/23/2022 1039  Last data filed at 8/23/2022 0900  Gross per 24 hour   Intake 760 ml   Output --   Net 760 ml       Physical Exam  Constitutional:       Appearance: Normal appearance.   HENT:      Head: Normocephalic.      " Mouth/Throat:      Mouth: Mucous membranes are moist.   Eyes:      Pupils: Pupils are equal, round, and reactive to light.   Cardiovascular:      Rate and Rhythm: Normal rate and regular rhythm.      Heart sounds: Normal heart sounds.      Comments: BL LE trace edema.  Pulmonary:      Effort: Pulmonary effort is normal.      Breath sounds: Normal breath sounds.   Abdominal:      General: Bowel sounds are normal.      Palpations: Abdomen is soft.      Comments: Obese   Musculoskeletal:      Cervical back: Neck supple.      Comments: Generalized weakness and muscle atrophy.  Lumbar dressing clean and intact.  Incision looks good with no drainage.  Steri-Strips intact.   Skin:     General: Skin is warm and dry.   Neurological:      General: No focal deficit present.      Mental Status: She is alert and oriented to person, place, and time.   Psychiatric:         Mood and Affect: Mood normal.         Behavior: Behavior normal.         Thought Content: Thought content normal.         Judgment: Judgment normal.     *MD performed and documented physical examination     Lines/Drains/Airways     None             Labs  Admission on 08/22/2022   Component Date Value Ref Range Status    Sodium Level 08/23/2022 141  136 - 145 mmol/L Final    Potassium Level 08/23/2022 4.2  3.5 - 5.1 mmol/L Final    Chloride 08/23/2022 102  98 - 107 mmol/L Final    Carbon Dioxide 08/23/2022 31  23 - 31 mmol/L Final    Glucose Level 08/23/2022 136 (A) 82 - 115 mg/dL Final    Blood Urea Nitrogen 08/23/2022 27.3 (A) 9.8 - 20.1 mg/dL Final    Creatinine 08/23/2022 0.97  0.55 - 1.02 mg/dL Final    Calcium Level Total 08/23/2022 9.2  8.4 - 10.2 mg/dL Final    Protein Total 08/23/2022 6.5  5.8 - 7.6 gm/dL Final    Albumin Level 08/23/2022 3.1 (A) 3.4 - 4.8 gm/dL Final    Globulin 08/23/2022 3.4  2.4 - 3.5 gm/dL Final    Albumin/Globulin Ratio 08/23/2022 0.9 (A) 1.1 - 2.0 ratio Final    Bilirubin Total 08/23/2022 0.3  <=1.5 mg/dL Final     Alkaline Phosphatase 08/23/2022 78  40 - 150 unit/L Final    Alanine Aminotransferase 08/23/2022 25  0 - 55 unit/L Final    Aspartate Aminotransferase 08/23/2022 18  5 - 34 unit/L Final    eGFR 08/23/2022 >60  mls/min/1.73/m2 Final    Magnesium Level 08/23/2022 2.40  1.60 - 2.60 mg/dL Final    Phosphorus Level 08/23/2022 4.1  2.3 - 4.7 mg/dL Final    Prealbumin 08/23/2022 23.5  14.0 - 37.0 mg/dL Final    Iron Binding Capacity Unsaturated 08/23/2022 177  70 - 310 ug/dL Final    Iron Level 08/23/2022 66  50 - 170 ug/dL Final    Transferrin 08/23/2022 205  173 - 360 mg/dL Final    Iron Binding Capacity Total 08/23/2022 243 (A) 250 - 450 ug/dL Final    Iron Saturation 08/23/2022 27  20 - 50 % Final    WBC 08/23/2022 11.6 (A) 4.5 - 11.5 x10(3)/mcL Final    RBC 08/23/2022 3.79 (A) 4.20 - 5.40 x10(6)/mcL Final    Hgb 08/23/2022 11.0 (A) 12.0 - 16.0 gm/dL Final    Hct 08/23/2022 34.0 (A) 37.0 - 47.0 % Final    MCV 08/23/2022 89.7  80.0 - 94.0 fL Final    MCH 08/23/2022 29.0  27.0 - 31.0 pg Final    MCHC 08/23/2022 32.4 (A) 33.0 - 36.0 mg/dL Final    RDW 08/23/2022 13.4  11.5 - 17.0 % Final    Platelet 08/23/2022 342  130 - 400 x10(3)/mcL Final    MPV 08/23/2022 9.5  7.4 - 10.4 fL Final    Neut % 08/23/2022 71.0  % Final    Lymph % 08/23/2022 18.5  % Final    Mono % 08/23/2022 6.1  % Final    Eos % 08/23/2022 0.0  % Final    Basophil % 08/23/2022 0.2  % Final    Lymph # 08/23/2022 2.15  0.6 - 4.6 x10(3)/mcL Final    Neut # 08/23/2022 8.3  2.1 - 9.2 x10(3)/mcL Final    Mono # 08/23/2022 0.71  0.1 - 1.3 x10(3)/mcL Final    Eos # 08/23/2022 0.00  0 - 0.9 x10(3)/mcL Final    Baso # 08/23/2022 0.02  0 - 0.2 x10(3)/mcL Final    IG# 08/23/2022 0.49 (A) 0 - 0.04 x10(3)/mcL Final    IG% 08/23/2022 4.2  % Final    NRBC% 08/23/2022 0.0  % Final     Radiology  MRI lumbar spine with and without contrast on 8/17/2022, IMPRESSION: Severe canal stenosis at L2-L3 and L3-L4, moderate at L4-L5, with dorsal  epidural fluid collection. Multilevel neural foraminal stenoses as described. Postoperative fluid in the right sided laminectomy beds and subcutaneous soft tissues.     Assessment/Plan:     62 y.o. WF admitted on 8/22/2022    Lumbar radiculopathy   - s/p right L2-3 and L3-4 MIS hemilaminectomy and diskectomy on 8/15  - s/p revision of right L2-3, L3-4 MIS with removal of hematoma on 8/18/2022  - continue    Lovenox 40 mg daily    Gabapentin 600 mg t.i.d.    Cymbalta 60 mg daily     Robaxin 500 mg t.i.d.    Decadron taper    Norco 10 mg/325 mg q.4 hours p.r.n.  - Consult physiatry for rehab and pain management  - PT/OT/RT/ST to eval and treat     Vitamin-D deficiency   - obtain vitamin-D level with next labs  - continue    Vitamin-D 5000 units daily    GERD  - Avoid spicy foods, and nothing to eat or drink within x2 hours of bedtime or laying flat (water is ok)   - Avoid NSAIDs (Advil, ibuprofen, naproxen...) and ramirez-2 inhibitors (Mobic, Celebrex)    - initiate   GI cocktail x 1 now  - continue   Protonix 40 mg daily    Pepcid 20 mg BID prn    Constipation  - last BM 8/22  - continue    Movantik 25 mg daily (initiated 8/23)    MiraLax 17 g daily    HTN  - at goal!!  - continue   Triamterene/HCTZ 37.5 mg-25 mg daily   Hydralazine 10 mg every 2 hours as needed for BP > 160/90   Labetalol 10 mg every 2 hours as needed for BP > 160/90    NINA  - stable   - compliant with CPAP at home    Normocytic anemia  - asymptomatic  - H/H stable   - no evidence of active bleeds  - will closely monitor and transfuse if needed     MEDICAL PLAN:  - Admit to [facility]  - Medical reconciliation completed and included in the electronic health record  - Draw admit labs including CBC, CMP, and Prealbumin  - Maintain weightbearing status as ordered  - Monitor postoperative surgical incision changing dressing daily  - Teach skin protection and wound prevention techniques  - Initiate fall  precaution  - Initiate bowel training program  - Initiate bladder training as indicated  - Pain management  - IS q2 hours while awake    THERAPEUTIC PLAN:  - Physical therapy 60-90 minutes 5 to week to increase functional mobility, maintain weightbearing precautions, increase lower extremity restrengthening, increase overall activity tolerance, teach balance and safety measures as well as fall precautions, make equipment and orthotic recommendations, be involved in family training and discharge planning, monitor pain levels and vital signs during therapy adjusting treatment accordingly  - Occupational therapy 60-90 minutes 5 times a week to increase functional independence with activities of daily living, maintain weightbearing precautions, teach use of adaptive equipment, increase upper extremity restrengthening, increase overall activity tolerance, teach balance and safety measures as well as fall precautions, make equipment and orthotic recommendations, be involved in family training and discharge planning, monitor pain levels and vital signs during therapy adjusting treatment accordingly  - Speech and language pathology will do an in-depth evaluation of patient's cognition, phonation, and swallowing. They will initiate therapy 30- 60 minutes 5 times a week as indicated  - Recreational therapy will incorporate all patient's functional abilities  into recreational activities that will require visual, spatial, and perceptual abilities; gross and fine motor coordination skills; the cognition to follow multistep commands; dynamic and static balance and reaching abilities. They will also incorporate animal assisted therapy into their weekly program  - Rehabilitation nursing will act as patient family physician liaison. They will integrate patient's functional abilities, after discussions with therapist, into everyday activities with the CNA's,  LPN's and RNs that will include but are not limited to dressing, bathing,  feeding, grooming, toileting, transfers, hygiene etc. They will administer medications watching for wanted as well as unwanted effects. They will initiate DVT/VTE prophylaxis as ordered. Will monitor vital signs, lab values, and pain levels, making appropriate physician notification. They will monitor skin integrity including postop incision, making daily dressing changes. They will teach skin protection and wound prevention techniques. They will initiate bowel training They will initiate bladder training as indicated. They will initiate fall precautions  - Rehabilitation counseling/case management will act as patient and family liaison. They will set up family conferences, family training, aid in discharge planning, and aid in the procurement of assistive devices  - Patient will have 24 hour availability to physiatric care  - Patient will have nutritional services involved, monitoring oral input and visceral protein stores. They will make dietary and supplement recommendations and follow-up as necessary  - Patient will have weekly interdisciplinary team conferences  - Patient will have initial family conference and follow up conferences as indicated  - Patient will have family training prior to discharge     Estimated length of stay - 14-17 days  Anticipated discharge destination - Home with   Medical status - stabilizing postoperatively; will need to monitor pain levels, postoperative skin integrity, watch for evidence of deep vein thrombosis, monitor postoperative H&H, monitor vital signs closely.  Medical prognosis - Good for post-op healing and functional improvement  Previous functional Status:  Independent  Present Functional Status:  Min assist    VTE Prophylaxis:  Lovenox 40 mg daily  COVID-19 testing:  Negative on 08/22/2022  COVID-19 vaccination status:  Vaccinated (Pfizer):  03/05/2021, 03/06/2021, and 12/21/2021    POA: No  Living will: No  Contacts:  Petr Dixon () [phone]      Shadia  Yaya (daughter) [phone]    CODE STATUS: Full Code  Internal Medicine (attending): Roderick Serna MD  Physiatry (consulting):  Arsh Wood MD    OUTPATIENT PROVIDERS  PCP: Kalli Sweeney MD  Neuro surgery: Osvaldo Aquino MD  Cardiology: Karl Jonas MD    DISPOSITION: Condition stable. Tolerated transfer.  Recent labs and imaging reviewed.  Rehab admission orders initiated.  Med reconciliation completed.  Consult physiatry for rehab and pain management.  PT/OT/RT/ST to eval and treat.  Sleep hygiene, bowel maintenance, and appetite at goal.  Vital signs at goal with no recorded fevers.  Lab work unremarkable.  Discontinue Colace and initiate Movantik 25 mg daily.  Continue MiraLax 17 g daily.  Monitor closely.  Notify of acute changes.    PHYSICIAN ATTESTATION: I have been involved in the patient's rehabilitation prescreening process and I have now completed the post admission physician evaluation. Patient is in need of, appropriate for, and should tolerate the above outlined inpatient rehabilitation plan. I believe this is necessary to reach maximal postoperative healing and maximal functional abilities. I do not believe this would be possible in a timely fashion in a less intensive setting      Ravin Kang NP conducted independent physical examination and assisted with medical documentation.    Total time spent on this encounter including chart review and direct MD + NP 1-on-1 patient interaction: 109 minutes   Over 50% of this time was spent in counseling and coordination of care

## 2022-08-22 NOTE — PLAN OF CARE
Problem: Adult Inpatient Plan of Care  Goal: Plan of Care Review  8/22/2022 1243 by Bryan Vaughn LPN  Outcome: Adequate for Care Transition  8/22/2022 1021 by Bryan Vaughn LPN  Outcome: Ongoing, Progressing  Goal: Patient-Specific Goal (Individualized)  8/22/2022 1243 by Bryan Vaughn LPN  Outcome: Adequate for Care Transition  8/22/2022 1021 by Bryan Vaughn LPN  Outcome: Ongoing, Progressing  Goal: Absence of Hospital-Acquired Illness or Injury  8/22/2022 1243 by Bryan Vaughn LPN  Outcome: Adequate for Care Transition  8/22/2022 1021 by Bryan Vaughn LPN  Outcome: Ongoing, Progressing  Goal: Optimal Comfort and Wellbeing  8/22/2022 1243 by Bryan Vaughn LPN  Outcome: Adequate for Care Transition  8/22/2022 1021 by Bryan Vaughn LPN  Outcome: Ongoing, Progressing  Goal: Readiness for Transition of Care  8/22/2022 1243 by Bryan Vaughn LPN  Outcome: Adequate for Care Transition  8/22/2022 1021 by Bryan Vaughn LPN  Outcome: Ongoing, Progressing     Problem: Bariatric Environmental Safety  Goal: Safety Maintained with Care  8/22/2022 1243 by Bryan Vaughn LPN  Outcome: Adequate for Care Transition  8/22/2022 1021 by Bryan Vaughn LPN  Outcome: Ongoing, Progressing     Problem: Infection  Goal: Absence of Infection Signs and Symptoms  8/22/2022 1243 by Bryan Vaughn LPN  Outcome: Adequate for Care Transition  8/22/2022 1021 by Bryan Vaughn LPN  Outcome: Ongoing, Progressing     Problem: Fall Injury Risk  Goal: Absence of Fall and Fall-Related Injury  8/22/2022 1243 by Bryan Vaughn LPN  Outcome: Adequate for Care Transition  8/22/2022 1021 by Bryan Vaughn LPN  Outcome: Ongoing, Progressing     Problem: Pain Acute  Goal: Acceptable Pain Control and Functional Ability  8/22/2022 1243 by Bryan Vaughn LPN  Outcome: Adequate for Care Transition  8/22/2022 1021 by Bryan Vaughn LPN  Outcome: Ongoing, Progressing

## 2022-08-22 NOTE — PLAN OF CARE
Problem: Adult Inpatient Plan of Care  Goal: Absence of Hospital-Acquired Illness or Injury  Outcome: Ongoing, Progressing     Problem: Fall Injury Risk  Goal: Absence of Fall and Fall-Related Injury  Outcome: Ongoing, Progressing     Problem: Pain Acute  Goal: Acceptable Pain Control and Functional Ability  Outcome: Ongoing, Progressing

## 2022-08-22 NOTE — PLAN OF CARE
Pt approved for [facility] for today, her covid is neg. Td notified for transport  Nursing ( monica ) is to call report to [phone]  Dc orders are already in

## 2022-08-22 NOTE — PLAN OF CARE
covid at lab. If neg pt can to to [facility]. Dr Aquino has dc orders in and pts nurse is aware.   Will follow for covid results then set up transport

## 2022-08-23 LAB
ALBUMIN SERPL-MCNC: 3.1 GM/DL (ref 3.4–4.8)
ALBUMIN/GLOB SERPL: 0.9 RATIO (ref 1.1–2)
ALP SERPL-CCNC: 78 UNIT/L (ref 40–150)
ALT SERPL-CCNC: 25 UNIT/L (ref 0–55)
AST SERPL-CCNC: 18 UNIT/L (ref 5–34)
BASOPHILS # BLD AUTO: 0.02 X10(3)/MCL (ref 0–0.2)
BASOPHILS NFR BLD AUTO: 0.2 %
BILIRUBIN DIRECT+TOT PNL SERPL-MCNC: 0.3 MG/DL
BUN SERPL-MCNC: 27.3 MG/DL (ref 9.8–20.1)
CALCIUM SERPL-MCNC: 9.2 MG/DL (ref 8.4–10.2)
CHLORIDE SERPL-SCNC: 102 MMOL/L (ref 98–107)
CO2 SERPL-SCNC: 31 MMOL/L (ref 23–31)
CREAT SERPL-MCNC: 0.97 MG/DL (ref 0.55–1.02)
EOSINOPHIL # BLD AUTO: 0 X10(3)/MCL (ref 0–0.9)
EOSINOPHIL NFR BLD AUTO: 0 %
ERYTHROCYTE [DISTWIDTH] IN BLOOD BY AUTOMATED COUNT: 13.4 % (ref 11.5–17)
FERRITIN SERPL-MCNC: 103.06 NG/ML (ref 4.63–204)
GFR SERPLBLD CREATININE-BSD FMLA CKD-EPI: >60 MLS/MIN/1.73/M2
GLOBULIN SER-MCNC: 3.4 GM/DL (ref 2.4–3.5)
GLUCOSE SERPL-MCNC: 136 MG/DL (ref 82–115)
HCT VFR BLD AUTO: 34 % (ref 37–47)
HGB BLD-MCNC: 11 GM/DL (ref 12–16)
IMM GRANULOCYTES # BLD AUTO: 0.49 X10(3)/MCL (ref 0–0.04)
IMM GRANULOCYTES NFR BLD AUTO: 4.2 %
IRON SATN MFR SERPL: 27 % (ref 20–50)
IRON SERPL-MCNC: 66 UG/DL (ref 50–170)
LYMPHOCYTES # BLD AUTO: 2.15 X10(3)/MCL (ref 0.6–4.6)
LYMPHOCYTES NFR BLD AUTO: 18.5 %
MAGNESIUM SERPL-MCNC: 2.4 MG/DL (ref 1.6–2.6)
MCH RBC QN AUTO: 29 PG (ref 27–31)
MCHC RBC AUTO-ENTMCNC: 32.4 MG/DL (ref 33–36)
MCV RBC AUTO: 89.7 FL (ref 80–94)
MONOCYTES # BLD AUTO: 0.71 X10(3)/MCL (ref 0.1–1.3)
MONOCYTES NFR BLD AUTO: 6.1 %
NEUTROPHILS # BLD AUTO: 8.3 X10(3)/MCL (ref 2.1–9.2)
NEUTROPHILS NFR BLD AUTO: 71 %
NRBC BLD AUTO-RTO: 0 %
PHOSPHATE SERPL-MCNC: 4.1 MG/DL (ref 2.3–4.7)
PLATELET # BLD AUTO: 342 X10(3)/MCL (ref 130–400)
PMV BLD AUTO: 9.5 FL (ref 7.4–10.4)
POTASSIUM SERPL-SCNC: 4.2 MMOL/L (ref 3.5–5.1)
PREALB SERPL-MCNC: 23.5 MG/DL (ref 14–37)
PROT SERPL-MCNC: 6.5 GM/DL (ref 5.8–7.6)
RBC # BLD AUTO: 3.79 X10(6)/MCL (ref 4.2–5.4)
SODIUM SERPL-SCNC: 141 MMOL/L (ref 136–145)
TIBC SERPL-MCNC: 177 UG/DL (ref 70–310)
TIBC SERPL-MCNC: 243 UG/DL (ref 250–450)
TRANSFERRIN SERPL-MCNC: 205 MG/DL (ref 173–360)
WBC # SPEC AUTO: 11.6 X10(3)/MCL (ref 4.5–11.5)

## 2022-08-23 PROCEDURE — 97530 THERAPEUTIC ACTIVITIES: CPT

## 2022-08-23 PROCEDURE — 97110 THERAPEUTIC EXERCISES: CPT

## 2022-08-23 PROCEDURE — 84100 ASSAY OF PHOSPHORUS: CPT | Performed by: NURSE PRACTITIONER

## 2022-08-23 PROCEDURE — 97162 PT EVAL MOD COMPLEX 30 MIN: CPT

## 2022-08-23 PROCEDURE — 97535 SELF CARE MNGMENT TRAINING: CPT

## 2022-08-23 PROCEDURE — 63600175 PHARM REV CODE 636 W HCPCS: Performed by: NURSE PRACTITIONER

## 2022-08-23 PROCEDURE — 94799 UNLISTED PULMONARY SVC/PX: CPT

## 2022-08-23 PROCEDURE — 83735 ASSAY OF MAGNESIUM: CPT | Performed by: NURSE PRACTITIONER

## 2022-08-23 PROCEDURE — 80053 COMPREHEN METABOLIC PANEL: CPT | Performed by: NURSE PRACTITIONER

## 2022-08-23 PROCEDURE — 25000003 PHARM REV CODE 250: Performed by: NURSE PRACTITIONER

## 2022-08-23 PROCEDURE — 97166 OT EVAL MOD COMPLEX 45 MIN: CPT

## 2022-08-23 PROCEDURE — 36415 COLL VENOUS BLD VENIPUNCTURE: CPT | Performed by: NURSE PRACTITIONER

## 2022-08-23 PROCEDURE — 84134 ASSAY OF PREALBUMIN: CPT | Performed by: NURSE PRACTITIONER

## 2022-08-23 PROCEDURE — 82728 ASSAY OF FERRITIN: CPT | Performed by: NURSE PRACTITIONER

## 2022-08-23 PROCEDURE — 85025 COMPLETE CBC W/AUTO DIFF WBC: CPT | Performed by: NURSE PRACTITIONER

## 2022-08-23 PROCEDURE — 11800000 HC REHAB PRIVATE ROOM

## 2022-08-23 PROCEDURE — 83540 ASSAY OF IRON: CPT | Performed by: NURSE PRACTITIONER

## 2022-08-23 RX ORDER — DEXAMETHASONE 4 MG/1
4 TABLET ORAL EVERY 6 HOURS
Status: COMPLETED | OUTPATIENT
Start: 2022-08-23 | End: 2022-08-26

## 2022-08-23 RX ORDER — DEXAMETHASONE 4 MG/1
4 TABLET ORAL EVERY 12 HOURS
Status: COMPLETED | OUTPATIENT
Start: 2022-08-26 | End: 2022-08-28

## 2022-08-23 RX ORDER — ENOXAPARIN SODIUM 100 MG/ML
40 INJECTION SUBCUTANEOUS EVERY 24 HOURS
Status: DISCONTINUED | OUTPATIENT
Start: 2022-08-23 | End: 2022-08-30 | Stop reason: HOSPADM

## 2022-08-23 RX ORDER — FAMOTIDINE 20 MG/1
20 TABLET, FILM COATED ORAL 2 TIMES DAILY PRN
Status: DISCONTINUED | OUTPATIENT
Start: 2022-08-23 | End: 2022-08-30 | Stop reason: HOSPADM

## 2022-08-23 RX ORDER — LIDOCAINE HYDROCHLORIDE 20 MG/ML
15 SOLUTION OROPHARYNGEAL ONCE
Status: COMPLETED | OUTPATIENT
Start: 2022-08-23 | End: 2022-08-23

## 2022-08-23 RX ORDER — MAG HYDROX/ALUMINUM HYD/SIMETH 200-200-20
30 SUSPENSION, ORAL (FINAL DOSE FORM) ORAL ONCE
Status: COMPLETED | OUTPATIENT
Start: 2022-08-23 | End: 2022-08-23

## 2022-08-23 RX ORDER — DEXAMETHASONE 4 MG/1
4 TABLET ORAL DAILY
Status: DISCONTINUED | OUTPATIENT
Start: 2022-08-29 | End: 2022-08-30 | Stop reason: HOSPADM

## 2022-08-23 RX ORDER — DEXAMETHASONE 2 MG/1
2 TABLET ORAL DAILY
Status: DISCONTINUED | OUTPATIENT
Start: 2022-09-01 | End: 2022-08-30 | Stop reason: HOSPADM

## 2022-08-23 RX ADMIN — ALUMINA, MAGNESIA, AND SIMETHICONE ORAL SUSPENSION REGULAR STRENGTH 30 ML: 1200; 1200; 120 SUSPENSION ORAL at 12:08

## 2022-08-23 RX ADMIN — METHOCARBAMOL 500 MG: 500 TABLET ORAL at 09:08

## 2022-08-23 RX ADMIN — DOCUSATE SODIUM 100 MG: 100 CAPSULE, LIQUID FILLED ORAL at 08:08

## 2022-08-23 RX ADMIN — TRIAMTERENE AND HYDROCHLOROTHIAZIDE 1 TABLET: 37.5; 25 TABLET ORAL at 08:08

## 2022-08-23 RX ADMIN — DEXAMETHASONE 4 MG: 4 TABLET ORAL at 04:08

## 2022-08-23 RX ADMIN — HYDROCODONE BITARTRATE AND ACETAMINOPHEN 1 TABLET: 10; 325 TABLET ORAL at 09:08

## 2022-08-23 RX ADMIN — METHOCARBAMOL 500 MG: 500 TABLET ORAL at 06:08

## 2022-08-23 RX ADMIN — DULOXETINE 60 MG: 30 CAPSULE, DELAYED RELEASE ORAL at 08:08

## 2022-08-23 RX ADMIN — PANTOPRAZOLE SODIUM 40 MG: 40 TABLET, DELAYED RELEASE ORAL at 08:08

## 2022-08-23 RX ADMIN — GABAPENTIN 600 MG: 300 CAPSULE ORAL at 09:08

## 2022-08-23 RX ADMIN — METHOCARBAMOL 500 MG: 500 TABLET ORAL at 01:08

## 2022-08-23 RX ADMIN — ENOXAPARIN SODIUM 40 MG: 40 INJECTION SUBCUTANEOUS at 04:08

## 2022-08-23 RX ADMIN — NALOXEGOL OXALATE 25 MG: 25 TABLET, FILM COATED ORAL at 12:08

## 2022-08-23 RX ADMIN — LIDOCAINE HYDROCHLORIDE 15 ML: 20 SOLUTION ORAL; TOPICAL at 12:08

## 2022-08-23 RX ADMIN — DEXAMETHASONE 6 MG: 0.5 TABLET ORAL at 06:08

## 2022-08-23 RX ADMIN — GABAPENTIN 600 MG: 300 CAPSULE ORAL at 01:08

## 2022-08-23 RX ADMIN — HYDROCODONE BITARTRATE AND ACETAMINOPHEN 1 TABLET: 10; 325 TABLET ORAL at 10:08

## 2022-08-23 RX ADMIN — DEXAMETHASONE 4 MG: 4 TABLET ORAL at 10:08

## 2022-08-23 RX ADMIN — FAMOTIDINE 20 MG: 20 TABLET ORAL at 06:08

## 2022-08-23 RX ADMIN — HYDROCODONE BITARTRATE AND ACETAMINOPHEN 1 TABLET: 10; 325 TABLET ORAL at 04:08

## 2022-08-23 RX ADMIN — DEXAMETHASONE 4 MG: 4 TABLET ORAL at 12:08

## 2022-08-23 RX ADMIN — GABAPENTIN 600 MG: 300 CAPSULE ORAL at 06:08

## 2022-08-23 RX ADMIN — Medication 5000 UNITS: at 08:08

## 2022-08-23 RX ADMIN — POLYETHYLENE GLYCOL 3350 17 G: 17 POWDER, FOR SOLUTION ORAL at 08:08

## 2022-08-23 NOTE — PLAN OF CARE
Marina Dixon- age 62 *LSO brace, spinal precautions     Diagnoses: Right L2-3 hematoma, severe lumbar spinal stenosis, and radiculopathy L3-4. S/p re-do L2-3, L3-4 laminectomy, foraminotomy, and removal of postop hematoma 8/18/2022. *See chart for full and complete medical history*     Hx mental health/substance abuse: Pt. Denies history of mental health and substance abuse issues.     Insurance: [employer] [facility] [employer] insurance     Education//Work Hx: Pt. Has Bachelor's of science in Pharmacy. She has no  history. She is a retired pharmacist.      Pt. Is  to Petr Dixon ([phone]) Petr is retired. He drives, manages finances, and does yard work. He can physically assist.      Friends/Family: 1) Petr Dixon, spouse ([phone]) 2) Shadia Javier, daughter ([phone])     Power of  (No)/Living Will (No)      Prior Level of Fx: Independent ADLs, drives, cooks, does chores, does laundry, grocery shops, manages finances, and manages own meds. She does not have a medic alert.     Residence: Pt. Lives with spouse near [facility] in a single-story home with 2 steps no rails to enter the residence. She uses a walk-in shower with a 6-inch threshold and a grab bar. No HHS. She does not have an elevated toilet. Has a grab bar adjacent.     DME:  CPAP, grab bars, and a straight cane. She will use DME company approved per insurance approval.     HH/OP tx: She has a history of outpatient therapy at [facility] in [place]. She is ok with using them again. She has not had HH before. She will use HH company approved per insurance approval.      Pharmacy: [facility] in [place]/ (has prescription drug coverage)      MD(s): PCP: Dr. Kalli Sweeney ([phone]); Neurosurgery: Dr. Aquino ([phone]) Follow-up appointment 9/1/2022 9 AM; Cardiologist: Dr. Jonas     FOC obtained for DME and HH companies approved per insurance. FOC for [facility] in [place]

## 2022-08-23 NOTE — PT/OT/SLP EVAL
Physical Therapy Rehab Evaluation    Patient Name:  Marina Dixon   MRN:  [id number]    Recommendations:     Discharge Recommendations:  other (see comments) (pending progress)   Discharge Equipment Recommendations:     Barriers to discharge: None    Assessment:     Marina Dixon is a 62 y.o. female admitted with a medical diagnosis of Lumbar radiculopathy.  She presents with the following impairments/functional limitations:  weakness, impaired endurance, impaired functional mobility, gait instability, impaired balance, decreased safety awareness, pain .    Rehab Diagnosis: Right L2-3 hematoma, severe lumbar spinal stenosis, and radiculopathy L3-4. S/p re-do L2-3, L3-4 laminectomy, foraminotomy, and removal of postop hematoma 8/18/2022.     Recent Surgery: * No surgery found *      General Precautions: Standard, fall     Orthopedic Precautions: spinal precautions     Braces: LSO (OOB)    Rehab Prognosis: Good; patient would benefit from acute skilled PT services to address these deficits and reach maximum level of function.      History:     Past Medical History:   Diagnosis Date    Arthritis     Endometriosis of uterus 2002    GERD (gastroesophageal reflux disease)     Hypertension 2020    Menopause 2002    Hysterectomy    NINA (obstructive sleep apnea)     wears cpap    Sciatica        Past Surgical History:   Procedure Laterality Date    BREAST BIOPSY Right 6/27/2022    Procedure: BIOPSY, BREAST, WITH LUMPECTOMY / Right Major duct excision;  Surgeon: Sintia Red MD;  Location: [facility];  Service: General;  Laterality: Right;    CATARACT EXTRACTION W/ INTRAOCULAR LENS  IMPLANT, BILATERAL      HYSTERECTOMY  2002    LUMBAR LAMINECTOMY N/A 8/18/2022    Procedure: LAMINECTOMY, SPINE, LUMBAR;  Surgeon: Osvaldo Aquino MD;  Location: [facility] OR;  Service: Neurosurgery;  Laterality: N/A;  open L2/3, L3/4 laminectomy  drainage of fluid    SKIN BIOPSY Right     breast       Subjective     Chief Complaint:  N/A  Patient/Family Comments/goals: to be independent     Vitals   Vitals at Rest  BP     HR     O2 Sat     Pain Pain Rating 1: 5/10  Location 1: back  Pain Addressed 1: Reposition     Vitals With Activity  BP     HR     O2 Sat     Pain Pain Rating 1: 5/10  Location 1: back  Pain Addressed 1: Reposition     Respiratory Status: Room air    Patients cultural, spiritual, Caodaism conflicts given the current situation: no       Living Environment  Lives With: spouse  Name(s) of Who Lives With Patient: Gene,   Home Accessibility: stairs to enter home  Number of Stairs, Main Entrance: two  Surface of Stairs, Main Entrance: concrete  Stair Railings, Main Entrance: none  Home Layout: Able to live on 1st floor  Transportation Anticipated: family or friend will provide  Equipment Currently Used at Home: CPAP    Prior Level of Function  Ambulation Skills: independent (Pt was independent with stairs prior to admit.)  Transfer Skills: independent  ADL Skills: independent  Work/Leisure Activity: independent    Equipment used at home: none.  DME owned (not currently used): none.      Upon discharge, patient will have assistance from .    Objective:     Communicated with RN prior to session.  Patient found up in chair with peripheral IV  upon PT entry to room.    Exams  Cognitive Exam:  Patient is oriented to Person, Place, Time and Situation  Sensation:          -       WNL  Coordination:          -       WFL        Tests and Measures:      Lower Extremity Strength:    Right LE  Left LE    Knee extension: 5 Knee extension: 5   Knee flexion: 5 Knee flexion: 5   Hip flexion: 5 Hip flexion: 4   Hip extension:  Not Tested Hip extension: Not Tested   Hip abduction: 5 Hip abduction: 5   Hip adduction: 5 Hip adduction: 5   Ankle dorsiflexion: 5 Ankle dorsiflexion: 5   Ankle plantarflexion: 5 Ankle plantarflexion: 5       Functional Mobility      GGs   Current   Status    Functional Area: Care Score:    Roll Left and Right  4 HOB flat    Sit to Lying 4 HOB flat    Lying to Sitting on Side of Bed 4 HOB flat    Sit to Stand 4 LSO donned throughout, VC for proper hand placement with RW   Chair/Bed-to-Chair Transfer 4 W/c<> bed with RW   Car Transfer 4 RW   Walk 10 Feet 4    Walk 50 Feet with Two Turns 4    Walk 150 Feet 4 180' overall with RW. Overall CGA to SBA. Slowed pace. No LOB. LSO donned    Walk 10 Feet Uneven Surface 4 With RW on ramp    1 Step (Curb) 4    4 Steps 4    12 Steps 4 B rails    Picking Up Object 4 Performed with RW and reacher    Wheel 50 Feet with Two Turns 9    Wheel 150 Feet 9        Activity Tolerance good     Patient left up in chair with all lines intact, call button in reach and chair alarm on.    Education Provided: roles and goals of PT/PTA, transfer training, bed mob, gait training, stair training, safety awareness and spinal precautions    Expected compliance: High compliance    GOALS:   Multidisciplinary Problems     Physical Therapy Goals        Problem: Physical Therapy    Goal Priority Disciplines Outcome Goal Variances Interventions   Physical Therapy Goal     PT, PT/OT Ongoing, Progressing     Description: Bed Mobility:  Roll left and right independently.  Sit to supine transfer independently.  Supine to sit transfer independently.    Transfers:  Sit to stand transfer with setup/clean-up assist using RW and LRAD.  Bed to chair transfer with setup/clean-up assist using RW and LRAD.  Car transfer with setup/clean-up assist using RW and LRAD.    Mobility:  Ambulate 200 feet with setup/clean-up assist using RW and LRAD.  Ambulate 10 feet on uneven surfaces/ramps with setup/clean-up assist using RW and LRAD.  Ascend/descend a 4 inch curb with setup/clean-up assist using RW and LRAD.   Ascend/descend 12 stairs with setup/clean-up assist using bilateral handrails. ( Pt has 2 steps with out rail at home)                    Plan:     During this hospitalization, patient to be seen 5 x/week to address the  identified rehab impairments via gait training, therapeutic activities, therapeutic exercises and progress toward the following goals:     Plan of Care Expires:  08/29/22    Time Tracking:     PT Received On: 08/23/22  Time In 1100     Time Out 1200  Total Time 60 min  Therapy Time PT Received On: 08/23/22  PT Start Time: 1100  PT Stop Time: 1200  PT Total Time (min): 60 min  PT Individual: 60  Missed Time:    Time Missed due to:      Billable Minutes: Evaluation 30 and Therapeutic Activity 30    08/23/2022

## 2022-08-23 NOTE — PLAN OF CARE
Problem: Pain Acute  Goal: Acceptable Pain Control and Functional Ability  Outcome: Ongoing, Progressing  Intervention: Develop Pain Management Plan  Flowsheets (Taken 8/22/2022 2357)  Pain Management Interventions: medication offered

## 2022-08-23 NOTE — PLAN OF CARE
Problem: Occupational Therapy  Goal: Occupational Therapy Goal  Description: ADLs:  Pt to perform grooming tasks with independently and standing at sink.  Pt to perform UB dressing with set up.   Pt to perform LB dressing with set up with AE as needed   Pt to perform putting on/off footwear task with set up and AE as needed.  Pt to perform toileting with set up using toilet aide as needed.    Functional Transfers:  Pt to perform toilet transfers with set up and RW  Pt to perform a walk-in shower transfer with set up and RW    IADLs:  Pt to perform simple meal prep with supervision.    Balance, Strengthening, Endurance, Balance:  Pt to consistently demonstrate adherence to orthopedic precautions during all ADL's as instructed by OT.  Pt to demonstrate good dynamic standing balance as required to perform ADL's from standing level.  Pt to demonstrate good BUE strength during functional task   Pt to demonstrate consistent adherence to breathing control and energy conservation techniques as educated by OT.   Outcome: Ongoing, Progressing

## 2022-08-23 NOTE — DISCHARGE SUMMARY
[facility]  Neurosurgery  Discharge Summary      Patient Name: Marina Dixon  MRN: [id number]  Admission Date: 8/17/2022  Hospital Length of Stay: 5 days  Discharge Date and Time: 8/22/2022  4:31 PM  Attending Physician: Osvaldo Aquino MD  Discharging Provider: Osvaldo Aquino MD  Primary Care Provider: Kalli Blanchard MD     HPI: Ms. Marina Dixon had Right L2/3, L3/4 MIS foraminotomies with Right L2/3 discectomy on 8/15/22. She tolerated the procedure fine with no complication that day. She called the next day with some back pain and leg pain. She was given steroids with no improvement. She called again the next day reporting some issue with urgency and was encouraged to go to the ER for evaluation.     Procedure(s) (LRB):  LAMINECTOMY, SPINE, LUMBAR (N/A)     Hospital Course: Patient was evaluated in the ER on 8/17/22 for severe lower back and leg pain and numbness in the buttock area. MRI lumbar revealed a fluid collection with pressure on the thecal sac. Patient was consented and brought to the OR 8/18/22 for  Redo open (MIS) L2/3, L3/4 laminectomy, foraminotomy, removal of postoperative hematoma. Patient tolerated the procedure well and a VERA drain was left in place. On post op day 1, patient complained of some muscle spasms and cramping, VERA drain was putting out 30ml a shift, and patient was getting up with PT. On post op day 2, patient was having continued muscle spasms and her medications were changed. On post op day 3, her pain had improved some and she was able to ambulate more around her room.  Her pain was controlled by post op day 4. she was ambulating around her room and she was discharged to [facility] Orth[facility] rehab.     Consults: None    Significant Diagnostic Studies: Radiology: MRI: EXAMINATION:  MRI LUMBAR SPINE W WO CONTRAST     CLINICAL HISTORY:  recent lumbar surgery, worsening pain;     TECHNIQUE:  Multiplanar multisequence MR images of the lumbar  spine are obtained with and without contrast.     COMPARISON:  MRI of the lumbar spine dated 07/21/2022     FINDINGS:  There are 5 non-rib-bearing lumbar type vertebral bodies.  There postoperative change with prior right-sided hemilaminectomy at L2-L3 and L3-L4.  There are degenerative endplate changes at L2-L3.  The bone marrow is otherwise normal in signal.  The vertebral body heights are preserved.     The conus terminates at the level of L1.  It is normal in signal and contour.  There is a dorsal epidural fluid collection most evident at L2 through L5, measuring up to 6 mm in thickness.  There is also T2 hyperintense fluid in the dorsal thecal sac at the level of L1 and L2 measuring up to 3 mm in thickness (series 7, image 7).     Disc spaces, spinal canal and neural foramina are as follows:     L1-L2: No disc herniation.  No significant spinal canal or neural foraminal stenosis.     L2-L3: Disc bulge, facet hypertrophy and dorsal epidural fluid collection which cause severe narrowing of the spinal canal with complete effacement of the CSF space and collapse of the the cul sac.  Moderate right and mild left neural foraminal stenosis.     L3-L4: Disc bulge, facet hypertrophy and dorsal epidural fluid collection which cause severe narrowing of the spinal canal with complete effacement of the CSF space and collapse of the thecal sac.  Mild-to-moderate right and mild left neural foraminal stenosis.     L4-L5: Disc height loss with disc bulge, facet hypertrophy and dorsal epidural fluid with moderate narrowing of the spinal canal.  Mild bilateral foraminal stenosis.     L5-S1: No disc herniation.  Bilateral facet hypertrophy.  No significant spinal canal stenosis.  Mild left neural foraminal stenosis.     There is a small amount of postoperative fluid in the right-sided laminectomy beds.  Small hematoma is seen in the subcutaneous soft tissues at the level of L2-L3 measuring 11 x 32 mm in size (series 7, image  19).     Impression:     1. Severe canal stenosis at L2-L3 and L3-L4, moderate at L4-L5, with dorsal epidural fluid collection.  2. Multilevel neural foraminal stenoses as described.  3. Postoperative fluid in the right sided laminectomy beds and subcutaneous soft tissues.  No significant change from the [facility] interpretation        Electronically signed by: Shanon Birmingham  Date:                                            08/18/2022  Time:                                           08:30    Pending Diagnostic Studies:     None        Final Active Diagnoses:    Diagnosis Date Noted POA    PRINCIPAL PROBLEM:  Lumbar radiculopathy [M54.16] 08/18/2022 Yes      Problems Resolved During this Admission:      Discharged Condition: stable    Disposition: Rehab Facility    Follow Up:   Follow-up Information     Osvaldo Aquino MD Follow up on 9/1/2022.    Specialty: Neurosurgery  Why: 9:00 AM  Contact information:  [address] [place] [id number] [phone]                       Patient Instructions:      Diet Adult Regular     Lifting restrictions     Notify your health care provider if you experience any of the following:  temperature >100.4     Notify your health care provider if you experience any of the following:  redness, tenderness, or signs of infection (pain, swelling, redness, odor or green/yellow discharge around incision site)     Notify your health care provider if you experience any of the following:  severe uncontrolled pain     Remove dressing in 48 hours     Lifting restrictions     Notify your health care provider if you experience any of the following:  temperature >100.4     Notify your health care provider if you experience any of the following:  severe uncontrolled pain     Notify your health care provider if you experience any of the following:  redness, tenderness, or signs of infection (pain, swelling, redness, odor or green/yellow discharge around incision site)     Remove dressing  in 48 hours     Activity as tolerated     Medications:  Reconciled Home Medications:      Medication List      CONTINUE taking these medications    cholecalciferol (vitamin D3) 125 mcg (5,000 unit) capsule  Take 5,000 Units by mouth once daily.     docusate sodium 100 MG capsule  Commonly known as: COLACE  Take 100 mg by mouth 2 (two) times daily as needed.     DULoxetine 60 MG capsule  Commonly known as: CYMBALTA  Take 1 capsule by mouth once daily.     gabapentin 600 MG tablet  Commonly known as: NEURONTIN  Take 1,200 mg by mouth nightly.     methocarbamoL 500 MG Tab  Commonly known as: ROBAXIN  Take 500 mg by mouth 3 (three) times daily.     mupirocin 2 % ointment  Commonly known as: BACTROBAN  1 application     omeprazole 20 MG capsule  Commonly known as: PRILOSEC  Take 20 mg by mouth once daily.     pilocarpine 5 MG Tab  Commonly known as: SALAGEN  Take 10 mg by mouth 3 (three) times daily.     polyethylene glycol 17 gram/dose powder  Commonly known as: GLYCOLAX  Take 17 g by mouth daily as needed.     PREMARIN vaginal cream  Generic drug: conjugated estrogens  Place 1 g vaginally twice a week.     PROBIOTIC ACIDOPHILUS BEADS 2 billion cell Cap  Generic drug: L.acidoph,plant-B.animal,long  Take 2 capsules by mouth Daily.     traMADoL 50 mg tablet  Commonly known as: ULTRAM  Take 50 mg by mouth every 12 (twelve) hours as needed for Pain.     triamterene-hydrochlorothiazide 37.5-25 mg 37.5-25 mg per tablet  Commonly known as: MAXZIDE-25  Take 1 tablet by mouth every morning.        STOP taking these medications    indomethacin 75 mg Cpsr CR capsule  Commonly known as: INDOCIN SR     omega 3-dha-epa-fish oil 1,000 mg (120 mg-180 mg) Cap            Osvaldo Aquino MD  Neurosurgery  [facility]

## 2022-08-23 NOTE — PT/OT/SLP PROGRESS
Physical Therapy Inpatient Rehab Treatment    Patient Name:  Marina Dixon   MRN:  [id number]    Recommendations:     Discharge Recommendations:  other (see comments) (pending progress)   Discharge Equipment Recommendations:     Barriers to discharge: None    Assessment:     Marina Dixon is a 62 y.o. female admitted with a medical diagnosis of Lumbar radiculopathy.  She presents with the following impairments/functional limitations:  weakness, impaired endurance, impaired functional mobility, gait instability, impaired balance, decreased safety awareness, pain .    Rehab Diagnosis:     Recent Surgery: * No surgery found *      General Precautions: Standard, fall     Orthopedic Precautions:spinal precautions     Braces: LSO (OOB)    Rehab Prognosis: Good; patient would benefit from acute skilled PT services to address these deficits and reach maximum level of function.      History:     Past Medical History:   Diagnosis Date    Arthritis     Endometriosis of uterus 2002    GERD (gastroesophageal reflux disease)     Hypertension 2020    Menopause 2002    Hysterectomy    NINA (obstructive sleep apnea)     wears cpap    Sciatica        Past Surgical History:   Procedure Laterality Date    BREAST BIOPSY Right 6/27/2022    Procedure: BIOPSY, BREAST, WITH LUMPECTOMY / Right Major duct excision;  Surgeon: Sintia Rde MD;  Location: [facility];  Service: General;  Laterality: Right;    CATARACT EXTRACTION W/ INTRAOCULAR LENS  IMPLANT, BILATERAL      HYSTERECTOMY  2002    LUMBAR LAMINECTOMY N/A 8/18/2022    Procedure: LAMINECTOMY, SPINE, LUMBAR;  Surgeon: Osvaldo Aquino MD;  Location: [facility];  Service: Neurosurgery;  Laterality: N/A;  open L2/3, L3/4 laminectomy  drainage of fluid    SKIN BIOPSY Right     breast       Subjective     Chief Complaint: Tired   Patient/Family Comments/goals: N/A      Respiratory Status: Room air    Patients cultural, spiritual, Scientology conflicts given the current situation:  no      Objective:     Communicated with RN prior to session.  Patient found up in chair with peripheral IV  upon PT entry to room.    Pt is Oriented x3 and Alert and Motivated.    Functional Mobility:      Current   Status   Discharge   Goal   Functional Area: Care Score:     Roll Left and Right   Independent   Sit to Lying   Independent   Lying to Sitting on Side of Bed   Independent   Sit to Stand 4 With RW Independent   Chair/Bed-to-Chair Transfer 4 Recliner to w/c; w/c to toilet (Pt left on toilet and verbalized understanding to call for assist)  Independent   Car Transfer   Independent   Walk 10 Feet   Independent   Walk 50 Feet with Two Turns   Independent   Walk 150 Feet   Independent   Walk 10 Feet Uneven Surface   Set-up/clean-up   1 Step (Curb)   Independent   4 Steps   Independent   12 Steps   Independent   Picking Up Object   Independent   Wheel 50 Feet with Two Turns   Not applicable   Wheel 150 Feet   Not applicable       Therapeutic Activities and Exercises:    Seated TherX: 15 x 3 BLE 2#; Attempted w/c back support, however, pt with increased pain; rest breaks    Knee extension   Hip flexion          Activity Tolerance good     Patient left on toilet  with call button in reach.    Education provided: roles and goals of PT/PTA, transfer training, gait training and strengthening exercises    Expected compliance:    GOALS:   Multidisciplinary Problems     Physical Therapy Goals        Problem: Physical Therapy    Goal Priority Disciplines Outcome Goal Variances Interventions   Physical Therapy Goal     PT, PT/OT Ongoing, Progressing     Description: Bed Mobility:  Roll left and right independently.  Sit to supine transfer independently.  Supine to sit transfer independently.    Transfers:  Sit to stand transfer with setup/clean-up assist using RW and LRAD.  Bed to chair transfer with setup/clean-up assist using RW and LRAD.  Car transfer with setup/clean-up assist using RW and  LRAD.    Mobility:  Ambulate 200 feet with setup/clean-up assist using RW and LRAD.  Ambulate 10 feet on uneven surfaces/ramps with setup/clean-up assist using RW and LRAD.  Ascend/descend a 4 inch curb with setup/clean-up assist using RW and LRAD.   Ascend/descend 12 stairs with setup/clean-up assist using bilateral handrails. ( Pt has 2 steps with out rail at home)                    Plan:     During this hospitalization, patient to be seen 5 x/week to address the identified rehab impairments via gait training, therapeutic activities, therapeutic exercises and progress toward the following goals:     Plan of Care Expires:  08/29/22    Additional Information:         Time Tracking:     PT Received On: 08/23/22  Time In 1330     Time Out 1400  Total Time 30 min  Therapy Time PT Individual: 30  Missed Time:    Time Missed due to:      Billable Minutes: Therapeutic Activity 10 and Therapeutic Exercise 20    08/23/2022

## 2022-08-23 NOTE — CONSULTS
"Inpatient Nutrition Assessment    Admit Date: 8/22/2022   Length of Stay: 1 days    Nutrition Recommendation/Prescription     1. Continue current diet as tolerated     2. Monitor need for ONS     Nutrition Assessment     Malnutrition Assessment/Nutrition-Focused Physical Exam    Does not meet criteria     Chart Review    Reason Seen: Rehab consult    Diagnosis:  Lumbar radiculopathy s/p revision of right L2-3, L3-4 MIS with removal of hematoma on 8/18/2022    Relevant Medical History: Lumbar radiculopathy s/p laminectomy on 08/15/2022 with revision laminectomy and removal of hematoma on 08/18/2022, vitamin-D deficiency, GERD, HTN, NINA, normocytic anemia    Nutrition-Related Medications: pepcid, miralax, Vit D, diuretic   Calorie Containing IV Medications: no significant kcals from medications at this time    Nutrition-Related Labs: PAB 23.5 WNL, Glu 136(H), BUN 27.3(H), Alb 3.1(L), GFR >60     Diet/PN Order: Diet Adult Regular  Oral Supplement Order: none at this time  Tube Feeding Order: none at this time  Appetite/Oral Intake: good/% of meals  Factors Affecting Nutritional Intake: none identified at this time  Food/Evangelical/Cultural Preferences: No coffee, No milk, No chocolate     Skin Integrity: incision  Wound(s):   None documented at this time     Comments    8/23: Pt reports good appetite - enjoys the food. Per EMR is averaging ~95% po intake x last 5 meals documented. Reports chronic constipation at home, but had loose stools yesterday and BMs still soft today - refused miralax. Denies any wt loss.     Anthropometrics    Height: 5' 6" (167.6 cm) Height Method: Stated  Last Weight: 114.9 kg (253 lb 4.9 oz) (08/22/22 1700) Weight Method: Standard Scale  BMI (Calculated): 40.9  BMI Classification: obese grade III (BMI >/=40)  Ideal Body Weight (IBW), Female: 130 lb  % Ideal Body Weight, Female (lb): 194.85 %  Usual Weight Provided By: EMR weight history 111.1 kg (6/27/22)     Wt Readings from Last 5 " Encounters:   08/22/22 114.9 kg (253 lb 4.9 oz)   08/20/22 106.6 kg (235 lb) - Possible  inaccuracy in measurement   07/07/22 113.5 kg (250 lb 3.2 oz)   06/27/22 111.1 kg (244 lb 14.9 oz)   06/14/22 113.6 kg (250 lb 6.4 oz)     Weight Change(s) Since Admission:  Admit Weight: 114.9 kg (253 lb 4.9 oz) (08/22/22 1700)    Estimated Needs    Weight Used For Calorie Calculations: 114.9 kg (253 lb 4.9 oz)  Energy Calorie Requirements (kcal): 1558  Energy Need Method: [place]-St Jeor (MSJ x (1.2) SF (- 500 kcal for wt loss))  Weight Used For Protein Calculations: 59.1 kg (130 lb 4.3 oz) (IBW)  Protein Requirements: 130 g (2.2 g/kg/IBW)     Temp: 98.2 °F (36.8 °C)       Enteral Nutrition    Patient not receiving enteral nutrition at this time.    Parenteral Nutrition    Patient not receiving parenteral nutrition support at this time.    Evaluation of Received Nutrient Intake    Calories: meeting estimated needs  Protein: meeting estimated needs    Patient Education    Not applicable.    Nutrition Diagnosis     PES: No nutrition diagnosis at this time.     Interventions/Goals     Intervention(s): general/healthful diet  Goal: Meet greater than 75% of nutritional needs throughout course of stay. (new)    Monitoring & Evaluation     Dietitian will monitor food and beverage intake, energy intake, weight, weight change and electrolyte and renal panel.  Nutrition Risk/Follow-Up: low (follow-up in 5-7 days)

## 2022-08-24 PROCEDURE — 97535 SELF CARE MNGMENT TRAINING: CPT

## 2022-08-24 PROCEDURE — 94799 UNLISTED PULMONARY SVC/PX: CPT

## 2022-08-24 PROCEDURE — 97110 THERAPEUTIC EXERCISES: CPT

## 2022-08-24 PROCEDURE — 25000003 PHARM REV CODE 250: Performed by: NURSE PRACTITIONER

## 2022-08-24 PROCEDURE — 63600175 PHARM REV CODE 636 W HCPCS: Performed by: NURSE PRACTITIONER

## 2022-08-24 PROCEDURE — 97530 THERAPEUTIC ACTIVITIES: CPT

## 2022-08-24 PROCEDURE — 11800000 HC REHAB PRIVATE ROOM

## 2022-08-24 RX ORDER — POLYETHYLENE GLYCOL 3350 17 G/17G
17 POWDER, FOR SOLUTION ORAL 2 TIMES DAILY PRN
Status: DISCONTINUED | OUTPATIENT
Start: 2022-08-24 | End: 2022-08-30 | Stop reason: HOSPADM

## 2022-08-24 RX ORDER — AMLODIPINE BESYLATE 2.5 MG/1
2.5 TABLET ORAL DAILY
Status: DISCONTINUED | OUTPATIENT
Start: 2022-08-24 | End: 2022-08-25

## 2022-08-24 RX ADMIN — ACETAMINOPHEN 650 MG: 325 TABLET ORAL at 11:08

## 2022-08-24 RX ADMIN — POLYETHYLENE GLYCOL 3350 17 G: 17 POWDER, FOR SOLUTION ORAL at 10:08

## 2022-08-24 RX ADMIN — METHOCARBAMOL 500 MG: 500 TABLET ORAL at 05:08

## 2022-08-24 RX ADMIN — AMLODIPINE BESYLATE 2.5 MG: 2.5 TABLET ORAL at 09:08

## 2022-08-24 RX ADMIN — Medication 5000 UNITS: at 09:08

## 2022-08-24 RX ADMIN — DULOXETINE 60 MG: 30 CAPSULE, DELAYED RELEASE ORAL at 09:08

## 2022-08-24 RX ADMIN — DEXAMETHASONE 4 MG: 4 TABLET ORAL at 05:08

## 2022-08-24 RX ADMIN — METHOCARBAMOL 500 MG: 500 TABLET ORAL at 02:08

## 2022-08-24 RX ADMIN — DEXAMETHASONE 4 MG: 4 TABLET ORAL at 12:08

## 2022-08-24 RX ADMIN — HYDROCODONE BITARTRATE AND ACETAMINOPHEN 1 TABLET: 10; 325 TABLET ORAL at 12:08

## 2022-08-24 RX ADMIN — METHOCARBAMOL 500 MG: 500 TABLET ORAL at 08:08

## 2022-08-24 RX ADMIN — ENOXAPARIN SODIUM 40 MG: 40 INJECTION SUBCUTANEOUS at 05:08

## 2022-08-24 RX ADMIN — GABAPENTIN 600 MG: 300 CAPSULE ORAL at 05:08

## 2022-08-24 RX ADMIN — PANTOPRAZOLE SODIUM 40 MG: 40 TABLET, DELAYED RELEASE ORAL at 09:08

## 2022-08-24 RX ADMIN — NALOXEGOL OXALATE 25 MG: 25 TABLET, FILM COATED ORAL at 09:08

## 2022-08-24 RX ADMIN — GABAPENTIN 600 MG: 300 CAPSULE ORAL at 08:08

## 2022-08-24 RX ADMIN — HYDROCODONE BITARTRATE AND ACETAMINOPHEN 1 TABLET: 10; 325 TABLET ORAL at 08:08

## 2022-08-24 RX ADMIN — TRIAMTERENE AND HYDROCHLOROTHIAZIDE 1 TABLET: 37.5; 25 TABLET ORAL at 09:08

## 2022-08-24 RX ADMIN — DEXAMETHASONE 4 MG: 4 TABLET ORAL at 11:08

## 2022-08-24 RX ADMIN — GABAPENTIN 600 MG: 300 CAPSULE ORAL at 02:08

## 2022-08-24 RX ADMIN — HYDROCODONE BITARTRATE AND ACETAMINOPHEN 1 TABLET: 10; 325 TABLET ORAL at 05:08

## 2022-08-24 NOTE — PLAN OF CARE
Problem: Rehabilitation ([facility]) Plan of Care  Goal: Plan of Care Review  Outcome: Ongoing, Progressing  Flowsheets (Taken 8/24/2022 1104)  Plan of Care Reviewed With: patient  Goal: Absence of New-Onset Illness or Injury  Outcome: Ongoing, Progressing  Intervention: Prevent Fall and Fall Injury  Flowsheets (Taken 8/24/2022 1104)  Safety Promotion/Fall Prevention:   assistive device/personal item within reach   Fall Risk reviewed with patient/family   Fall Risk signage in place   high risk medications identified   lighting adjusted   medications reviewed   nonskid shoes/socks when out of bed   side rails raised x 2   instructed to call staff for mobility  Intervention: Prevent Skin Injury  Flowsheets (Taken 8/24/2022 1104)  Skin Protection: incontinence pads utilized  Intervention: Prevent Infection  Flowsheets (Taken 8/24/2022 1104)  Infection Prevention:   environmental surveillance performed   equipment surfaces disinfected   hand hygiene promoted   personal protective equipment utilized   rest/sleep promoted   single patient room provided  Intervention: Prevent VTE (Venous Thromboembolism)  Flowsheets (Taken 8/24/2022 1104)  VTE Prevention/Management:   ambulation promoted   bleeding precations maintained   bleeding risk assessed   dorsiflexion/plantar flexion performed   fluids promoted  Goal: Optimal Comfort and Wellbeing  Outcome: Ongoing, Progressing  Intervention: Provide Person-Centered Care  Flowsheets (Taken 8/24/2022 1104)  Trust Relationship/Rapport:   care explained   choices provided   questions answered   questions encouraged   reassurance provided   thoughts/feelings acknowledged  Intervention: Monitor Pain and Promote Comfort  Flowsheets (Taken 8/24/2022 1104)  Pain Management Interventions:   pain management plan reviewed with patient/caregiver   pillow support provided   position adjusted   prescribed exercises encouraged   quiet environment facilitated   relaxation techniques promoted      Problem: Fall Injury Risk  Goal: Absence of Fall and Fall-Related Injury  Outcome: Ongoing, Progressing  Intervention: Identify and Manage Contributors  Flowsheets (Taken 8/24/2022 1104)  Self-Care Promotion:   independence encouraged   BADL personal objects within reach   BADL personal routines maintained   safe use of adaptive equipment encouraged  Medication Review/Management:   medications reviewed   high-risk medications identified  Intervention: Promote Injury-Free Environment  Flowsheets (Taken 8/24/2022 1104)  Safety Promotion/Fall Prevention:   assistive device/personal item within reach   Fall Risk reviewed with patient/family   Fall Risk signage in place   high risk medications identified   lighting adjusted   medications reviewed   nonskid shoes/socks when out of bed   side rails raised x 2   instructed to call staff for mobility     Problem: Pain Acute  Goal: Acceptable Pain Control and Functional Ability  Outcome: Ongoing, Progressing  Intervention: Develop Pain Management Plan  Flowsheets (Taken 8/24/2022 1104)  Pain Management Interventions:   pain management plan reviewed with patient/caregiver   pillow support provided   position adjusted   prescribed exercises encouraged   quiet environment facilitated   relaxation techniques promoted  Intervention: Prevent or Manage Pain  Flowsheets (Taken 8/24/2022 1104)  Sleep/Rest Enhancement:   relaxation techniques promoted   regular sleep/rest pattern promoted  Medication Review/Management:   medications reviewed   high-risk medications identified  Intervention: Optimize Psychosocial Wellbeing  Flowsheets (Taken 8/24/2022 1104)  Supportive Measures:   active listening utilized   verbalization of feelings encouraged   relaxation techniques promoted   self-care encouraged

## 2022-08-24 NOTE — PT/OT/SLP PROGRESS
"Occupational Therapy Inpatient Rehab Treatment    Name: Marina Dixon  MRN: [id number]    Assessment:  Marina Dixon is a 62 y.o. female admitted with a medical diagnosis of Lumbar radiculopathy.  She presents with the following impairments/functional limitations:  weakness, impaired endurance, impaired self care skills, impaired functional mobility, impaired balance, decreased safety awareness, pain, orthopedic precautions, decreased ROM  .    General Precautions: Standard, fall     Orthopedic Precautions:spinal precautions     Braces: LSO    Rehab Prognosis: Good; patient would benefit from acute skilled OT services to address these deficits and reach maximum level of function.      History:     Past Medical History:   Diagnosis Date    Arthritis     Endometriosis of uterus 2002    GERD (gastroesophageal reflux disease)     Hypertension 2020    Menopause 2002    Hysterectomy    NINA (obstructive sleep apnea)     wears cpap    Sciatica        Past Surgical History:   Procedure Laterality Date    BREAST BIOPSY Right 6/27/2022    Procedure: BIOPSY, BREAST, WITH LUMPECTOMY / Right Major duct excision;  Surgeon: Sintia Red MD;  Location: [facility];  Service: General;  Laterality: Right;    CATARACT EXTRACTION W/ INTRAOCULAR LENS  IMPLANT, BILATERAL      HYSTERECTOMY  2002    LUMBAR LAMINECTOMY N/A 8/18/2022    Procedure: LAMINECTOMY, SPINE, LUMBAR;  Surgeon: Osvaldo Aquino MD;  Location: [facility];  Service: Neurosurgery;  Laterality: N/A;  open L2/3, L3/4 laminectomy  drainage of fluid    SKIN BIOPSY Right     breast       Subjective     Orientation: Oriented x4    Chief Complaint: stiffness    Patient/Family Comments/goals: "to get better"    Respiratory Status: Room air    Patients cultural, spiritual, [employer] conflicts given the current situation: no       Objective:     Patient found up in chair with peripheral IV  upon OT entry to room.    Mobility   Patient completed:  Sit to Stand Transfer " with independence with rolling walker  Stand to Sit Transfer with independence with rolling walker    Functional Mobility  Functional mobility room 411 to OT gym with RW with SPV ~100'    ADLs   Current Status   Oral Hygiene 6 standing at sink - pt educated on keeping walker front instead of discarding to side for safety.   Putting On, Taking Off Footwear 4 Used sock aide and shoes with elastic shoe strings and reacher     Limiting Factors for ADLs: motor, endurance, limited ROM, balance, weakness and safety awareness     IADLs: Pt educated on walker safety within the kitchen and use of walker accessories (tray, walker bags) to assist in being safe with walker. Pt able to return demonstrate retrieving items out of refrigerator and bring item from one place to another.    Therapeutic Activities Practiced donning and doffing socks and shoes with AE and with shoes with elastic shoe laces.    Therapeutic Exercise - Pt performed UBE for 5 mins forward and 5 mins backwards with 30 sec rest break between.    Patient left up in chair with call button in reach.     Education provided: Roles and goals of OT, ADLs, transfer training, assistive device, safety precautions, fall prevention and home safety    Multidisciplinary Problems     Occupational Therapy Goals        Problem: Occupational Therapy    Goal Priority Disciplines Outcome Interventions   Occupational Therapy Goal     OT, PT/OT Ongoing, Progressing    Description: ADLs:  Pt to perform grooming tasks with independently and standing at sink.  Pt to perform UB dressing with set up.   Pt to perform LB dressing with set up with AE as needed   Pt to perform putting on/off footwear task with set up and AE as needed.  Pt to perform toileting with set up using toilet aide as needed.    Functional Transfers:  Pt to perform toilet transfers with set up and RW  Pt to perform a walk-in shower transfer with set up and RW    IADLs:  Pt to perform simple meal prep with  supervision.    Balance, Strengthening, Endurance, Balance:  Pt to consistently demonstrate adherence to orthopedic precautions during all ADL's as instructed by OT.  Pt to demonstrate good dynamic standing balance as required to perform ADL's from standing level.  Pt to demonstrate good BUE strength during functional task   Pt to demonstrate consistent adherence to breathing control and energy conservation techniques as educated by OT.                    Time Tracking     OT Received On: 08/24/22  Time In 0730     Time Out 0900  Total Time 90 min  Therapy Time: OT Individual: 90  Missed Time:    Missed Time Reason:      Billable Minutes: Self Care/Home Management 75 and Therapeutic Exercise 15    08/24/2022

## 2022-08-24 NOTE — PROGRESS NOTES
[facility] ([facility])  Rehab Progress Note    Patient Name: Marina Dixon  MRN: [id number]  Age: 62 y.o. Sex: female  : 1960  Hospital Length of Stay: 2 days  Date of Service: 2022   Chief Complaint: Lumbar radiculopathy s/p revision of right L2-3, L3-4 MIS with removal of hematoma on 2022    Subjective:     Basic Information  Admit Information: 62-year-old white female presented to [facility] ED on 2022 complaining of buttocks spasms, back pain, and leg pain that worsened on  s/p right L2-3 and L3-4 MIS hemilaminectomy and diskectomy on 8/15.  Denied bowel/bladder dysfunction.  PMH significant for NINA, lumbar stenosis, and HTN.  Imaging significant for fluid collection with pressure of the thecal sac.  Tolerated revision of right L2-3, L3-4 MIS with removal of hematoma on  without perioperative complications.  Robaxin initiated without relief of muscle spasms.  Valium and Decadron initiated on  with improvement pain/spasms.  Able to mobilize on  with adjustment of medications.  Tolerated transfer to [facility] inpatient rehab unit on  without incident.  Today's Information: No acute events overnight.  Sitting comfortably in bed.  Reports good sleep and appetite.  Last BM .  BP moderately controlled.  No labs or imaging today.    Review of patient's allergies indicates:   Allergen Reactions    Sulfa (sulfonamide antibiotics) Itching and Rash        Current Facility-Administered Medications:     acetaminophen tablet 650 mg, 650 mg, Oral, Q4H PRN, Yung A Jorge Luis, FNP    ALPRAZolam tablet 0.25 mg, 0.25 mg, Oral, TID PRN, Yung A Jorge Luis, FNP    amLODIPine tablet 2.5 mg, 2.5 mg, Oral, Daily, Yung A Jorge Luis, FNP, 2.5 mg at 22 0956    benzonatate capsule 100 mg, 100 mg, Oral, TID PRN, Yung A Jorge Luis, FNP    bisacodyL suppository 10 mg, 10 mg, Rectal, Daily PRN, Yung A Jorge Luis, FNP    [START ON 2022]  dexAMETHasone tablet 2 mg, 2 mg, Oral, Daily, Yung A Jorge Luis, FNP    dexAMETHasone tablet 4 mg, 4 mg, Oral, Q6H, Yung A Jorge Luis, FNP, 4 mg at 08/24/22 1213    [START ON 8/26/2022] dexAMETHasone tablet 4 mg, 4 mg, Oral, Q12H, Yung A Jorge Luis, FNP    [START ON 8/29/2022] dexAMETHasone tablet 4 mg, 4 mg, Oral, Daily, Yung A Jorge Luis, FNP    DULoxetine DR capsule 60 mg, 60 mg, Oral, Daily, Yung A Jorge Luis, FNP, 60 mg at 08/24/22 0956    enoxaparin injection 40 mg, 40 mg, Subcutaneous, Daily, Yung A Jorge Luis, FNP, 40 mg at 08/23/22 1647    famotidine tablet 20 mg, 20 mg, Oral, BID PRN, Yung A Jorge Luis, FNP, 20 mg at 08/23/22 0630    gabapentin capsule 600 mg, 600 mg, Oral, TID, Yung A Jorge Luis, FNP, 600 mg at 08/24/22 0503    hydrALAZINE injection 10 mg, 10 mg, Intravenous, Q4H PRN, Yung A Jorge Luis, FNP    HYDROcodone-acetaminophen  mg per tablet 1 tablet, 1 tablet, Oral, Q6H PRN, Yung A Jorge Luis, FNP, 1 tablet at 08/24/22 1219    hydrOXYzine pamoate capsule 50 mg, 50 mg, Oral, Nightly PRN, Yung A Jorge Luis, FNP    labetalol 20 mg/4 mL (5 mg/mL) IV syring, 10 mg, Intravenous, Q4H PRN, Yung A Jorge Luis, FNP    methocarbamoL tablet 500 mg, 500 mg, Oral, TID, Yung A Jorge Luis, FNP, 500 mg at 08/24/22 0503    metoprolol injection 10 mg, 10 mg, Intravenous, Q2H PRN, Yung A Jorge Luis, FNP    naloxegoL (MOVANTIK) tablet 25 mg, 25 mg, Oral, Daily, Yung A Jorge Luis, FNP, 25 mg at 08/24/22 0956    nitroGLYCERIN SL tablet 0.4 mg, 0.4 mg, Sublingual, Q5 Min PRN, Yung BROCK Jorge Luis, FNP    ondansetron disintegrating tablet 4 mg, 4 mg, Oral, Q6H PRN, Yung A Jorge Luis, FNP    ondansetron disintegrating tablet 8 mg, 8 mg, Oral, Q6H PRN, Yung BROCK Jorge Luis, FNP    pantoprazole EC tablet 40 mg, 40 mg, Oral, Daily, Yung BROCK Jorge Luis, FNP, 40 mg at 08/24/22 0956    polyethylene glycol packet 17 g, 17 g, Oral, BID PRN, Yung BROCK Jorge Luis, FNP, 17 g at  "08/24/22 1001    promethazine tablet 25 mg, 25 mg, Oral, Q6H PRN, Yung Lillyart, FNP    triamterene-hydrochlorothiazide 37.5-25 mg per tablet 1 tablet, 1 tablet, Oral, Daily, Yung Kang, FNP, 1 tablet at 08/24/22 0956    vitamin D 1000 units tablet 5,000 Units, 5,000 Units, Oral, Daily, Yung Kang, FNP, 5,000 Units at 08/24/22 0956     Review of Systems   Complete 12-point review of symptoms negative except for what's mentioned in HPI     Objective:     BP (!) 157/79   Pulse 60   Temp 98.2 °F (36.8 °C) (Oral)   Resp 20   Ht 5' 6" (1.676 m)   Wt 114.9 kg (253 lb 4.9 oz)   SpO2 97%   BMI 40.89 kg/m²        Intake/Output Summary (Last 24 hours) at 8/24/2022 1256  Last data filed at 8/24/2022 0700  Gross per 24 hour   Intake 720 ml   Output --   Net 720 ml       Physical Exam  Constitutional:       Appearance: Normal appearance.   HENT:      Head: Normocephalic.      Mouth/Throat:      Mouth: Mucous membranes are moist.   Eyes:      Pupils: Pupils are equal, round, and reactive to light.   Cardiovascular:      Rate and Rhythm: Normal rate and regular rhythm.      Heart sounds: Normal heart sounds.      Comments: BL LE trace edema.  Pulmonary:      Effort: Pulmonary effort is normal.      Breath sounds: Normal breath sounds.   Abdominal:      General: Bowel sounds are normal.      Palpations: Abdomen is soft.   Musculoskeletal:      Cervical back: Neck supple.      Comments: Generalized weakness and muscle atrophy. Lumbar dressing clean and intact.  Incision looks good with no drainage.  Steri-Strips intact.    Skin:     General: Skin is warm and dry.   Neurological:      General: No focal deficit present.      Mental Status: She is alert and oriented to person, place, and time.   Psychiatric:         Mood and Affect: Mood normal.         Behavior: Behavior normal.         Thought Content: Thought content normal.         Judgment: Judgment normal.     *MD performed and documented " physical examination       Lines/Drains/Airways     None                 Labs  No results found for this or any previous visit (from the past 24 hour(s)).    Radiology  MRI lumbar spine with and without contrast on 8/17/2022, IMPRESSION: Severe canal stenosis at L2-L3 and L3-L4, moderate at L4-L5, with dorsal epidural fluid collection. Multilevel neural foraminal stenoses as described. Postoperative fluid in the right sided laminectomy beds and subcutaneous soft tissues.     Assessment/Plan:     62 y.o. WF admitted on 8/22/2022    Lumbar radiculopathy   - s/p right L2-3 and L3-4 MIS hemilaminectomy and diskectomy on 8/15  - s/p revision of right L2-3, L3-4 MIS with removal of hematoma on 8/18/2022  - continue                Lovenox 40 mg daily                Gabapentin 600 mg t.i.d.                Cymbalta 60 mg daily                 Robaxin 500 mg t.i.d.                Decadron taper               Norco 10 mg/325 mg q.4 hours p.r.n.  - defer to physiatry for rehab and pain management  - PT/OT/RT/ST following     Vitamin-D deficiency   - obtain vitamin-D level with next labs  - continue                Vitamin-D 5000 units daily     GERD  - Avoid spicy foods, and nothing to eat or drink within x2 hours of bedtime or laying flat (water is ok)   - Avoid NSAIDs (Advil, ibuprofen, naproxen...) and ramirez-2 inhibitors (Mobic, Celebrex)    - continue               Protonix 40 mg daily                Pepcid 20 mg BID prn     Constipation  - last BM 8/23  - continue                Movantik 25 mg daily (initiated 8/23)                MiraLax 17 g daily     HTN  - BP elevated  - initiate   Norvasc 2.5 mg daily  - continue               Triamterene/HCTZ 37.5 mg-25 mg daily               Hydralazine 10 mg every 2 hours as needed for BP > 160/90               Labetalol 10 mg every 2 hours as needed for BP > 160/90     NINA  - stable   - compliant with CPAP at home     Normocytic anemia  - asymptomatic  - H/H stable   - no evidence of  active bleeds  - will closely monitor and transfuse if needed      VTE Prophylaxis:  Lovenox 40 mg daily  COVID-19 testing:  Negative on 08/22/2022  COVID-19 vaccination status:  Vaccinated (Pfizer):  03/05/2021, 03/06/2021, and 12/21/2021     POA: No  Living will: No  Contacts:    Petr Dixon () [phone]                          Shadia Javier (daughter) [phone]     CODE STATUS: Full Code  Internal Medicine (attending): Roderick Serna MD  Physiatry (consulting):  Arsh Wood MD     OUTPATIENT PROVIDERS  PCP: Kalli Sweeney MD  Neuro surgery: Osvaldo Aquino MD  Cardiology: Karl Jonas MD     DISPOSITION: Condition stable.  Sleep hygiene, bowel maintenance, and appetite at goal.  Last BM 8/23.  BP moderately controlled.  No labs or imaging today.  Initiate Norvasc 2.5 mg daily.  Continue aggressive mobilization as tolerated.  Monitor closely.  Notify of acute changes.    Ravin Kang NP conducted independent physical examination and assisted with medical documentation.     Total time spent on this encounter including chart review and direct MD + NP 1-on-1 patient interaction: 46 minutes   Over 50% of this time was spent in counseling and coordination of care

## 2022-08-24 NOTE — PT/OT/SLP PROGRESS
Physical Therapy Inpatient Rehab Treatment    Patient Name:  Marina Dixon   MRN:  [id number]    Recommendations:     Discharge Recommendations:  other (see comments) (pending progress)   Discharge Equipment Recommendations:     Barriers to discharge: Pt will need one rail on steps to enter home     Assessment:     Marina Dixon is a 62 y.o. female admitted with a medical diagnosis of Lumbar radiculopathy.  She presents with the following impairments/functional limitations:  weakness, impaired endurance, impaired functional mobility, gait instability, impaired balance, decreased safety awareness, pain .    Rehab Diagnosis:     Recent Surgery: * No surgery found *      General Precautions: Standard, fall     Orthopedic Precautions:spinal precautions     Braces: LSO (OOB)    Rehab Prognosis: Good; patient would benefit from acute skilled PT services to address these deficits and reach maximum level of function.      History:     Past Medical History:   Diagnosis Date    Arthritis     Endometriosis of uterus 2002    GERD (gastroesophageal reflux disease)     Hypertension 2020    Menopause 2002    Hysterectomy    NINA (obstructive sleep apnea)     wears cpap    Sciatica        Past Surgical History:   Procedure Laterality Date    BREAST BIOPSY Right 6/27/2022    Procedure: BIOPSY, BREAST, WITH LUMPECTOMY / Right Major duct excision;  Surgeon: Sintia Red MD;  Location: [facility];  Service: General;  Laterality: Right;    CATARACT EXTRACTION W/ INTRAOCULAR LENS  IMPLANT, BILATERAL      HYSTERECTOMY  2002    LUMBAR LAMINECTOMY N/A 8/18/2022    Procedure: LAMINECTOMY, SPINE, LUMBAR;  Surgeon: Osvaldo Aquino MD;  Location: [facility];  Service: Neurosurgery;  Laterality: N/A;  open L2/3, L3/4 laminectomy  drainage of fluid    SKIN BIOPSY Right     breast       Subjective     Chief Complaint: Dull back pain   Patient/Family Comments/goals: n/a      Respiratory Status: Room air    Patients cultural, spiritual,  [employer] conflicts given the current situation: no      Objective:     Communicated with RN prior to session.  Patient found up in chair with peripheral IV  upon PT entry to room.    Pt is Oriented x3 and Alert and Motivated.    Functional Mobility:   Timed Up and Go: 26 sec with RW; 16 with out RW   5 x sit<>stand : 26 sec    Pt at increased risk for falls   Toilet t/f: overall SBA. + void   Pt performed hand hygiene with RW at sink      Current   Status   Discharge   Goal   Functional Area: Care Score:     Roll Left and Right    Independent   Sit to Lying    Independent   Lying to Sitting on Side of Bed    Independent   Sit to Stand 4 Performed with RW; LSO donned  Independent   Chair/Bed-to-Chair Transfer 4 With RW  Independent   Car Transfer    Independent   Walk 10 Feet    Independent   Walk 50 Feet with Two Turns    Independent   Walk 150 Feet    Independent   Walk 10 Feet Uneven Surface    Set-up/clean-up   1 Step (Curb)    Independent   4 Steps    Independent   12 Steps    Independent   Picking Up Object    Independent   Wheel 50 Feet with Two Turns    Not applicable   Wheel 150 Feet    Not applicable       Therapeutic Activities and Exercises:  // bars Airexfoam static standing balance with out UE support and eye close 1 min x 2 trials     Step ups in // bars on airexfoam 10 x 3 with BUE support     // bars minisquats 10 x 3     Tandem walking x 3 trials     Braiding with BUE support fwd x 3     // bars standing hip flexion 15 x 2 BLE.     Activity Tolerance good     Patient left up in chair with all lines intact, call button in reach and chair alarm on.    Education provided: roles and goals of PT/PTA, transfer training, gait training, balance training, assistive device and strengthening exercises    Expected compliance:    GOALS:   Multidisciplinary Problems     Physical Therapy Goals        Problem: Physical Therapy    Goal Priority Disciplines Outcome Goal Variances Interventions   Physical Therapy  Goal     PT, PT/OT Ongoing, Progressing     Description: Bed Mobility:  Roll left and right independently.  Sit to supine transfer independently.  Supine to sit transfer independently.    Transfers:  Sit to stand transfer with setup/clean-up assist using RW and LRAD.  Bed to chair transfer with setup/clean-up assist using RW and LRAD.  Car transfer with setup/clean-up assist using RW and LRAD.    Mobility:  Ambulate 200 feet with setup/clean-up assist using RW and LRAD.  Ambulate 10 feet on uneven surfaces/ramps with setup/clean-up assist using RW and LRAD.  Ascend/descend a 4 inch curb with setup/clean-up assist using RW and LRAD.   Ascend/descend 12 stairs with setup/clean-up assist using bilateral handrails. ( Pt has 2 steps with out rail at home)                    Plan:     During this hospitalization, patient to be seen 5 x/week to address the identified rehab impairments via gait training, therapeutic activities, therapeutic exercises and progress toward the following goals:     Plan of Care Expires:  08/29/22    Additional Information:         Time Tracking:     PT Received On: 08/24/22  Time In 1000     Time Out 1130  Total Time 90 min  Therapy Time PT Individual: 90  Missed Time:    Time Missed due to:      Billable Minutes: Gait Training 45 and Therapeutic Exercise 45    08/24/2022

## 2022-08-25 LAB
APPEARANCE UR: CLEAR
BACTERIA #/AREA URNS AUTO: ABNORMAL /HPF
BILIRUB UR QL STRIP.AUTO: NEGATIVE MG/DL
COLOR UR AUTO: YELLOW
GLUCOSE UR QL STRIP.AUTO: NEGATIVE MG/DL
KETONES UR QL STRIP.AUTO: NEGATIVE MG/DL
LEUKOCYTE ESTERASE UR QL STRIP.AUTO: ABNORMAL UNIT/L
NITRITE UR QL STRIP.AUTO: NEGATIVE
PH UR STRIP.AUTO: 7.5 [PH]
PROT UR QL STRIP.AUTO: NEGATIVE MG/DL
RBC #/AREA URNS AUTO: ABNORMAL /HPF
RBC UR QL AUTO: NEGATIVE UNIT/L
SP GR UR STRIP.AUTO: 1.01
SQUAMOUS #/AREA URNS AUTO: ABNORMAL /HPF
UROBILINOGEN UR STRIP-ACNC: 0.2 MG/DL
WBC #/AREA URNS AUTO: ABNORMAL /HPF

## 2022-08-25 PROCEDURE — 97535 SELF CARE MNGMENT TRAINING: CPT

## 2022-08-25 PROCEDURE — 94799 UNLISTED PULMONARY SVC/PX: CPT

## 2022-08-25 PROCEDURE — 97110 THERAPEUTIC EXERCISES: CPT

## 2022-08-25 PROCEDURE — 25000003 PHARM REV CODE 250: Performed by: NURSE PRACTITIONER

## 2022-08-25 PROCEDURE — 97530 THERAPEUTIC ACTIVITIES: CPT

## 2022-08-25 PROCEDURE — 97116 GAIT TRAINING THERAPY: CPT

## 2022-08-25 PROCEDURE — 11800000 HC REHAB PRIVATE ROOM

## 2022-08-25 PROCEDURE — 63600175 PHARM REV CODE 636 W HCPCS: Performed by: NURSE PRACTITIONER

## 2022-08-25 PROCEDURE — 94761 N-INVAS EAR/PLS OXIMETRY MLT: CPT

## 2022-08-25 PROCEDURE — 81001 URINALYSIS AUTO W/SCOPE: CPT | Performed by: NURSE PRACTITIONER

## 2022-08-25 RX ORDER — SODIUM CHLORIDE 9 MG/ML
INJECTION, SOLUTION INTRAVENOUS CONTINUOUS
Status: ACTIVE | OUTPATIENT
Start: 2022-08-25 | End: 2022-08-26

## 2022-08-25 RX ORDER — CIPROFLOXACIN 500 MG/1
500 TABLET ORAL EVERY 12 HOURS
Status: DISCONTINUED | OUTPATIENT
Start: 2022-08-25 | End: 2022-08-26

## 2022-08-25 RX ORDER — AMLODIPINE BESYLATE 5 MG/1
5 TABLET ORAL DAILY
Status: DISCONTINUED | OUTPATIENT
Start: 2022-08-25 | End: 2022-08-26

## 2022-08-25 RX ADMIN — TRIAMTERENE AND HYDROCHLOROTHIAZIDE 1 TABLET: 37.5; 25 TABLET ORAL at 09:08

## 2022-08-25 RX ADMIN — PANTOPRAZOLE SODIUM 40 MG: 40 TABLET, DELAYED RELEASE ORAL at 09:08

## 2022-08-25 RX ADMIN — DULOXETINE 60 MG: 30 CAPSULE, DELAYED RELEASE ORAL at 09:08

## 2022-08-25 RX ADMIN — DEXAMETHASONE 4 MG: 4 TABLET ORAL at 12:08

## 2022-08-25 RX ADMIN — GABAPENTIN 600 MG: 300 CAPSULE ORAL at 09:08

## 2022-08-25 RX ADMIN — HYDROXYZINE PAMOATE 50 MG: 50 CAPSULE ORAL at 09:08

## 2022-08-25 RX ADMIN — CIPROFLOXACIN 500 MG: 500 TABLET, FILM COATED ORAL at 09:08

## 2022-08-25 RX ADMIN — METHOCARBAMOL 500 MG: 500 TABLET ORAL at 03:08

## 2022-08-25 RX ADMIN — DEXAMETHASONE 4 MG: 4 TABLET ORAL at 06:08

## 2022-08-25 RX ADMIN — METHOCARBAMOL 500 MG: 500 TABLET ORAL at 09:08

## 2022-08-25 RX ADMIN — GABAPENTIN 600 MG: 300 CAPSULE ORAL at 03:08

## 2022-08-25 RX ADMIN — Medication 5000 UNITS: at 09:08

## 2022-08-25 RX ADMIN — AMLODIPINE BESYLATE 5 MG: 5 TABLET ORAL at 09:08

## 2022-08-25 RX ADMIN — HYDROCODONE BITARTRATE AND ACETAMINOPHEN 1 TABLET: 10; 325 TABLET ORAL at 09:08

## 2022-08-25 RX ADMIN — GABAPENTIN 600 MG: 300 CAPSULE ORAL at 05:08

## 2022-08-25 RX ADMIN — HYDROCODONE BITARTRATE AND ACETAMINOPHEN 1 TABLET: 10; 325 TABLET ORAL at 06:08

## 2022-08-25 RX ADMIN — METHOCARBAMOL 500 MG: 500 TABLET ORAL at 05:08

## 2022-08-25 RX ADMIN — ENOXAPARIN SODIUM 40 MG: 40 INJECTION SUBCUTANEOUS at 06:08

## 2022-08-25 RX ADMIN — SODIUM CHLORIDE: 9 INJECTION, SOLUTION INTRAVENOUS at 03:08

## 2022-08-25 RX ADMIN — NALOXEGOL OXALATE 25 MG: 25 TABLET, FILM COATED ORAL at 09:08

## 2022-08-25 RX ADMIN — DEXAMETHASONE 4 MG: 4 TABLET ORAL at 05:08

## 2022-08-25 NOTE — PT/OT/SLP EVAL
"Occupational Therapy Inpatient Rehab Evaluation    Name: Marina Dixon  MRN: [id number]    Recommendations:     Discharge Recommendations:  home with home health   Discharge Equipment Recommendations: shower chair, walker, rolling, bedside commode   Barriers to discharge: None    Assessment:  Marina Dixon is a 62 y.o. female admitted with a medical diagnosis of Lumbar radiculopathy.  She presents with the following impairments/functional limitations:  weakness, impaired endurance, impaired self care skills, impaired functional mobility, impaired balance, decreased safety awareness, pain, orthopedic precautions, decreased ROM  .    General Precautions: Standard, fall     Orthopedic Precautions:spinal precautions     Braces: LSO    Rehab Prognosis: Good; patient would benefit from acute skilled OT services to address these deficits and reach maximum level of function.      History:     Past Medical History:   Diagnosis Date    Arthritis     Endometriosis of uterus 2002    GERD (gastroesophageal reflux disease)     Hypertension 2020    Menopause 2002    Hysterectomy    NINA (obstructive sleep apnea)     wears cpap    Sciatica        Past Surgical History:   Procedure Laterality Date    BREAST BIOPSY Right 6/27/2022    Procedure: BIOPSY, BREAST, WITH LUMPECTOMY / Right Major duct excision;  Surgeon: Sintia Red MD;  Location: [facility];  Service: General;  Laterality: Right;    CATARACT EXTRACTION W/ INTRAOCULAR LENS  IMPLANT, BILATERAL      HYSTERECTOMY  2002    LUMBAR LAMINECTOMY N/A 8/18/2022    Procedure: LAMINECTOMY, SPINE, LUMBAR;  Surgeon: Osvaldo Aquino MD;  Location: [facility];  Service: Neurosurgery;  Laterality: N/A;  open L2/3, L3/4 laminectomy  drainage of fluid    SKIN BIOPSY Right     breast       Subjective     Orientation: Oriented x4    Chief Complaint: restless night    Patient/Family Comments/goals: "to go home with "    Vitals  Vitals at Rest  BP     HR     O2 Sat     Pain Pain " Rating 1: 2/10  Location - Orientation 1: posterior  Location 1: back  Pain Addressed 1: Reposition  Pain Rating Post-Intervention 1: 1/10     Vitals With Activity  BP     HR     O2 Sat     Pain Pain Rating 1: 2/10  Location - Orientation 1: posterior  Location 1: back  Pain Addressed 1: Reposition  Pain Rating Post-Intervention 1: 2/10     Respiratory Status: Room air    Patients cultural, spiritual, Buddhism conflicts given the current situation: no       Living Environment   Living Environment  Lives With: spouse  Name(s) of Who Lives With Patient: Petr,   Home Accessibility: stairs to enter home  Number of Stairs, Main Entrance: two  Surface of Stairs, Main Entrance: concrete  Stair Railings, Main Entrance: none  Home Layout: Able to live on 1st floor  Transportation Anticipated: family or friend will provide  Equipment Currently Used at Home: none  Shower Setup: Walk-in shower    Prior Level of Function  BADL: Independent    IADL: Independent    Equipment used at home: CPAP.  DME owned (not currently used): none.      Upon discharge, patient will have assistance from .    Objective:     Patient found up in chair with peripheral IV  upon OT entry to room.    Mobility   Patient completed:  Sit to Stand Transfer with supervision with rolling walker  Stand to Sit Transfer with supervision with rolling walker  Toilet Transfer Stand Pivot and Step Transfer technique with contact guard assistance with  rolling walker  Tub Transfer Stand Pivot and Step Transfer technique with contact guard assistance with rolling walker    Functional Mobility   In room FM bedside chair to bathroom with RW CGA.    ADLs     Current Status   Eating  6   Oral Hygiene  6 seated   Shower, Bathe Self  5   Upper Body Dressing  5   Lower Body Dressing  4   Toileting Hygiene  5   Toilet Transfer  5   Putting On, Taking Off Footwear  5     Limiting Factors for ADLs: motor, endurance, balance, weakness and safety awareness    Exams      ROM:          -       WFL    Hand Dominance: Right    ROM Hand  Left Hand: WFL  Right Hand: WFL    Strength  Overall Strength:          -       WFL, except B shoulders limited to 3+/5     Strength:   WFL    Sensation  Left:          -       WNL  Right:          -       WNL    Coordination:      -       Intact    Tone  Left: WNL  Right: WNL    Visual/Perceptual  Intact    Cognition:   WFL    Balance    Sitting  Sitting Surface: TTB  Static: No UE Support, Good (-)  Dynamic: No UE extremity support, Good (-)    Standing  Static: No UE Support, Fair (+)  Dynamic: No UE extremity support, Fair (-)    Righting Reaction:   WNL    Posture/Deviations  WNL    Patient left up in chair with call button in reach and chair alarm on.    Education provided: Roles and goals of OT, ADLs, transfer training, bed mobility, assistive device, safety precautions, fall prevention and home safety    Multidisciplinary Problems     Occupational Therapy Goals        Problem: Occupational Therapy    Goal Priority Disciplines Outcome Interventions   Occupational Therapy Goal     OT, PT/OT Ongoing, Progressing    Description: ADLs:  Pt to perform grooming tasks with independently and standing at sink.  Pt to perform UB dressing with set up.   Pt to perform LB dressing with set up with AE as needed   Pt to perform putting on/off footwear task with set up and AE as needed.  Pt to perform toileting with set up using toilet aide as needed.    Functional Transfers:  Pt to perform toilet transfers with set up and RW  Pt to perform a walk-in shower transfer with set up and RW    IADLs:  Pt to perform simple meal prep with supervision.    Balance, Strengthening, Endurance, Balance:  Pt to consistently demonstrate adherence to orthopedic precautions during all ADL's as instructed by OT.  Pt to demonstrate good dynamic standing balance as required to perform ADL's from standing level.  Pt to demonstrate good BUE strength during functional task   Pt  to demonstrate consistent adherence to breathing control and energy conservation techniques as educated by OT.                    Plan     During this hospitalization, patient to be seen 5 x/week to address the identified rehab impairments via self-care/home management, community/work re-entry, therapeutic activities, therapeutic exercises and progress toward the following goals:    Plan of Care Expires:  08/29/22    Time Tracking     OT Received On:    Time In  0730     Time Out  0900  Total Time  90  Therapy Time:  90  Missed Time:    Missed Time Reason:      Billable Minutes: Evaluation 30 and Self Care/Home Management 60    08/25/2022

## 2022-08-25 NOTE — PT/OT/SLP PROGRESS
Physical Therapy Inpatient Rehab Treatment    Patient Name:  Marina Dixon   MRN:  [id number]    Recommendations:     Discharge Recommendations:  other (see comments) (pending progress)   Discharge Equipment Recommendations:     Barriers to discharge: None    Assessment:     Marina Dixon is a 62 y.o. female admitted with a medical diagnosis of Lumbar radiculopathy.  She presents with the following impairments/functional limitations:  weakness, impaired endurance, impaired functional mobility, gait instability, impaired balance, decreased safety awareness, pain .    Rehab Diagnosis:     Recent Surgery: * No surgery found *      General Precautions: Standard, fall     Orthopedic Precautions:spinal precautions     Braces: LSO    Rehab Prognosis: Good; patient would benefit from acute skilled PT services to address these deficits and reach maximum level of function.      History:     Past Medical History:   Diagnosis Date    Arthritis     Endometriosis of uterus 2002    GERD (gastroesophageal reflux disease)     Hypertension 2020    Menopause 2002    Hysterectomy    NINA (obstructive sleep apnea)     wears cpap    Sciatica        Past Surgical History:   Procedure Laterality Date    BREAST BIOPSY Right 6/27/2022    Procedure: BIOPSY, BREAST, WITH LUMPECTOMY / Right Major duct excision;  Surgeon: Sintia Red MD;  Location: [facility];  Service: General;  Laterality: Right;    CATARACT EXTRACTION W/ INTRAOCULAR LENS  IMPLANT, BILATERAL      HYSTERECTOMY  2002    LUMBAR LAMINECTOMY N/A 8/18/2022    Procedure: LAMINECTOMY, SPINE, LUMBAR;  Surgeon: Osvaldo Aquino MD;  Location: [facility];  Service: Neurosurgery;  Laterality: N/A;  open L2/3, L3/4 laminectomy  drainage of fluid    SKIN BIOPSY Right     breast       Subjective     Chief Complaint: Im tired   Patient/Family Comments/goals: N/A      Respiratory Status: Room air    Patients cultural, spiritual, Synagogue conflicts given the current situation:  no      Objective:     Communicated with RN prior to session.  Patient found up in chair with peripheral IV  upon PT entry to room.    Pt is Oriented x3 and Alert.    Functional Mobility:    toilet t/f: + void overall CGA      Current   Status   Discharge   Goal   Functional Area: Care Score:     Roll Left and Right    Independent   Sit to Lying 4  Independent   Lying to Sitting on Side of Bed    Independent   Sit to Stand 4  Independent   Chair/Bed-to-Chair Transfer 4 VC for proper tech with RW and safety concerns noted today. Pt appears to have slow processing and slightly dazed. Independent   Car Transfer    Independent   Walk 10 Feet 4  Independent   Walk 50 Feet with Two Turns 4  Independent   Walk 150 Feet 4 Overall CGA to SBA  For safety with RW. LSO donned. 180' and another 300' with seated rest breaks between bouts.  Independent   Walk 10 Feet Uneven Surface    Set-up/clean-up   1 Step (Curb) 4 With RW  Independent   4 Steps   Independent   12 Steps    Independent   Picking Up Object    Independent   Wheel 50 Feet with Two Turns    Not applicable   Wheel 150 Feet    Not applicable       Therapeutic Activities and Exercises:      Activity Tolerance    Patient left supine with all lines intact and call button in reach.    Education provided: roles and goals of PT/PTA, transfer training, bed mob, safety awareness and assistive device    Expected compliance:    GOALS:   Multidisciplinary Problems     Physical Therapy Goals        Problem: Physical Therapy    Goal Priority Disciplines Outcome Goal Variances Interventions   Physical Therapy Goal     PT, PT/OT Ongoing, Progressing     Description: Bed Mobility:  Roll left and right independently.  Sit to supine transfer independently.  Supine to sit transfer independently.    Transfers:  Sit to stand transfer with setup/clean-up assist using RW and LRAD.  Bed to chair transfer with setup/clean-up assist using RW and LRAD.  Car transfer with setup/clean-up assist  using RW and LRAD.    Mobility:  Ambulate 200 feet with setup/clean-up assist using RW and LRAD.  Ambulate 10 feet on uneven surfaces/ramps with setup/clean-up assist using RW and LRAD.  Ascend/descend a 4 inch curb with setup/clean-up assist using RW and LRAD.   Ascend/descend 12 stairs with setup/clean-up assist using bilateral handrails. ( Pt has 2 steps with out rail at home)                    Plan:     During this hospitalization, patient to be seen 5 x/week to address the identified rehab impairments via gait training, therapeutic activities, therapeutic exercises and progress toward the following goals:     Plan of Care Expires:  08/29/22    Additional Information:         Time Tracking:     PT Received On: 08/25/22  Time In 1430     Time Out 1500  Total Time 30 min  Therapy Time PT Individual: 30  Missed Time:    Time Missed due to:      Billable Minutes: Gait Training 15 and Therapeutic Activity 15    08/25/2022

## 2022-08-25 NOTE — PROGRESS NOTES
[facility] ([facility])  Rehab Progress Note    Patient Name: Marina Dixon  MRN: [id number]  Age: 62 y.o. Sex: female  : 1960  Hospital Length of Stay: 3 days  Date of Service: 2022   Chief Complaint: Lumbar radiculopathy s/p revision of right L2-3, L3-4 MIS with removal of hematoma on 2022    Subjective:     Basic Information  Admit Information: 62-year-old white female presented to [facility] ED on 2022 complaining of buttocks spasms, back pain, and leg pain that worsened on  s/p right L2-3 and L3-4 MIS hemilaminectomy and diskectomy on 8/15.  Denied bowel/bladder dysfunction.  PMH significant for NINA, lumbar stenosis, and HTN.  Imaging significant for fluid collection with pressure of the thecal sac.  Tolerated revision of right L2-3, L3-4 MIS with removal of hematoma on  without perioperative complications.  Robaxin initiated without relief of muscle spasms.  Valium and Decadron initiated on  with improvement pain/spasms.  Able to mobilize on  with adjustment of medications.  Tolerated transfer to [facility] inpatient rehab unit on  without incident.  Today's Information: No acute events overnight.  Sitting up in chair.  Reports good sleep and appetite.  Last BM .  BP moderately controlled.  Complains of dysuria.  No labs or imaging today.    Review of patient's allergies indicates:   Allergen Reactions    Sulfa (sulfonamide antibiotics) Itching and Rash        Current Facility-Administered Medications:     0.9%  NaCl infusion, , Intravenous, Continuous, Yung A Jorge Luis, FNP    acetaminophen tablet 650 mg, 650 mg, Oral, Q4H PRN, Yung A Jorge Luis, FNP, 650 mg at 22 2348    ALPRAZolam tablet 0.25 mg, 0.25 mg, Oral, TID PRN, Yung A Jorge Luis, FNP    amLODIPine tablet 5 mg, 5 mg, Oral, Daily, Yung A Jorge Luis, FNP, 5 mg at 22 0912    benzonatate capsule 100 mg, 100 mg, Oral, TID PRN, Yung A Jorge Luis, FNP     bisacodyL suppository 10 mg, 10 mg, Rectal, Daily PRN, Yung A Jorge Luis, FNP    [START ON 9/1/2022] dexAMETHasone tablet 2 mg, 2 mg, Oral, Daily, Yung A Jorge Luis, FNP    dexAMETHasone tablet 4 mg, 4 mg, Oral, Q6H, Yung A Jorge Luis, FNP, 4 mg at 08/25/22 0543    [START ON 8/26/2022] dexAMETHasone tablet 4 mg, 4 mg, Oral, Q12H, Yung A Jorge Luis, FNP    [START ON 8/29/2022] dexAMETHasone tablet 4 mg, 4 mg, Oral, Daily, Yung A Jorge Luis, FNP    DULoxetine DR capsule 60 mg, 60 mg, Oral, Daily, Yung A Jorge Luis, FNP, 60 mg at 08/25/22 0912    enoxaparin injection 40 mg, 40 mg, Subcutaneous, Daily, Yung A Jorge Luis, FNP, 40 mg at 08/24/22 1715    famotidine tablet 20 mg, 20 mg, Oral, BID PRN, Yung A Jorge Luis, FNP, 20 mg at 08/23/22 0630    gabapentin capsule 600 mg, 600 mg, Oral, TID, Yung A Jorge Luis, FNP, 600 mg at 08/25/22 0543    hydrALAZINE injection 10 mg, 10 mg, Intravenous, Q4H PRN, Yung A Jorge Luis, FNP    HYDROcodone-acetaminophen  mg per tablet 1 tablet, 1 tablet, Oral, Q6H PRN, Ynug A Jorge Luis, FNP, 1 tablet at 08/25/22 0912    hydrOXYzine pamoate capsule 50 mg, 50 mg, Oral, Nightly PRN, Yung A Jorge Luis, FNP    labetalol 20 mg/4 mL (5 mg/mL) IV syring, 10 mg, Intravenous, Q4H PRN, Yung A Jorge Luis, FNP    methocarbamoL tablet 500 mg, 500 mg, Oral, TID, Yung A Jorge Luis, FNP, 500 mg at 08/25/22 0543    metoprolol injection 10 mg, 10 mg, Intravenous, Q2H PRN, Yung A Jorge Luis, FNP    naloxegoL (MOVANTIK) tablet 25 mg, 25 mg, Oral, Daily, Yung A Jorge Luis, FNP, 25 mg at 08/25/22 0912    nitroGLYCERIN SL tablet 0.4 mg, 0.4 mg, Sublingual, Q5 Min PRN, Yung BROCK Jorge Luis, FNP    ondansetron disintegrating tablet 4 mg, 4 mg, Oral, Q6H PRN, Yung VINOD Jorge Luis, FNP    ondansetron disintegrating tablet 8 mg, 8 mg, Oral, Q6H PRN, Yung Collinsehart, FNP    pantoprazole EC tablet 40 mg, 40 mg, Oral, Daily, Yung A Jorge Luis, FNP, 40 mg at  "08/25/22 0912    polyethylene glycol packet 17 g, 17 g, Oral, BID PRN, Yung Kang, XIMENAP, 17 g at 08/24/22 1001    promethazine tablet 25 mg, 25 mg, Oral, Q6H PRN, Yung Kang, FNP    triamterene-hydrochlorothiazide 37.5-25 mg per tablet 1 tablet, 1 tablet, Oral, Daily, Yung Kang, FNP, 1 tablet at 08/25/22 0912    vitamin D 1000 units tablet 5,000 Units, 5,000 Units, Oral, Daily, Yung Lillyart, FNP, 5,000 Units at 08/25/22 0912     Review of Systems   Complete 12-point review of symptoms negative except for what's mentioned in HPI     Objective:     BP (!) 154/83   Pulse (!) 59   Temp 98.2 °F (36.8 °C) (Oral)   Resp 18   Ht 5' 6" (1.676 m)   Wt 114.9 kg (253 lb 4.9 oz)   SpO2 97%   BMI 40.89 kg/m²        Intake/Output Summary (Last 24 hours) at 8/25/2022 1105  Last data filed at 8/25/2022 0800  Gross per 24 hour   Intake 840 ml   Output --   Net 840 ml     Physical Exam  Constitutional:       Appearance: Normal appearance.   HENT:      Head: Normocephalic.      Mouth/Throat:      Mouth: Mucous membranes are moist.   Eyes:      Pupils: Pupils are equal, round, and reactive to light.   Cardiovascular:      Rate and Rhythm: Normal rate and regular rhythm.      Heart sounds: Normal heart sounds.      Comments: BL LE trace edema.  Pulmonary:      Effort: Pulmonary effort is normal.      Breath sounds: Normal breath sounds.   Abdominal:      General: Bowel sounds are normal.      Palpations: Abdomen is soft.   Musculoskeletal:      Cervical back: Neck supple.      Comments: Generalized weakness and muscle atrophy. Lumbar dressing clean and intact.  Incision looks good with no drainage.  Steri-Strips intact.    Skin:     General: Skin is warm and dry.   Neurological:      General: No focal deficit present.      Mental Status: She is alert and oriented to person, place, and time.   Psychiatric:         Mood and Affect: Mood normal.         Behavior: Behavior normal.         Thought " Content: Thought content normal.         Judgment: Judgment normal.     *MD performed and documented physical examination       Lines/Drains/Airways     None             Labs  No results found for this or any previous visit (from the past 24 hour(s)).    Radiology  MRI lumbar spine with and without contrast on 8/17/2022, IMPRESSION: Severe canal stenosis at L2-L3 and L3-L4, moderate at L4-L5, with dorsal epidural fluid collection. Multilevel neural foraminal stenoses as described. Postoperative fluid in the right sided laminectomy beds and subcutaneous soft tissues.     Assessment/Plan:     62 y.o. WF admitted on 8/22/2022    Lumbar radiculopathy   - s/p right L2-3 and L3-4 MIS hemilaminectomy and diskectomy on 8/15  - s/p revision of right L2-3, L3-4 MIS with removal of hematoma on 8/18/2022  - continue                Lovenox 40 mg daily                Gabapentin 600 mg t.i.d.                Cymbalta 60 mg daily                 Robaxin 500 mg t.i.d.                Decadron taper                Norco 10 mg/325 mg q.4 hours p.r.n.  - defer to physiatry for rehab and pain management  - PT/OT/RT/ST following     Vitamin-D deficiency   - obtain vitamin-D level with next labs  - continue                Vitamin-D 5000 units daily     GERD  - Avoid spicy foods, and nothing to eat or drink within x2 hours of bedtime or laying flat (water is ok)   - Avoid NSAIDs (Advil, ibuprofen, naproxen...) and ramirez-2 inhibitors (Mobic, Celebrex)    - continue               Protonix 40 mg daily                Pepcid 20 mg BID prn     Constipation  - last BM 8/24  - continue                Movantik 25 mg daily (initiated 8/23)                MiraLax 17 g daily     HTN  - BP elevated  - continue   Norvasc 5 mg daily (increased 8/25)               Triamterene/HCTZ 37.5 mg-25 mg daily               Hydralazine 10 mg every 2 hours as needed for BP > 160/90               Labetalol 10 mg every 2 hours as needed for BP > 160/90     NINA  - stable    - compliant with CPAP at home     Normocytic anemia  - asymptomatic  - H/H stable   - no evidence of active bleeds  - will closely monitor and transfuse if needed     Dysuria  - current  - obtain UA  - initiate   NS 75 mL/HR x1 L    Cipro 500 mg b.i.d. (initiated 8/25)     VTE Prophylaxis:  Lovenox 40 mg daily  COVID-19 testing:  Negative on 08/22/2022  COVID-19 vaccination status:  Vaccinated (Pfizer):  03/05/2021, 03/06/2021, and 12/21/2021     POA: No  Living will: No  Contacts:    Petr Dixon () [phone]                      Shadia Javier (daughter) [phone]     CODE STATUS: Full Code  Internal Medicine (attending): Roderick Serna MD  Physiatry (consulting):  Arsh Wood MD     OUTPATIENT PROVIDERS  PCP: Kalli Sweeney MD  Neuro surgery: Osvaldo Aquino MD  Cardiology: Karl Jonas MD     DISPOSITION: Condition stable.  Sleep hygiene, bowel maintenance, and appetite at goal.  Last BM 8/24.  Complains of dysuria. BP moderately controlled.  No labs or imaging today.  Increase Norvasc 5 mg daily.  Initiate NS 75 mL/h x 1 liter.  Obtain UA and initiate Cipro 500 mg BID.  Continue aggressive mobilization as tolerated.  Monitor closely.  Notify of acute changes.    Ravin Kang NP conducted independent physical examination and assisted with medical documentation.     Total time spent on this encounter including chart review and direct MD + NP 1-on-1 patient interaction: 44 minutes   Over 50% of this time was spent in counseling and coordination of care

## 2022-08-25 NOTE — PLAN OF CARE
Problem: Rehabilitation ([facility]) Plan of Care  Goal: Plan of Care Review  Outcome: Ongoing, Progressing  Goal: Patient-Specific Goal (Individualized)  Outcome: Ongoing, Progressing  Goal: Absence of New-Onset Illness or Injury  Outcome: Ongoing, Progressing  Goal: Optimal Comfort and Wellbeing  Outcome: Ongoing, Progressing  Intervention: Provide Person-Centered Care  Flowsheets (Taken 8/24/2022 2318)  Trust Relationship/Rapport:   care explained   choices provided   questions encouraged   questions answered   emotional support provided   reassurance provided   empathic listening provided   thoughts/feelings acknowledged  Intervention: Monitor Pain and Promote Comfort  Flowsheets (Taken 8/24/2022 2318)  Pain Management Interventions:   awakened for pain meds per patient request   medication offered   breathing exercises utilized   care clustered   position adjusted   pillow support provided  Goal: Readiness for Transition of Care  Outcome: Ongoing, Progressing     Problem: Bariatric Environmental Safety  Goal: Safety Maintained with Care  Outcome: Ongoing, Progressing     Problem: Fall Injury Risk  Goal: Absence of Fall and Fall-Related Injury  Outcome: Ongoing, Progressing     Problem: Pain Acute  Goal: Acceptable Pain Control and Functional Ability  Outcome: Ongoing, Progressing

## 2022-08-25 NOTE — PT/OT/SLP PROGRESS
Physical Therapy Inpatient Rehab Treatment    Patient Name:  Marina Dixon   MRN:  [id number]    Recommendations:     Discharge Recommendations:  other (see comments) (pending progress)   Discharge Equipment Recommendations:     Barriers to discharge: None    Assessment:     Marina Dixon is a 62 y.o. female admitted with a medical diagnosis of Lumbar radiculopathy.  She presents with the following impairments/functional limitations:  impaired endurance, impaired functional mobility, impaired balance, pain, gait instability.    Rehab Diagnosis:     Recent Surgery: * No surgery found *      General Precautions: Standard, fall     Orthopedic Precautions:spinal precautions     Braces: LSO (OOB)    Rehab Prognosis: Good; patient would benefit from acute skilled PT services to address these deficits and reach maximum level of function.      History:     Past Medical History:   Diagnosis Date    Arthritis     Endometriosis of uterus 2002    GERD (gastroesophageal reflux disease)     Hypertension 2020    Menopause 2002    Hysterectomy    NINA (obstructive sleep apnea)     wears cpap    Sciatica        Past Surgical History:   Procedure Laterality Date    BREAST BIOPSY Right 6/27/2022    Procedure: BIOPSY, BREAST, WITH LUMPECTOMY / Right Major duct excision;  Surgeon: Sintia Red MD;  Location: [facility];  Service: General;  Laterality: Right;    CATARACT EXTRACTION W/ INTRAOCULAR LENS  IMPLANT, BILATERAL      HYSTERECTOMY  2002    LUMBAR LAMINECTOMY N/A 8/18/2022    Procedure: LAMINECTOMY, SPINE, LUMBAR;  Surgeon: Osvaldo Aquino MD;  Location: [facility];  Service: Neurosurgery;  Laterality: N/A;  open L2/3, L3/4 laminectomy  drainage of fluid    SKIN BIOPSY Right     breast       Subjective     Chief Complaint: Soreness in Upper back  Patient/Family Comments/goals: N/A    Vitals   Vitals at Rest  /80   HR    O2 Sat    Pain 4/10; soreness in upper back     Vitals With Activity  /78   HR    O2 Sat    Pain 3/10  "    Respiratory Status: Room air    Patients cultural, spiritual, Confucianist conflicts given the current situation: no      Objective:     Communicated with RN prior to session.  Patient found up in chair with all needs within reach upon PT entry to room.    Pt is Oriented x3 and Alert, Cooperative and Motivated.    Functional Mobility:        Current   Status   Discharge   Goal   Functional Area: Care Score:     Roll Left and Right    Independent   Sit to Lying    Independent   Lying to Sitting on Side of Bed    Independent   Sit to Stand 4 Performed with RW; LSO donned   Independent   Chair/Bed-to-Chair Transfer 4 With RW Independent   Car Transfer    Independent   Walk 10 Feet    Independent   Walk 50 Feet with Two Turns    Independent   Walk 150 Feet    Independent   Walk 10 Feet Uneven Surface    Set-up/clean-up   1 Step (Curb) 4  Independent   4 Steps 4 Pt reported 2 steps to enter her house with one railing;  performed stair negotiation with one rail and BUE support utilizing a side step maneuver with SBA Independent   12 Steps    Independent   Picking Up Object    Independent   Wheel 50 Feet with Two Turns    Not applicable   Wheel 150 Feet    Not applicable       Therapeutic Activities and Exercises:    Seated TherX: 3 x 15 in w/c with 2# BLE   Knee extension    Hip flexion    Hip adduction with ball    Hip abduction with Theraband      // bars AirexFoam Static standing Balance Training without UE support EC 1 min and 30 sec x 3    // bars SLS on AirexFoam EO without UE support 30" x 2   -Pt demonstrated moderate forward sway when performing SLS on RLE; utilized LUE support when performing exercise on RLE    // bars Airexfoam step ups with BUE support 2 x 15     // bars Airexfoam lateral step ups with BUE support 2 x 15    Recumbent Bike for 15 min with 1.2 resistance      Activity Tolerance Good    Patient left  with OT  with  OT present.    Education provided: roles and goals of PT/PTA, stair training, " safety awareness, body mechanics, assistive device, strengthening exercises and spinal precautions    Expected compliance:    GOALS:   Multidisciplinary Problems       Physical Therapy Goals          Problem: Physical Therapy    Goal Priority Disciplines Outcome Goal Variances Interventions   Physical Therapy Goal     PT, PT/OT Ongoing, Progressing     Description: Bed Mobility:  Roll left and right independently.  Sit to supine transfer independently.  Supine to sit transfer independently.    Transfers:  Sit to stand transfer with setup/clean-up assist using RW and LRAD.  Bed to chair transfer with setup/clean-up assist using RW and LRAD.  Car transfer with setup/clean-up assist using RW and LRAD.    Mobility:  Ambulate 200 feet with setup/clean-up assist using RW and LRAD.  Ambulate 10 feet on uneven surfaces/ramps with setup/clean-up assist using RW and LRAD.  Ascend/descend a 4 inch curb with setup/clean-up assist using RW and LRAD.   Ascend/descend 12 stairs with setup/clean-up assist using bilateral handrails. ( Pt has 2 steps with out rail at home)                        Plan:     During this hospitalization, patient to be seen 5 x/week to address the identified rehab impairments via gait training, therapeutic activities, therapeutic exercises and progress toward the following goals:    Plan of Care Expires:  08/29/22    Additional Information:         Time Tracking:     PT Received On: 08/25/22  Time In 0930     Time Out 1100  Total Time 90 min  Therapy Time PT Individual: 90  Missed Time:    Time Missed due to:      Billable Minutes: Therapeutic Activity 60 and Therapeutic Exercise 30    08/25/2022

## 2022-08-26 PROCEDURE — 94761 N-INVAS EAR/PLS OXIMETRY MLT: CPT

## 2022-08-26 PROCEDURE — 25000003 PHARM REV CODE 250: Performed by: NURSE PRACTITIONER

## 2022-08-26 PROCEDURE — 97110 THERAPEUTIC EXERCISES: CPT

## 2022-08-26 PROCEDURE — 11800000 HC REHAB PRIVATE ROOM

## 2022-08-26 PROCEDURE — 94799 UNLISTED PULMONARY SVC/PX: CPT

## 2022-08-26 PROCEDURE — 97535 SELF CARE MNGMENT TRAINING: CPT

## 2022-08-26 PROCEDURE — 63600175 PHARM REV CODE 636 W HCPCS: Performed by: NURSE PRACTITIONER

## 2022-08-26 PROCEDURE — 97530 THERAPEUTIC ACTIVITIES: CPT

## 2022-08-26 PROCEDURE — 97116 GAIT TRAINING THERAPY: CPT

## 2022-08-26 RX ORDER — CIPROFLOXACIN 500 MG/1
500 TABLET ORAL EVERY 12 HOURS
Status: COMPLETED | OUTPATIENT
Start: 2022-08-26 | End: 2022-08-28

## 2022-08-26 RX ORDER — AMLODIPINE BESYLATE 5 MG/1
5 TABLET ORAL ONCE
Status: COMPLETED | OUTPATIENT
Start: 2022-08-26 | End: 2022-08-26

## 2022-08-26 RX ORDER — AMLODIPINE BESYLATE 5 MG/1
10 TABLET ORAL DAILY
Status: DISCONTINUED | OUTPATIENT
Start: 2022-08-27 | End: 2022-08-30 | Stop reason: HOSPADM

## 2022-08-26 RX ADMIN — DEXAMETHASONE 4 MG: 4 TABLET ORAL at 05:08

## 2022-08-26 RX ADMIN — CIPROFLOXACIN 500 MG: 500 TABLET, FILM COATED ORAL at 08:08

## 2022-08-26 RX ADMIN — METHOCARBAMOL 500 MG: 500 TABLET ORAL at 03:08

## 2022-08-26 RX ADMIN — TRIAMTERENE AND HYDROCHLOROTHIAZIDE 1 TABLET: 37.5; 25 TABLET ORAL at 08:08

## 2022-08-26 RX ADMIN — DEXAMETHASONE 4 MG: 4 TABLET ORAL at 08:08

## 2022-08-26 RX ADMIN — AMLODIPINE BESYLATE 5 MG: 5 TABLET ORAL at 08:08

## 2022-08-26 RX ADMIN — METHOCARBAMOL 500 MG: 500 TABLET ORAL at 05:08

## 2022-08-26 RX ADMIN — Medication 5000 UNITS: at 08:08

## 2022-08-26 RX ADMIN — AMLODIPINE BESYLATE 5 MG: 5 TABLET ORAL at 12:08

## 2022-08-26 RX ADMIN — GABAPENTIN 600 MG: 300 CAPSULE ORAL at 03:08

## 2022-08-26 RX ADMIN — ENOXAPARIN SODIUM 40 MG: 40 INJECTION SUBCUTANEOUS at 05:08

## 2022-08-26 RX ADMIN — DULOXETINE 60 MG: 30 CAPSULE, DELAYED RELEASE ORAL at 08:08

## 2022-08-26 RX ADMIN — HYDROCODONE BITARTRATE AND ACETAMINOPHEN 1 TABLET: 10; 325 TABLET ORAL at 12:08

## 2022-08-26 RX ADMIN — NALOXEGOL OXALATE 25 MG: 25 TABLET, FILM COATED ORAL at 08:08

## 2022-08-26 RX ADMIN — METHOCARBAMOL 500 MG: 500 TABLET ORAL at 09:08

## 2022-08-26 RX ADMIN — GABAPENTIN 600 MG: 300 CAPSULE ORAL at 09:08

## 2022-08-26 RX ADMIN — PANTOPRAZOLE SODIUM 40 MG: 40 TABLET, DELAYED RELEASE ORAL at 08:08

## 2022-08-26 RX ADMIN — DEXAMETHASONE 4 MG: 4 TABLET ORAL at 12:08

## 2022-08-26 RX ADMIN — HYDROCODONE BITARTRATE AND ACETAMINOPHEN 1 TABLET: 10; 325 TABLET ORAL at 05:08

## 2022-08-26 RX ADMIN — HYDROCODONE BITARTRATE AND ACETAMINOPHEN 1 TABLET: 10; 325 TABLET ORAL at 07:08

## 2022-08-26 RX ADMIN — GABAPENTIN 600 MG: 300 CAPSULE ORAL at 05:08

## 2022-08-26 NOTE — PROGRESS NOTES
[facility] ([facility])  Rehab Progress Note    Patient Name: Marina Dixon  MRN: [id number]  Age: 62 y.o. Sex: female  : 1960  Hospital Length of Stay: 4 days  Date of Service: 2022   Chief Complaint: Lumbar radiculopathy s/p revision of right L2-3, L3-4 MIS with removal of hematoma on 2022    Subjective:     Basic Information  Admit Information: 62-year-old white female presented to [facility] ED on 2022 complaining of buttocks spasms, back pain, and leg pain that worsened on  s/p right L2-3 and L3-4 MIS hemilaminectomy and diskectomy on 8/15.  Denied bowel/bladder dysfunction.  PMH significant for NINA, lumbar stenosis, and HTN.  Imaging significant for fluid collection with pressure of the thecal sac.  Tolerated revision of right L2-3, L3-4 MIS with removal of hematoma on  without perioperative complications.  Robaxin initiated without relief of muscle spasms.  Valium and Decadron initiated on  with improvement pain/spasms.  Able to mobilize on  with adjustment of medications.  Tolerated transfer to [facility] inpatient rehab unit on  without incident.  Today's Information: No acute events overnight.  Just finished taking a shower.  Reports good sleep and appetite.  Last BM .  BP moderately controlled.  BP elevated.  No labs or imaging today.    Review of patient's allergies indicates:   Allergen Reactions    Sulfa (sulfonamide antibiotics) Itching and Rash        Current Facility-Administered Medications:     acetaminophen tablet 650 mg, 650 mg, Oral, Q4H PRN, Yung Kang, FNP, 650 mg at 22 2348    ALPRAZolam tablet 0.25 mg, 0.25 mg, Oral, TID PRN, Yung Kang, FNP    [START ON 2022] amLODIPine tablet 10 mg, 10 mg, Oral, Daily, Yung Kang, FNP    amLODIPine tablet 5 mg, 5 mg, Oral, Once, Yung Kang, FNP    benzonatate capsule 100 mg, 100 mg, Oral, TID PRN, Yung Kang, FNP     bisacodyL suppository 10 mg, 10 mg, Rectal, Daily PRN, Yung VINOD Jorge Luis, FNP    ciprofloxacin HCl tablet 500 mg, 500 mg, Oral, Q12H, Yung A Jorge Luis, FNP, 500 mg at 08/26/22 0821    [START ON 9/1/2022] dexAMETHasone tablet 2 mg, 2 mg, Oral, Daily, Yung A Jorge Luis, FNP    dexAMETHasone tablet 4 mg, 4 mg, Oral, Q12H, Yung A Jorge Luis, FNP, 4 mg at 08/26/22 0821    [START ON 8/29/2022] dexAMETHasone tablet 4 mg, 4 mg, Oral, Daily, Yung A Jorge Luis, FNP    DULoxetine DR capsule 60 mg, 60 mg, Oral, Daily, Yung A Jorge Luis, FNP, 60 mg at 08/26/22 0820    enoxaparin injection 40 mg, 40 mg, Subcutaneous, Daily, Yung A Jorge Luis, FNP, 40 mg at 08/25/22 1806    famotidine tablet 20 mg, 20 mg, Oral, BID PRN, Yung A Jorge Luis, FNP, 20 mg at 08/23/22 0630    gabapentin capsule 600 mg, 600 mg, Oral, TID, Yung A Jorge Luis, FNP, 600 mg at 08/26/22 0540    hydrALAZINE injection 10 mg, 10 mg, Intravenous, Q4H PRN, Yung A Jorge Luis, FNP    HYDROcodone-acetaminophen  mg per tablet 1 tablet, 1 tablet, Oral, Q6H PRN, Yung A Jorge Luis, FNP, 1 tablet at 08/26/22 0541    hydrOXYzine pamoate capsule 50 mg, 50 mg, Oral, Nightly PRN, Yung A Jorge Luis, FNP, 50 mg at 08/25/22 2119    labetalol 20 mg/4 mL (5 mg/mL) IV syring, 10 mg, Intravenous, Q4H PRN, Yung A Jorge Luis, FNP    methocarbamoL tablet 500 mg, 500 mg, Oral, TID, Yung A Jorge Luis, FNP, 500 mg at 08/26/22 0541    metoprolol injection 10 mg, 10 mg, Intravenous, Q2H PRN, Yung A Jorge Luis, FNP    naloxegoL (MOVANTIK) tablet 25 mg, 25 mg, Oral, Daily, Yung Kang FNP, 25 mg at 08/26/22 0821    nitroGLYCERIN SL tablet 0.4 mg, 0.4 mg, Sublingual, Q5 Min PRN, Yung Kang, FNP    ondansetron disintegrating tablet 4 mg, 4 mg, Oral, Q6H PRN, Yung Kang, FNP    ondansetron disintegrating tablet 8 mg, 8 mg, Oral, Q6H PRN, Yung Lillyart, FNP    pantoprazole EC tablet 40 mg, 40 mg, Oral, Daily,  "Yung Kang, FNP, 40 mg at 08/26/22 0820    polyethylene glycol packet 17 g, 17 g, Oral, BID PRN, Yung Kang FNP, 17 g at 08/24/22 1001    promethazine tablet 25 mg, 25 mg, Oral, Q6H PRN, Yung Kang, FNP    triamterene-hydrochlorothiazide 37.5-25 mg per tablet 1 tablet, 1 tablet, Oral, Daily, Yung Kang, FNP, 1 tablet at 08/26/22 0820    vitamin D 1000 units tablet 5,000 Units, 5,000 Units, Oral, Daily, Yung Lillyart, FNP, 5,000 Units at 08/26/22 0820     Review of Systems   Complete 12-point review of symptoms negative except for what's mentioned in HPI     Objective:     BP (!) 156/81 (BP Location: Right arm)   Pulse 70   Temp 98 °F (36.7 °C) (Oral)   Resp 18   Ht 5' 6" (1.676 m)   Wt 114.9 kg (253 lb 4.9 oz)   SpO2 96%   BMI 40.89 kg/m²        Intake/Output Summary (Last 24 hours) at 8/26/2022 1156  Last data filed at 8/26/2022 0800  Gross per 24 hour   Intake 1320 ml   Output --   Net 1320 ml     Physical Exam  Constitutional:       Appearance: Normal appearance.   HENT:      Head: Normocephalic.      Mouth/Throat:      Mouth: Mucous membranes are moist.   Eyes:      Pupils: Pupils are equal, round, and reactive to light.   Cardiovascular:      Rate and Rhythm: Normal rate and regular rhythm.      Heart sounds: Normal heart sounds.      Comments: BL LE trace edema.  Pulmonary:      Effort: Pulmonary effort is normal.      Breath sounds: Normal breath sounds.   Abdominal:      General: Bowel sounds are normal.      Palpations: Abdomen is soft.   Musculoskeletal:      Cervical back: Neck supple.      Comments: Generalized weakness and muscle atrophy. Lumbar dressing clean and intact.  Incision looks good with no drainage.  Steri-Strips intact.    Skin:     General: Skin is warm and dry.   Neurological:      General: No focal deficit present.      Mental Status: She is alert and oriented to person, place, and time.   Psychiatric:         Mood and Affect: Mood " normal.         Behavior: Behavior normal.         Thought Content: Thought content normal.         Judgment: Judgment normal.     *MD performed and documented physical examination       Lines/Drains/Airways     Peripheral Intravenous Line  Duration                Peripheral IV - Single Lumen 08/25/22 1515 22 G Anterior;Distal;Right Wrist <1 day            Labs  No results found for this or any previous visit (from the past 24 hour(s)).    Radiology  MRI lumbar spine with and without contrast on 8/17/2022, IMPRESSION: Severe canal stenosis at L2-L3 and L3-L4, moderate at L4-L5, with dorsal epidural fluid collection. Multilevel neural foraminal stenoses as described. Postoperative fluid in the right sided laminectomy beds and subcutaneous soft tissues.     Assessment/Plan:     62 y.o. WF admitted on 8/22/2022    Lumbar radiculopathy   - s/p right L2-3 and L3-4 MIS hemilaminectomy and diskectomy on 8/15  - s/p revision of right L2-3, L3-4 MIS with removal of hematoma on 8/18/2022  - continue                Lovenox 40 mg daily                Gabapentin 600 mg t.i.d.                Cymbalta 60 mg daily                 Robaxin 500 mg t.i.d.                Decadron taper                Norco 10 mg/325 mg q.4 hours p.r.n.  - defer to physiatry for rehab and pain management  - PT/OT/RT/ST following     Vitamin-D deficiency   - obtain vitamin-D level with next labs  - continue                Vitamin-D 5000 units daily     GERD  - Avoid spicy foods, and nothing to eat or drink within x2 hours of bedtime or laying flat (water is ok)   - Avoid NSAIDs (Advil, ibuprofen, naproxen...) and ramirez-2 inhibitors (Mobic, Celebrex)    - continue               Protonix 40 mg daily                Pepcid 20 mg BID prn     Constipation  - last BM 8/25  - continue                Movantik 25 mg daily (initiated 8/23)                MiraLax 17 g daily     HTN  - BP elevated  - continue    Norvasc 10 mg daily (increased 8/26)                Triamterene/HCTZ 37.5 mg-25 mg daily               Hydralazine 10 mg every 2 hours as needed for BP > 160/90               Labetalol 10 mg every 2 hours as needed for BP > 160/90     NINA  - stable   - compliant with CPAP at home     Normocytic anemia  - asymptomatic  - H/H stable   - no evidence of active bleeds  - will closely monitor and transfuse if needed     Dysuria  - improved  - UA unremarkable  - Urine pending   - continue   Cipro 500 mg b.i.d. 8/25-present (end date 8/27)     VTE Prophylaxis:  Lovenox 40 mg daily  COVID-19 testing:  Negative on 08/22/2022  COVID-19 vaccination status:  Vaccinated (Pfizer):  03/05/2021, 03/06/2021, and 12/21/2021     POA: No  Living will: No  Contacts:    Petr Dixon () [phone]                      Shadia Javier (daughter) [phone]     CODE STATUS: Full Code  Internal Medicine (attending): Roderick Serna MD  Physiatry (consulting):  Arsh Wood MD     OUTPATIENT PROVIDERS  PCP: Kalli Sweeney MD  Neuro surgery: Osvaldo Aquino MD  Cardiology: Karl Jonas MD     DISPOSITION: Condition stable.  Sleep hygiene, bowel maintenance, and appetite at goal.  Last BM 8/25.  BP moderately controlled.  No labs or imaging today.  BP likely elevated due to Decadron.  Increase Norvasc 10 mg daily.  Plan to adjust as Decadron as tapered and BP improves.  Continue current POC.  Monitor closely.  Notify of acute changes.    Ravin Kang NP conducted independent physical examination and assisted with medical documentation.     Total time spent on this encounter including chart review and direct MD + NP 1-on-1 patient interaction: 41 minutes   Over 50% of this time was spent in counseling and coordination of care

## 2022-08-26 NOTE — PLAN OF CARE
Problem: Pain Acute  Goal: Acceptable Pain Control and Functional Ability  Outcome: Ongoing, Progressing  Intervention: Develop Pain Management Plan  Flowsheets (Taken 8/25/2022 8992)  Pain Management Interventions: medication offered

## 2022-08-26 NOTE — PT/OT/SLP PROGRESS
Physical Therapy Inpatient Rehab Treatment    Patient Name:  Marina Dixon   MRN:  [id number]    Recommendations:     Discharge Recommendations:  other (see comments) (pending progress)   Discharge Equipment Recommendations:     Barriers to discharge: None    Assessment:     Marina Dixon is a 62 y.o. female admitted with a medical diagnosis of Lumbar radiculopathy.  She presents with the following impairments/functional limitations:  weakness, impaired endurance, impaired functional mobility, gait instability, impaired balance, decreased safety awareness, pain .    Rehab Diagnosis:     Recent Surgery: * No surgery found *      General Precautions: Standard, fall     Orthopedic Precautions:spinal precautions     Braces: LSO    Rehab Prognosis: Good; patient would benefit from acute skilled PT services to address these deficits and reach maximum level of function.      History:     Past Medical History:   Diagnosis Date    Arthritis     Endometriosis of uterus 2002    GERD (gastroesophageal reflux disease)     Hypertension 2020    Menopause 2002    Hysterectomy    NINA (obstructive sleep apnea)     wears cpap    Sciatica        Past Surgical History:   Procedure Laterality Date    BREAST BIOPSY Right 6/27/2022    Procedure: BIOPSY, BREAST, WITH LUMPECTOMY / Right Major duct excision;  Surgeon: Sintia Red MD;  Location[facility];  Service: General;  Laterality: Right;    CATARACT EXTRACTION W/ INTRAOCULAR LENS  IMPLANT, BILATERAL      HYSTERECTOMY  2002    LUMBAR LAMINECTOMY N/A 8/18/2022    Procedure: LAMINECTOMY, SPINE, LUMBAR;  Surgeon: Osvaldo Aquino MD;  Location: [facility];  Service: Neurosurgery;  Laterality: N/A;  open L2/3, L3/4 laminectomy  drainage of fluid    SKIN BIOPSY Right     breast       Subjective     Chief Complaint: N/A  Patient/Family Comments/goals: N/A    Vitals   Vitals at Rest  BP    HR    O2 Sat    Pain 0/10     Vitals With Activity  BP    HR    O2 Sat    Pain       Respiratory Status: Room air    Patients cultural, spiritual, Muslim conflicts given the current situation: no      Objective:     Communicated with RN prior to session.  Patient found up in chair with peripheral IV  upon PT entry to room.    Pt is Oriented x3 and Alert, Cooperative and Motivated.    Functional Mobility:        Current   Status   Discharge   Goal   Functional Area: Care Score:     Roll Left and Right    Independent   Sit to Lying    Independent   Lying to Sitting on Side of Bed    Independent   Sit to Stand 6  Independent   Chair/Bed-to-Chair Transfer 6  Independent   Car Transfer    Independent   Walk 10 Feet 6  Independent   Walk 50 Feet with Two Turns 6  Independent   Walk 150 Feet 4 180' and then 560' with RW. Overall Mod I to CGA. Pt one slight LOB with increased distance with SBA to recover. LSO donned throughout  Independent   Walk 10 Feet Uneven Surface    Set-up/clean-up   1 Step (Curb)    Independent   4 Steps    Independent   12 Steps    Independent   Picking Up Object    Independent   Wheel 50 Feet with Two Turns    Not applicable   Wheel 150 Feet    Not applicable       Therapeutic Activities and Exercises:  UBE seated 5'/5' Fwd and backward    Activity Tolerance good    Patient left up in chair with all lines intact and call button in reach.    Education provided: roles and goals of PT/PTA, transfer training, gait training and strengthening exercises    Expected compliance:    GOALS:   Multidisciplinary Problems     Physical Therapy Goals        Problem: Physical Therapy    Goal Priority Disciplines Outcome Goal Variances Interventions   Physical Therapy Goal     PT, PT/OT Ongoing, Progressing     Description: Bed Mobility:  Roll left and right independently.  Sit to supine transfer independently.  Supine to sit transfer independently.    Transfers:  Sit to stand transfer with setup/clean-up assist using RW and LRAD.  Bed to chair transfer with setup/clean-up assist using RW  and LRAD.  Car transfer with setup/clean-up assist using RW and LRAD.    Mobility:  Ambulate 200 feet with setup/clean-up assist using RW and LRAD.  Ambulate 10 feet on uneven surfaces/ramps with setup/clean-up assist using RW and LRAD.  Ascend/descend a 4 inch curb with setup/clean-up assist using RW and LRAD.   Ascend/descend 12 stairs with setup/clean-up assist using bilateral handrails. ( Pt has 2 steps with out rail at home)                    Plan:     During this hospitalization, patient to be seen 5 x/week to address the identified rehab impairments via gait training, therapeutic activities, therapeutic exercises and progress toward the following goals:     Plan of Care Expires:  08/29/22    Additional Information:         Time Tracking:     PT Received On: 08/26/22  Time In 1300     Time Out 1330  Total Time 30 min  Therapy Time PT Individual: 30  Missed Time:    Time Missed due to:      Billable Minutes: Gait Training 20 and Therapeutic Activity 10    08/26/2022

## 2022-08-26 NOTE — PT/OT/SLP PROGRESS
Physical Therapy Inpatient Rehab Treatment    Patient Name:  Marina Dixon   MRN:  [id number]    Recommendations:     Discharge Recommendations:  other (see comments) (pending progress)   Discharge Equipment Recommendations:     Barriers to discharge: None    Assessment:     Marina Dixon is a 62 y.o. female admitted with a medical diagnosis of Lumbar radiculopathy.  She presents with the following impairments/functional limitations:  weakness, impaired endurance, impaired functional mobility, gait instability, impaired balance, decreased safety awareness, pain.    Rehab Diagnosis:     Recent Surgery: * No surgery found *      General Precautions: Standard, fall     Orthopedic Precautions:spinal precautions     Braces: LSO (OOB)    Rehab Prognosis: Good; patient would benefit from acute skilled PT services to address these deficits and reach maximum level of function.      History:     Past Medical History:   Diagnosis Date    Arthritis     Endometriosis of uterus 2002    GERD (gastroesophageal reflux disease)     Hypertension 2020    Menopause 2002    Hysterectomy    NINA (obstructive sleep apnea)     wears cpap    Sciatica        Past Surgical History:   Procedure Laterality Date    BREAST BIOPSY Right 6/27/2022    Procedure: BIOPSY, BREAST, WITH LUMPECTOMY / Right Major duct excision;  Surgeon: Sintia Red MD;  Location: [facility];  Service: General;  Laterality: Right;    CATARACT EXTRACTION W/ INTRAOCULAR LENS  IMPLANT, BILATERAL      HYSTERECTOMY  2002    LUMBAR LAMINECTOMY N/A 8/18/2022    Procedure: LAMINECTOMY, SPINE, LUMBAR;  Surgeon: Osvaldo Aquino MD;  Location: [facility];  Service: Neurosurgery;  Laterality: N/A;  open L2/3, L3/4 laminectomy  drainage of fluid    SKIN BIOPSY Right     breast       Subjective     Chief Complaint: Tired and c/o burning pain in R Anterior calf  Patient/Family Comments/goals: N/A    Vitals   Vitals at Rest  /81   HR 70   O2 Sat    Pain 3/10     Vitals With  Activity  /75   HR 65   O2 Sat    Pain      Respiratory Status: Room air    Patients cultural, spiritual, Jew conflicts given the current situation: no      Objective:     Communicated with RN prior to session.  Patient found up in chair with all needs in reach upon PT entry to room.    Pt is Oriented x3 and Alert, Cooperative and Motivated.    Functional Mobility:   Toilet t/f: + void + BM overall Mod I   Timed Up and Go: 19 sec with RW; 15 sec without RW   Patient shown slight improvement in score  5xSTS: 19 sec   Patient shown slight improvement in score     Current   Status   Discharge   Goal   Functional Area: Care Score:     Roll Left and Right    Independent   Sit to Lying    Independent   Lying to Sitting on Side of Bed    Independent   Sit to Stand 6 Performed in RW; LSO donned Independent   Chair/Bed-to-Chair Transfer 4 With RW Independent   Car Transfer    Independent   Walk 10 Feet    Independent   Walk 50 Feet with Two Turns    Independent   Walk 150 Feet    Independent   Walk 10 Feet Uneven Surface    Set-up/clean-up   1 Step (Curb)    Independent   4 Steps    Independent   12 Steps    Independent   Picking Up Object    Independent   Wheel 50 Feet with Two Turns    Not applicable   Wheel 150 Feet    Not applicable       Therapeutic Activities and Exercises:  // bars Static standing balance on Airexfoam w/o UE support 1 min 30 sec x 2 trials; 2 min x 1 trial    // bars BLE Step ups on Airexfoam with BUE support 3 x 15    // bars 3 way Hip sequence BLE with BUE support 1 x 15    // bars BLE Heel raises with BUE support 3 x 15    // bars Mini squats BLE with BUE support 3 x 15    // bars BLE marches with BUE support 1 x 15    Recumbent bike 15 mins 1.2 resistance.       Activity Tolerance Good    Patient left up in chair with all lines intact and call button in reach.    Education provided: roles and goals of PT/PTA, transfer training, balance training, safety awareness, body mechanics,  assistive device and spinal precautions    Expected compliance:    GOALS:   Multidisciplinary Problems       Physical Therapy Goals          Problem: Physical Therapy    Goal Priority Disciplines Outcome Goal Variances Interventions   Physical Therapy Goal     PT, PT/OT Ongoing, Progressing     Description: Bed Mobility:  Roll left and right independently.  Sit to supine transfer independently.  Supine to sit transfer independently.    Transfers:  Sit to stand transfer with setup/clean-up assist using RW and LRAD.  Bed to chair transfer with setup/clean-up assist using RW and LRAD.  Car transfer with setup/clean-up assist using RW and LRAD.    Mobility:  Ambulate 200 feet with setup/clean-up assist using RW and LRAD.  Ambulate 10 feet on uneven surfaces/ramps with setup/clean-up assist using RW and LRAD.  Ascend/descend a 4 inch curb with setup/clean-up assist using RW and LRAD.   Ascend/descend 12 stairs with setup/clean-up assist using bilateral handrails. ( Pt has 2 steps with out rail at home)                        Plan:     During this hospitalization, patient to be seen 5 x/week to address the identified rehab impairments via gait training, therapeutic activities, therapeutic exercises and progress toward the following goals:    Plan of Care Expires:  08/29/22    Additional Information:     Patient received education on HEP and spinal precautions.    Time Tracking:     PT Received On:    Time In       Time Out    Total Time    Therapy Time    Missed Time:    Time Missed due to:      Billable Minutes: Therapeutic Activity 60 and Therapeutic Exercise 30    08/26/2022

## 2022-08-26 NOTE — PLAN OF CARE
Problem: Rehabilitation ([facility]) Plan of Care  Goal: Plan of Care Review  Outcome: Ongoing, Progressing  Goal: Patient-Specific Goal (Individualized)  Outcome: Ongoing, Progressing  Goal: Absence of New-Onset Illness or Injury  Outcome: Ongoing, Progressing  Goal: Optimal Comfort and Wellbeing  Outcome: Ongoing, Progressing  Goal: Readiness for Transition of Care  Outcome: Ongoing, Progressing     Problem: Bariatric Environmental Safety  Goal: Safety Maintained with Care  Outcome: Ongoing, Progressing     Problem: Fall Injury Risk  Goal: Absence of Fall and Fall-Related Injury  Outcome: Ongoing, Progressing  Intervention: Promote Injury-Free Environment  Flowsheets (Taken 8/26/2022 1009)  Safety Promotion/Fall Prevention:   assistive device/personal item within reach   medications reviewed   lighting adjusted   in recliner, wheels locked   side rails raised x 2   instructed to call staff for mobility   gait belt with ambulation     Problem: Pain Acute  Goal: Acceptable Pain Control and Functional Ability  Outcome: Ongoing, Progressing

## 2022-08-26 NOTE — PT/OT/SLP PROGRESS
Occupational Therapy Inpatient Rehab Treatment    Name: Marina Dixon  MRN: [id number]    Assessment:  Marina Dixon is a 62 y.o. female admitted with a medical diagnosis of Lumbar radiculopathy.  She presents with the following impairments/functional limitations:  pain.    General Precautions: Standard, fall     Orthopedic Precautions:spinal precautions     Braces: LSO    Rehab Prognosis: Good; patient would benefit from acute skilled OT services to address these deficits and reach maximum level of function.      History:     Past Medical History:   Diagnosis Date    Arthritis     Endometriosis of uterus 2002    GERD (gastroesophageal reflux disease)     Hypertension 2020    Menopause 2002    Hysterectomy    NINA (obstructive sleep apnea)     wears cpap    Sciatica        Past Surgical History:   Procedure Laterality Date    BREAST BIOPSY Right 6/27/2022    Procedure: BIOPSY, BREAST, WITH LUMPECTOMY / Right Major duct excision;  Surgeon: Sintia Red MD;  Location: [facility];  Service: General;  Laterality: Right;    CATARACT EXTRACTION W/ INTRAOCULAR LENS  IMPLANT, BILATERAL      HYSTERECTOMY  2002    LUMBAR LAMINECTOMY N/A 8/18/2022    Procedure: LAMINECTOMY, SPINE, LUMBAR;  Surgeon: Osvaldo Aquino MD;  Location: [facility];  Service: Neurosurgery;  Laterality: N/A;  open L2/3, L3/4 laminectomy  drainage of fluid    SKIN BIOPSY Right     breast       Subjective     Orientation: Oriented x4    Vitals   Vitals:    08/26/22 1000   BP: (!) 148/80     Respiratory Status: Room air  Patients cultural, spiritual, Confucianist conflicts given the current situation: no       Objective:     Patient found up in chair with peripheral IV  upon OT entry to room.    Mobility   Patient completed:  Sit to Stand Transfer with independence with rolling walker  Stand to Sit Transfer with independence with rolling walker  Bed to Chair Transfer using Step Transfer technique with independence with standard walker  Toilet  Transfer Step Transfer technique with independence with  rolling walker and bedside commode  Tub Transfer Step Transfer technique with independence with rolling walker and TTB    Functional Mobility: Short FM in/out bathroom with RW and Indep.    ADLs   Current Status   Oral Hygiene 6   Shower, Bathe Self 6   Upper Body Dressing 6   Lower Body Dressing 6   Toileting Hygiene 6   Toilet Transfer 6   Putting On, Taking Off Footwear 6     Limiting Factors for ADLs: pain     LifeStyle Change and Education:     Patient left up in chair with call button in reach.     Education provided: Roles and goals of OT, ADLs, transfer training, body mechanics, safety precautions, fall prevention, post-op precautions and home safety    Multidisciplinary Problems     Occupational Therapy Goals        Problem: Occupational Therapy    Goal Priority Disciplines Outcome Interventions   Occupational Therapy Goal     OT, PT/OT Ongoing, Progressing    Description: ADLs:  Pt to perform grooming tasks with independently and standing at sink.  Pt to perform UB dressing with set up.   Pt to perform LB dressing with set up with AE as needed   Pt to perform putting on/off footwear task with set up and AE as needed.  Pt to perform toileting with set up using toilet aide as needed.    Functional Transfers:  Pt to perform toilet transfers with set up and RW  Pt to perform a walk-in shower transfer with set up and RW    IADLs:  Pt to perform simple meal prep with supervision.    Balance, Strengthening, Endurance, Balance:  Pt to consistently demonstrate adherence to orthopedic precautions during all ADL's as instructed by OT.  Pt to demonstrate good dynamic standing balance as required to perform ADL's from standing level.  Pt to demonstrate good BUE strength during functional task   Pt to demonstrate consistent adherence to breathing control and energy conservation techniques as educated by OT.                    Time Tracking     OT Received On:  08/26/22  Time In 1000     Time Out 1100  Total Time 60 min  Therapy Time: OT Individual: 60  Missed Time:    Missed Time Reason:      Billable Minutes: Self Care/Home Management 60    08/26/2022

## 2022-08-27 PROCEDURE — 97110 THERAPEUTIC EXERCISES: CPT

## 2022-08-27 PROCEDURE — 63600175 PHARM REV CODE 636 W HCPCS: Performed by: NURSE PRACTITIONER

## 2022-08-27 PROCEDURE — 97110 THERAPEUTIC EXERCISES: CPT | Mod: CQ

## 2022-08-27 PROCEDURE — 11800000 HC REHAB PRIVATE ROOM

## 2022-08-27 PROCEDURE — 97530 THERAPEUTIC ACTIVITIES: CPT

## 2022-08-27 PROCEDURE — 94761 N-INVAS EAR/PLS OXIMETRY MLT: CPT

## 2022-08-27 PROCEDURE — 94799 UNLISTED PULMONARY SVC/PX: CPT

## 2022-08-27 PROCEDURE — 25000003 PHARM REV CODE 250: Performed by: NURSE PRACTITIONER

## 2022-08-27 PROCEDURE — 97116 GAIT TRAINING THERAPY: CPT | Mod: CQ

## 2022-08-27 PROCEDURE — 97530 THERAPEUTIC ACTIVITIES: CPT | Mod: CQ

## 2022-08-27 RX ADMIN — GABAPENTIN 600 MG: 300 CAPSULE ORAL at 05:08

## 2022-08-27 RX ADMIN — NALOXEGOL OXALATE 25 MG: 25 TABLET, FILM COATED ORAL at 08:08

## 2022-08-27 RX ADMIN — METHOCARBAMOL 500 MG: 500 TABLET ORAL at 02:08

## 2022-08-27 RX ADMIN — METHOCARBAMOL 500 MG: 500 TABLET ORAL at 10:08

## 2022-08-27 RX ADMIN — GABAPENTIN 600 MG: 300 CAPSULE ORAL at 02:08

## 2022-08-27 RX ADMIN — DEXAMETHASONE 4 MG: 4 TABLET ORAL at 08:08

## 2022-08-27 RX ADMIN — TRIAMTERENE AND HYDROCHLOROTHIAZIDE 1 TABLET: 37.5; 25 TABLET ORAL at 08:08

## 2022-08-27 RX ADMIN — AMLODIPINE BESYLATE 10 MG: 5 TABLET ORAL at 08:08

## 2022-08-27 RX ADMIN — ENOXAPARIN SODIUM 40 MG: 40 INJECTION SUBCUTANEOUS at 05:08

## 2022-08-27 RX ADMIN — METHOCARBAMOL 500 MG: 500 TABLET ORAL at 05:08

## 2022-08-27 RX ADMIN — CIPROFLOXACIN 500 MG: 500 TABLET, FILM COATED ORAL at 08:08

## 2022-08-27 RX ADMIN — DULOXETINE 60 MG: 30 CAPSULE, DELAYED RELEASE ORAL at 08:08

## 2022-08-27 RX ADMIN — HYDROCODONE BITARTRATE AND ACETAMINOPHEN 1 TABLET: 10; 325 TABLET ORAL at 08:08

## 2022-08-27 RX ADMIN — PANTOPRAZOLE SODIUM 40 MG: 40 TABLET, DELAYED RELEASE ORAL at 08:08

## 2022-08-27 RX ADMIN — Medication 5000 UNITS: at 08:08

## 2022-08-27 RX ADMIN — GABAPENTIN 600 MG: 300 CAPSULE ORAL at 10:08

## 2022-08-27 NOTE — PT/OT/SLP PROGRESS
"Occupational Therapy Inpatient Rehab Treatment    Name: Marina Dixon  MRN: [id number]    Assessment:  Marina Dixon is a 62 y.o. female admitted with a medical diagnosis of Lumbar radiculopathy.  She presents with the following impairments/functional limitations:  pain, impaired endurance .    General Precautions: Standard, fall     Orthopedic Precautions:spinal precautions     Braces: LSO    Rehab Prognosis: Good; patient would benefit from acute skilled OT services to address these deficits and reach maximum level of function.      History:     Past Medical History:   Diagnosis Date    Arthritis     Endometriosis of uterus 2002    GERD (gastroesophageal reflux disease)     Hypertension 2020    Menopause 2002    Hysterectomy    NINA (obstructive sleep apnea)     wears cpap    Sciatica        Past Surgical History:   Procedure Laterality Date    BREAST BIOPSY Right 6/27/2022    Procedure: BIOPSY, BREAST, WITH LUMPECTOMY / Right Major duct excision;  Surgeon: Sintia Red MD;  Location: [facility];  Service: General;  Laterality: Right;    CATARACT EXTRACTION W/ INTRAOCULAR LENS  IMPLANT, BILATERAL      HYSTERECTOMY  2002    LUMBAR LAMINECTOMY N/A 8/18/2022    Procedure: LAMINECTOMY, SPINE, LUMBAR;  Surgeon: Osvaldo Aquino MD;  Location: [facility];  Service: Neurosurgery;  Laterality: N/A;  open L2/3, L3/4 laminectomy  drainage of fluid    SKIN BIOPSY Right     breast       Subjective     Orientation: Oriented x4    Chief Complaint: patient states she feels a little tired today.     Patient/Family Comments/goals: "to go home"    Respiratory Status: Room air    Patients cultural, spiritual, Tenriism conflicts given the current situation: no       Objective:     Patient found up in chair with    upon OT entry to room.    Mobility   Patient completed:  Sit to Stand Transfer with independence with rolling walker    Functional Mobility  Ambulated from room 411 down ADL dennison to ADL gym.     Limiting Factors for ADLs: " endurance and pain.        Therapeutic Activities  Clothespins seated in chair for fine motor coordination/ pinch strength alternating right and left hands to place on and take off.  Shoulder arc in standing x 2 for endurance strengthening.    Therapeutic Exercise  UBE x 6 minutes forward and reverse seated in chair per patient request.  4lb dowel used for 3x15 bicep curls.  Good quality observed with both activities.     LifeStyle Change and Education:             Patient handed off to   LONNIE Mahan  .     Education provided: body mechanics    Multidisciplinary Problems       Occupational Therapy Goals          Problem: Occupational Therapy    Goal Priority Disciplines Outcome Interventions   Occupational Therapy Goal     OT, PT/OT Ongoing, Progressing    Description: ADLs:  Pt to perform grooming tasks with independently and standing at sink.  Pt to perform UB dressing with set up.   Pt to perform LB dressing with set up with AE as needed   Pt to perform putting on/off footwear task with set up and AE as needed.  Pt to perform toileting with set up using toilet aide as needed.    Functional Transfers:  Pt to perform toilet transfers with set up and RW  Pt to perform a walk-in shower transfer with set up and RW    IADLs:  Pt to perform simple meal prep with supervision.    Balance, Strengthening, Endurance, Balance:  Pt to consistently demonstrate adherence to orthopedic precautions during all ADL's as instructed by OT.  Pt to demonstrate good dynamic standing balance as required to perform ADL's from standing level.  Pt to demonstrate good BUE strength during functional task   Pt to demonstrate consistent adherence to breathing control and energy conservation techniques as educated by OT.                        Time Tracking     OT Received On: 08/27/22  Time In 1230     Time Out 1330  Total Time 60 min  Therapy Time: OT Individual: 60  Missed Time:    Missed Time Reason:      Billable Minutes: Therapeutic  Activity 30 and Therapeutic Exercise 30    08/27/2022

## 2022-08-27 NOTE — PT/OT/SLP PROGRESS
Physical Therapy Inpatient Rehab Treatment    Patient Name:  Marina Dixon   MRN:  [id number]    Recommendations:     Discharge Recommendations:  other (see comments) (pending progress)   Discharge Equipment Recommendations:     Barriers to discharge:       Assessment:     Marina Dixon is a 62 y.o. female admitted with a medical diagnosis of Lumbar radiculopathy.  She presents with the following impairments/functional limitations:     .    Rehab Diagnosis:     Recent Surgery: * No surgery found *      General Precautions: Standard, fall     Orthopedic Precautions:spinal precautions     Braces: LSO    Rehab Prognosis: Good; patient would benefit from acute skilled PT services to address these deficits and reach maximum level of function.      History:     Past Medical History:   Diagnosis Date    Arthritis     Endometriosis of uterus 2002    GERD (gastroesophageal reflux disease)     Hypertension 2020    Menopause 2002    Hysterectomy    NINA (obstructive sleep apnea)     wears cpap    Sciatica        Past Surgical History:   Procedure Laterality Date    BREAST BIOPSY Right 6/27/2022    Procedure: BIOPSY, BREAST, WITH LUMPECTOMY / Right Major duct excision;  Surgeon: Sintia Red MD;  Location: [facility];  Service: General;  Laterality: Right;    CATARACT EXTRACTION W/ INTRAOCULAR LENS  IMPLANT, BILATERAL      HYSTERECTOMY  2002    LUMBAR LAMINECTOMY N/A 8/18/2022    Procedure: LAMINECTOMY, SPINE, LUMBAR;  Surgeon: Osvaldo Aquino MD;  Location: [facility];  Service: Neurosurgery;  Laterality: N/A;  open L2/3, L3/4 laminectomy  drainage of fluid    SKIN BIOPSY Right     breast       Subjective     Chief Complaint: Pt with some c/o fatigue, but not notable pain.  Patient/Family Comments/goals:        Respiratory Status: Room air    Patients cultural, spiritual, Samaritan conflicts given the current situation: no      Objective:     Communicated with NSG prior to session.  Patient found up in chair with    upon PT  entry to room.    Pt is Oriented x3 and Alert, Cooperative, and Motivated.    Functional Mobility:   Bed Mobility:     Rolling Left:  independence  Supine to Sit: independence  Sit to Supine: independence  Transfers:     Sit to Stand:  independence with rolling walker  Bed to Chair: independence with  rolling walker  using  Step Transfer  Chair to mat: independence with  rolling walker  using  Step Transfer  Gait: Pt ambulates 600'+400' with RW with Supervision.      Current   Status  Discharge   Goal   Functional Area: Care Score:    Roll Left and Right 6 Independent   Sit to Lying 6 Independent   Lying to Sitting on Side of Bed 6 Independent   Sit to Stand 6 Independent   Chair/Bed-to-Chair Transfer 6 Independent   Car Transfer   Independent   Walk 10 Feet 6 Independent   Walk 50 Feet with Two Turns 6 Independent   Walk 150 Feet 4 Independent   Walk 10 Feet Uneven Surface   Set-up/clean-up   1 Step (Curb)   Independent   4 Steps   Independent   12 Steps   Independent   Picking Up Object   Independent   Wheel 50 Feet with Two Turns   Not applicable   Wheel 150 Feet   Not applicable       Therapeutic Activities and Exercises:  Recumbent bike @ 1.2 for 15 minutes.  Supine therex 3x15 ea.   ADD ball squeezes, ABD with RTB, SLR, bridges  Performed standing in //:   SLS on dynadisc with 1HHA   Tandem walking and side stepping on airex beams   Airex marching and mini squats with no HHA      Activity Tolerance    Patient left up in chair with call button in reach.    Education provided: transfer training, bed mob, gait training, and balance training    Expected compliance:    GOALS:   Multidisciplinary Problems       Physical Therapy Goals          Problem: Physical Therapy    Goal Priority Disciplines Outcome Goal Variances Interventions   Physical Therapy Goal     PT, PT/OT Ongoing, Progressing     Description: Bed Mobility:  Roll left and right independently.  Sit to supine transfer independently.  Supine to sit  transfer independently.    Transfers:  Sit to stand transfer with setup/clean-up assist using RW and LRAD.  Bed to chair transfer with setup/clean-up assist using RW and LRAD.  Car transfer with setup/clean-up assist using RW and LRAD.    Mobility:  Ambulate 200 feet with setup/clean-up assist using RW and LRAD.  Ambulate 10 feet on uneven surfaces/ramps with setup/clean-up assist using RW and LRAD.  Ascend/descend a 4 inch curb with setup/clean-up assist using RW and LRAD.   Ascend/descend 12 stairs with setup/clean-up assist using bilateral handrails. ( Pt has 2 steps with out rail at home)                        Plan:     During this hospitalization, patient to be seen 5 x/week to address the identified rehab impairments via gait training, therapeutic activities, therapeutic exercises and progress toward the following goals:    Plan of Care Expires:  08/29/22    Additional Information:         Time Tracking:     PT Received On:    Time In 1030     Time Out 1200  Total Time 90 min  Therapy Time PT Individual: 90  Missed Time:    Time Missed due to:      Billable Minutes: Gait Training 15, Therapeutic Activity 30, and Therapeutic Exercise 45    08/27/2022

## 2022-08-27 NOTE — PT/OT/SLP PROGRESS
Physical Therapy Inpatient Rehab Treatment    Patient Name:  Marina Dixon   MRN:  [id number]    Recommendations:     Discharge Recommendations:  other (see comments) (pending progress)   Discharge Equipment Recommendations:     Barriers to discharge:       Assessment:     Marina iDxon is a 62 y.o. female admitted with a medical diagnosis of Lumbar radiculopathy.  She presents with the following impairments/functional limitations:     .    Rehab Diagnosis:     Recent Surgery: * No surgery found *      General Precautions: Standard, fall     Orthopedic Precautions:spinal precautions     Braces: LSO    Rehab Prognosis: Good; patient would benefit from acute skilled PT services to address these deficits and reach maximum level of function.      History:     Past Medical History:   Diagnosis Date    Arthritis     Endometriosis of uterus 2002    GERD (gastroesophageal reflux disease)     Hypertension 2020    Menopause 2002    Hysterectomy    NINA (obstructive sleep apnea)     wears cpap    Sciatica        Past Surgical History:   Procedure Laterality Date    BREAST BIOPSY Right 6/27/2022    Procedure: BIOPSY, BREAST, WITH LUMPECTOMY / Right Major duct excision;  Surgeon: Sintia Red MD;  Location: [facility];  Service: General;  Laterality: Right;    CATARACT EXTRACTION W/ INTRAOCULAR LENS  IMPLANT, BILATERAL      HYSTERECTOMY  2002    LUMBAR LAMINECTOMY N/A 8/18/2022    Procedure: LAMINECTOMY, SPINE, LUMBAR;  Surgeon: Osvaldo Aquino MD;  Location: [facility];  Service: Neurosurgery;  Laterality: N/A;  open L2/3, L3/4 laminectomy  drainage of fluid    SKIN BIOPSY Right     breast       Subjective     Chief Complaint: Pt with no complaints at this time.  Patient/Family Comments/goals:      Vitals   Vitals at Rest  BP    HR    O2 Sat    Pain      Vitals With Activity  BP    HR    O2 Sat    Pain      Respiratory Status: Room air    Patients cultural, spiritual, Sikh conflicts given the current situation:  no      Objective:     Communicated with OT prior to session.  Patient found  up in OT gym working with OT  with    upon PT entry to room.    Pt is Oriented x3 and Alert, Cooperative, and Motivated.    Functional Mobility:   Transfers:     Sit to Stand:  independence with rolling walker  Bed to Chair: independence with  rolling walker  using  Step Transfer  Toilet Transfer: independence with  rolling walker  using  Step Transfer     Current   Status  Discharge   Goal   Functional Area: Care Score:    Roll Left and Right 6 Independent   Sit to Lying 6 Independent   Lying to Sitting on Side of Bed 6 Independent   Sit to Stand 6 Independent   Chair/Bed-to-Chair Transfer 6 Independent   Car Transfer   Independent   Walk 10 Feet 6 Independent   Walk 50 Feet with Two Turns 6 Independent   Walk 150 Feet 4 Independent   Walk 10 Feet Uneven Surface   Set-up/clean-up   1 Step (Curb)   Independent   4 Steps   Independent   12 Steps   Independent   Picking Up Object   Independent   Wheel 50 Feet with Two Turns   Not applicable   Wheel 150 Feet   Not applicable       Therapeutic Activities and Exercises:  Seated therex 2x15 with 2#:   Hip flexion, LAQ, PF/DF, hip ABD   HSC with RTB    Activity Tolerance    Patient left up in chair with call button in reach.    Education provided: transfer training and strengthening exercises    Expected compliance:    GOALS:   Multidisciplinary Problems       Physical Therapy Goals          Problem: Physical Therapy    Goal Priority Disciplines Outcome Goal Variances Interventions   Physical Therapy Goal     PT, PT/OT Ongoing, Progressing     Description: Bed Mobility:  Roll left and right independently.  Sit to supine transfer independently.  Supine to sit transfer independently.    Transfers:  Sit to stand transfer with setup/clean-up assist using RW and LRAD.  Bed to chair transfer with setup/clean-up assist using RW and LRAD.  Car transfer with setup/clean-up assist using RW and  LRAD.    Mobility:  Ambulate 200 feet with setup/clean-up assist using RW and LRAD.  Ambulate 10 feet on uneven surfaces/ramps with setup/clean-up assist using RW and LRAD.  Ascend/descend a 4 inch curb with setup/clean-up assist using RW and LRAD.   Ascend/descend 12 stairs with setup/clean-up assist using bilateral handrails. ( Pt has 2 steps with out rail at home)                        Plan:     During this hospitalization, patient to be seen 5 x/week to address the identified rehab impairments via gait training, therapeutic activities, therapeutic exercises and progress toward the following goals:    Plan of Care Expires:  08/29/22    Additional Information:         Time Tracking:     PT Received On:    Time In 1330     Time Out 1400  Total Time 30 min  Therapy Time PT Individual: 30  Missed Time:    Time Missed due to:      Billable Minutes: Therapeutic Exercise 30    08/27/2022

## 2022-08-27 NOTE — PROGRESS NOTES
[facility] ([facility])  Rehab Progress Note    Patient Name: Marina Dixon  MRN: [id number]  Age: 62 y.o. Sex: female  : 1960  Hospital Length of Stay: 5 days  Date of Service: 2022   Chief Complaint: Lumbar radiculopathy s/p revision of right L2-3, L3-4 MIS with removal of hematoma on 2022    Subjective:     Basic Information  Admit Information: 62-year-old white female presented to [facility] ED on 2022 complaining of buttocks spasms, back pain, and leg pain that worsened on  s/p right L2-3 and L3-4 MIS hemilaminectomy and diskectomy on 8/15.  Denied bowel/bladder dysfunction.  PMH significant for NINA, lumbar stenosis, and HTN.  Imaging significant for fluid collection with pressure of the thecal sac.  Tolerated revision of right L2-3, L3-4 MIS with removal of hematoma on  without perioperative complications.  Robaxin initiated without relief of muscle spasms.  Valium and Decadron initiated on  with improvement pain/spasms.  Able to mobilize on  with adjustment of medications.  Tolerated transfer to [facility] inpatient rehab unit on  without incident.  Today's Information: No acute events overnight.  Lying comfortably in bed.  Reports good sleep and appetite.  Last BM . Vital signs at goal.  No labs or imaging today.     Review of patient's allergies indicates:   Allergen Reactions    Sulfa (sulfonamide antibiotics) Itching and Rash        Current Facility-Administered Medications:     acetaminophen tablet 650 mg, 650 mg, Oral, Q4H PRN, Yung A Jorge Luis, FNP, 650 mg at 22 2348    ALPRAZolam tablet 0.25 mg, 0.25 mg, Oral, TID PRN, Yung A Jorge Luis, FNP    amLODIPine tablet 10 mg, 10 mg, Oral, Daily, Yung A Jorge Luis, FNP, 10 mg at 22 0829    benzonatate capsule 100 mg, 100 mg, Oral, TID PRN, Yung A Jorge Luis, FNP    bisacodyL suppository 10 mg, 10 mg, Rectal, Daily PRN, Yung A Jorge Luis, FNP    ciprofloxacin HCl  tablet 500 mg, 500 mg, Oral, Q12H, Yung BROCK Jorge Luis, FNP, 500 mg at 08/27/22 0830    [START ON 9/1/2022] dexAMETHasone tablet 2 mg, 2 mg, Oral, Daily, Yung A Jorge Luis, FNP    dexAMETHasone tablet 4 mg, 4 mg, Oral, Q12H, Yung A Jorge Luis, FNP, 4 mg at 08/27/22 0833    [START ON 8/29/2022] dexAMETHasone tablet 4 mg, 4 mg, Oral, Daily, Yung A Jorge Luis, FNP    DULoxetine DR capsule 60 mg, 60 mg, Oral, Daily, Yung A Jorge Luis, FNP, 60 mg at 08/27/22 0830    enoxaparin injection 40 mg, 40 mg, Subcutaneous, Daily, Yung A Jorge Luis, FNP, 40 mg at 08/26/22 1716    famotidine tablet 20 mg, 20 mg, Oral, BID PRN, Yung A Jorge Luis, FNP, 20 mg at 08/23/22 0630    gabapentin capsule 600 mg, 600 mg, Oral, TID, Yung A Jorge Luis, FNP, 600 mg at 08/27/22 0522    hydrALAZINE injection 10 mg, 10 mg, Intravenous, Q4H PRN, Yung A Jorge Luis, FNP    HYDROcodone-acetaminophen  mg per tablet 1 tablet, 1 tablet, Oral, Q6H PRN, Yung A Jorge Luis, FNP, 1 tablet at 08/27/22 0835    hydrOXYzine pamoate capsule 50 mg, 50 mg, Oral, Nightly PRN, Yung A Jorge Luis, FNP, 50 mg at 08/25/22 2119    labetalol 20 mg/4 mL (5 mg/mL) IV syring, 10 mg, Intravenous, Q4H PRN, Yung A Jorge Luis, FNP    methocarbamoL tablet 500 mg, 500 mg, Oral, TID, Yung A Jorge Luis, FNP, 500 mg at 08/27/22 0521    metoprolol injection 10 mg, 10 mg, Intravenous, Q2H PRN, Yung A Jorge Luis, FNP    naloxegoL (MOVANTIK) tablet 25 mg, 25 mg, Oral, Daily, Yung A Jorge Luis, FNP, 25 mg at 08/27/22 0830    nitroGLYCERIN SL tablet 0.4 mg, 0.4 mg, Sublingual, Q5 Min PRN, JOSUÉ Avendaño    ondansetron disintegrating tablet 4 mg, 4 mg, Oral, Q6H PRN, Yung Kang FNP    ondansetron disintegrating tablet 8 mg, 8 mg, Oral, Q6H PRN, Yung Kang FNP    pantoprazole EC tablet 40 mg, 40 mg, Oral, Daily, XIMENA AvendañoP, 40 mg at 08/27/22 0830    polyethylene glycol packet 17 g, 17 g, Oral, BID PRN, Yung BROCK  "JOSUÉ Kang, 17 g at 08/24/22 1001    promethazine tablet 25 mg, 25 mg, Oral, Q6H PRN, JOSUÉ Avendaño    triamterene-hydrochlorothiazide 37.5-25 mg per tablet 1 tablet, 1 tablet, Oral, Daily, XIMENA AvendañoP, 1 tablet at 08/27/22 0830    vitamin D 1000 units tablet 5,000 Units, 5,000 Units, Oral, Daily, XIMENA AvendañoP, 5,000 Units at 08/27/22 0830     Review of Systems   Complete 12-point review of symptoms negative except for what's mentioned in HPI     Objective:     BP (!) 151/74   Pulse 77   Temp 98.1 °F (36.7 °C)   Resp 18   Ht 5' 6" (1.676 m)   Wt 107.7 kg (237 lb 7 oz)   SpO2 96%   BMI 38.32 kg/m²        Intake/Output Summary (Last 24 hours) at 8/27/2022 0855  Last data filed at 8/27/2022 0600  Gross per 24 hour   Intake 840 ml   Output --   Net 840 ml       Physical Exam  Constitutional:       Appearance: Normal appearance.   HENT:      Head: Normocephalic.      Mouth/Throat:      Mouth: Mucous membranes are moist.   Eyes:      Pupils: Pupils are equal, round, and reactive to light.   Cardiovascular:      Rate and Rhythm: Normal rate and regular rhythm.      Heart sounds: Normal heart sounds.      Comments: BL LE trace edema.  Pulmonary:      Effort: Pulmonary effort is normal.      Breath sounds: Normal breath sounds.   Abdominal:      General: Bowel sounds are normal.      Palpations: Abdomen is soft.   Musculoskeletal:      Cervical back: Neck supple.      Comments: Generalized weakness and muscle atrophy. Lumbar dressing clean and intact.  Incision looks good with no drainage.  Steri-Strips intact.    Skin:     General: Skin is warm and dry.   Neurological:      General: No focal deficit present.      Mental Status: She is alert and oriented to person, place, and time.   Psychiatric:         Mood and Affect: Mood normal.         Behavior: Behavior normal.         Thought Content: Thought content normal.         Judgment: Judgment normal.     Lines/Drains/Airways  "      Peripheral Intravenous Line  Duration                  Peripheral IV - Single Lumen 08/25/22 1515 22 G Anterior;Distal;Right Wrist 1 day                Labs  No results found for this or any previous visit (from the past 24 hour(s)).    Radiology  MRI lumbar spine with and without contrast on 8/17/2022, IMPRESSION: Severe canal stenosis at L2-L3 and L3-L4, moderate at L4-L5, with dorsal epidural fluid collection. Multilevel neural foraminal stenoses as described. Postoperative fluid in the right sided laminectomy beds and subcutaneous soft tissues.     Assessment/Plan:     62 y.o. WF admitted on 8/22/2022    Lumbar radiculopathy   - s/p right L2-3 and L3-4 MIS hemilaminectomy and diskectomy on 8/15  - s/p revision of right L2-3, L3-4 MIS with removal of hematoma on 8/18/2022  - continue                Lovenox 40 mg daily                Gabapentin 600 mg t.i.d.                Cymbalta 60 mg daily                 Robaxin 500 mg t.i.d.                Decadron taper                Norco 10 mg/325 mg q.4 hours p.r.n.  - defer to physiatry for rehab and pain management  - PT/OT/RT/ST following     Vitamin-D deficiency   - obtain vitamin-D level with next labs  - continue                Vitamin-D 5000 units daily     GERD  - Avoid spicy foods, and nothing to eat or drink within x2 hours of bedtime or laying flat (water is ok)   - Avoid NSAIDs (Advil, ibuprofen, naproxen...) and ramirez-2 inhibitors (Mobic, Celebrex)    - continue               Protonix 40 mg daily                Pepcid 20 mg BID prn     Constipation  - last BM 8/25  - continue                Movantik 25 mg daily (initiated 8/23)                MiraLax 17 g daily     HTN  - BP mod control  - continue    Norvasc 10 mg daily (increased 8/26)               Triamterene/HCTZ 37.5 mg-25 mg daily               Hydralazine 10 mg every 2 hours as needed for BP > 160/90               Labetalol 10 mg every 2 hours as needed for BP > 160/90     NINA  - stable   -  compliant with CPAP at home     Normocytic anemia  - asymptomatic  - H/H stable   - no evidence of active bleeds  - will closely monitor and transfuse if needed     AB therapy  Cipro 8/24-8/27     VTE Prophylaxis:  Lovenox 40 mg daily  COVID-19 testing:  Negative on 08/22/2022  COVID-19 vaccination status:  Vaccinated (Pfizer):  03/05/2021, 03/06/2021, and 12/21/2021     POA: No  Living will: No  Contacts:    Petr Dixon () [phone]                      Shadia Javier (daughter) [phone]     CODE STATUS: Full Code  Internal Medicine (attending): Roderick Serna MD  Physiatry (consulting):  Arsh Wood MD     OUTPATIENT PROVIDERS  PCP: Kalli Sweeney MD  Neuro surgery: Osvaldo Aquino MD  Cardiology: Karl Jonas MD     DISPOSITION: Condition stable.  Sleep hygiene, bowel maintenance, and appetite at goal.  Last BM 8/25.  BP moderately controlled.  No labs or imaging today.  BP likely elevated due to Decadron.  Norvasc 10 mg daily increased on 8/26.   Continue current POC.  Monitor closely.  Notify of acute changes.    Total time spent on this encounter including chart review and direct 1-on-1 patient interaction: 37 minutes   Over 50% of this time was spent in counseling and coordination of care

## 2022-08-28 PROCEDURE — 97530 THERAPEUTIC ACTIVITIES: CPT

## 2022-08-28 PROCEDURE — 11800000 HC REHAB PRIVATE ROOM

## 2022-08-28 PROCEDURE — 63600175 PHARM REV CODE 636 W HCPCS: Performed by: NURSE PRACTITIONER

## 2022-08-28 PROCEDURE — 25000003 PHARM REV CODE 250: Performed by: NURSE PRACTITIONER

## 2022-08-28 PROCEDURE — 94761 N-INVAS EAR/PLS OXIMETRY MLT: CPT

## 2022-08-28 PROCEDURE — 94799 UNLISTED PULMONARY SVC/PX: CPT

## 2022-08-28 RX ORDER — METHOCARBAMOL 500 MG/1
500 TABLET, FILM COATED ORAL 3 TIMES DAILY PRN
Status: DISCONTINUED | OUTPATIENT
Start: 2022-08-28 | End: 2022-08-30 | Stop reason: HOSPADM

## 2022-08-28 RX ADMIN — GABAPENTIN 600 MG: 300 CAPSULE ORAL at 07:08

## 2022-08-28 RX ADMIN — METHOCARBAMOL 500 MG: 500 TABLET ORAL at 05:08

## 2022-08-28 RX ADMIN — NALOXEGOL OXALATE 25 MG: 25 TABLET, FILM COATED ORAL at 08:08

## 2022-08-28 RX ADMIN — PANTOPRAZOLE SODIUM 40 MG: 40 TABLET, DELAYED RELEASE ORAL at 08:08

## 2022-08-28 RX ADMIN — DEXAMETHASONE 4 MG: 4 TABLET ORAL at 08:08

## 2022-08-28 RX ADMIN — TRIAMTERENE AND HYDROCHLOROTHIAZIDE 1 TABLET: 37.5; 25 TABLET ORAL at 08:08

## 2022-08-28 RX ADMIN — ENOXAPARIN SODIUM 40 MG: 40 INJECTION SUBCUTANEOUS at 05:08

## 2022-08-28 RX ADMIN — AMLODIPINE BESYLATE 10 MG: 5 TABLET ORAL at 08:08

## 2022-08-28 RX ADMIN — Medication 5000 UNITS: at 08:08

## 2022-08-28 RX ADMIN — HYDROCODONE BITARTRATE AND ACETAMINOPHEN 1 TABLET: 10; 325 TABLET ORAL at 07:08

## 2022-08-28 RX ADMIN — DULOXETINE 60 MG: 30 CAPSULE, DELAYED RELEASE ORAL at 08:08

## 2022-08-28 RX ADMIN — GABAPENTIN 600 MG: 300 CAPSULE ORAL at 02:08

## 2022-08-28 RX ADMIN — HYDROCODONE BITARTRATE AND ACETAMINOPHEN 1 TABLET: 10; 325 TABLET ORAL at 06:08

## 2022-08-28 RX ADMIN — CIPROFLOXACIN 500 MG: 500 TABLET, FILM COATED ORAL at 08:08

## 2022-08-28 RX ADMIN — GABAPENTIN 600 MG: 300 CAPSULE ORAL at 05:08

## 2022-08-28 RX ADMIN — DEXAMETHASONE 4 MG: 4 TABLET ORAL at 07:08

## 2022-08-28 NOTE — PROGRESS NOTES
[facility] ([facility])  Rehab Progress Note    Patient Name: Marina Dixon  MRN: [id number]  Age: 62 y.o. Sex: female  : 1960  Hospital Length of Stay: 6 days  Date of Service: 2022   Chief Complaint: Lumbar radiculopathy s/p revision of right L2-3, L3-4 MIS with removal of hematoma on 2022    Subjective:     Basic Information  Admit Information: 62-year-old white female presented to [facility] ED on 2022 complaining of buttocks spasms, back pain, and leg pain that worsened on  s/p right L2-3 and L3-4 MIS hemilaminectomy and diskectomy on 8/15.  Denied bowel/bladder dysfunction.  PMH significant for NINA, lumbar stenosis, and HTN.  Imaging significant for fluid collection with pressure of the thecal sac.  Tolerated revision of right L2-3, L3-4 MIS with removal of hematoma on  without perioperative complications.  Robaxin initiated without relief of muscle spasms.  Valium and Decadron initiated on  with improvement pain/spasms.  Able to mobilize on  with adjustment of medications.  Tolerated transfer to [facility] inpatient rehab unit on  without incident.  Today's Information: No acute events overnight.  Sitting comfortably in chair at bedside.  Pleasant mood.  Reports that she feels drowsy and attributes symptoms to current pain regimen.  Requesting to modify regimen an effort to improve wakefulness.  Otherwise, no complaints.  Reports good sleep and appetite.  No bowel or bladder complaints.  Last recorded BM on .  Ongoing mild systolic hypertension.  Vitals otherwise stable with no recent recorded fevers.  No new labs or imaging.    Review of patient's allergies indicates:   Allergen Reactions    Sulfa (sulfonamide antibiotics) Itching and Rash        Current Facility-Administered Medications:     acetaminophen tablet 650 mg, 650 mg, Oral, Q4H PRN, JOSUÉ Avendaño, 650 mg at 22 2348    ALPRAZolam tablet 0.25 mg, 0.25 mg,  Oral, TID PRN, Yung Collinsehart, FNP    amLODIPine tablet 10 mg, 10 mg, Oral, Daily, Yung A Jorge Luis, FNP, 10 mg at 08/28/22 0831    benzonatate capsule 100 mg, 100 mg, Oral, TID PRN, Yung A Jorge Luis, FNP    bisacodyL suppository 10 mg, 10 mg, Rectal, Daily PRN, Yung Collinsehart, FNP    [START ON 9/1/2022] dexAMETHasone tablet 2 mg, 2 mg, Oral, Daily, Yung A Jorge Luis, FNP    dexAMETHasone tablet 4 mg, 4 mg, Oral, Q12H, Yung A Jorge Luis, FNP, 4 mg at 08/28/22 0831    [START ON 8/29/2022] dexAMETHasone tablet 4 mg, 4 mg, Oral, Daily, Yung A Jorge Luis, FNP    DULoxetine DR capsule 60 mg, 60 mg, Oral, Daily, Yung A Jorge Luis, FNP, 60 mg at 08/28/22 0832    enoxaparin injection 40 mg, 40 mg, Subcutaneous, Daily, Yung A Jorge Luis, FNP, 40 mg at 08/27/22 1718    famotidine tablet 20 mg, 20 mg, Oral, BID PRN, Yung A Jorge Luis, FNP, 20 mg at 08/23/22 0630    gabapentin capsule 600 mg, 600 mg, Oral, TID, Yung A Jorge Luis, FNP, 600 mg at 08/28/22 0532    hydrALAZINE injection 10 mg, 10 mg, Intravenous, Q4H PRN, Yung A Jorge Luis, FNP    HYDROcodone-acetaminophen  mg per tablet 1 tablet, 1 tablet, Oral, Q6H PRN, Yung A Jorge Luis, FNP, 1 tablet at 08/28/22 0636    hydrOXYzine pamoate capsule 50 mg, 50 mg, Oral, Nightly PRN, Yung A Jorge Luis, FNP, 50 mg at 08/25/22 2119    labetalol 20 mg/4 mL (5 mg/mL) IV syring, 10 mg, Intravenous, Q4H PRN, Yung A Jorge Luis, FNP    methocarbamoL tablet 500 mg, 500 mg, Oral, TID, Yung Kang, FNP, 500 mg at 08/28/22 0532    metoprolol injection 10 mg, 10 mg, Intravenous, Q2H PRN, Yung Kang, FNP    naloxegoL (MOVANTIK) tablet 25 mg, 25 mg, Oral, Daily, Yung Kang, FNP, 25 mg at 08/28/22 0831    nitroGLYCERIN SL tablet 0.4 mg, 0.4 mg, Sublingual, Q5 Min PRN, Yung Kang, FNP    ondansetron disintegrating tablet 4 mg, 4 mg, Oral, Q6H PRN, Yung Collinsehart, FNP    ondansetron disintegrating tablet 8 mg, 8 mg,  "Oral, Q6H PRN, Yung Kang, FNP    pantoprazole EC tablet 40 mg, 40 mg, Oral, Daily, Yung Lillyart, FNP, 40 mg at 08/28/22 0832    polyethylene glycol packet 17 g, 17 g, Oral, BID PRN, Yung Collinsehart, FNP, 17 g at 08/24/22 1001    promethazine tablet 25 mg, 25 mg, Oral, Q6H PRN, Yung Collinsehart, FNP    triamterene-hydrochlorothiazide 37.5-25 mg per tablet 1 tablet, 1 tablet, Oral, Daily, Yung Kang, FNP, 1 tablet at 08/28/22 0831    vitamin D 1000 units tablet 5,000 Units, 5,000 Units, Oral, Daily, Yung Lillyart, FNP, 5,000 Units at 08/28/22 0831     Review of Systems   Complete 12-point review of symptoms negative except for what's mentioned in HPI     Objective:     BP (!) 150/76   Pulse (!) 59   Temp 98.3 °F (36.8 °C) (Oral)   Resp 16   Ht 5' 6" (1.676 m)   Wt 107.7 kg (237 lb 7 oz)   SpO2 98%   BMI 38.32 kg/m²        Intake/Output Summary (Last 24 hours) at 8/28/2022 1011  Last data filed at 8/28/2022 0702  Gross per 24 hour   Intake 1320 ml   Output --   Net 1320 ml     Physical Exam  Constitutional:       Appearance: Normal appearance.   HENT:      Head: Normocephalic.      Mouth/Throat:      Mouth: Mucous membranes are moist.   Eyes:      Pupils: Pupils are equal, round, and reactive to light.   Cardiovascular:      Rate and Rhythm: Normal rate and regular rhythm.      Heart sounds: Normal heart sounds.      Comments: BL LE trace edema.  Pulmonary:      Effort: Pulmonary effort is normal.      Breath sounds: Normal breath sounds.   Abdominal:      General: Bowel sounds are normal.      Palpations: Abdomen is soft.   Musculoskeletal:      Cervical back: Neck supple.      Comments: Generalized weakness and muscle atrophy. Lumbar dressing clean and intact.  Incision looks good with no drainage.  Steri-Strips intact.    Skin:     General: Skin is warm and dry.   Neurological:      General: No focal deficit present.      Mental Status: She is alert and oriented to " person, place, and time.   Psychiatric:         Mood and Affect: Mood normal.         Behavior: Behavior normal.         Thought Content: Thought content normal.         Judgment: Judgment normal.     Labs  No results found for this or any previous visit (from the past 24 hour(s)).    Radiology  MRI lumbar spine with and without contrast on 8/17/2022, IMPRESSION: Severe canal stenosis at L2-L3 and L3-L4, moderate at L4-L5, with dorsal epidural fluid collection. Multilevel neural foraminal stenoses as described. Postoperative fluid in the right sided laminectomy beds and subcutaneous soft tissues.     Assessment/Plan:     62 y.o. WF admitted on 8/22/2022    Lumbar radiculopathy   - s/p right L2-3 and L3-4 MIS hemilaminectomy and diskectomy on 8/15  - s/p revision of right L2-3, L3-4 MIS with removal of hematoma on 8/18/2022  - change Robaxin 500 mg t.i.d. scheduled to 500 mg t.i.d. as needed (changed from scheduled to as needed on 08/28)  - continue                Lovenox 40 mg daily                Gabapentin 600 mg t.i.d.                Cymbalta 60 mg daily                 Decadron taper                Norco 10 mg/325 mg q.4 hours p.r.n.  - defer to physiatry for rehab and pain management  - PT/OT/RT/ST following     Vitamin-D deficiency   - obtain vitamin-D level with next labs  - continue                Vitamin-D 5000 units daily     GERD  - Avoid spicy foods, and nothing to eat or drink within x2 hours of bedtime or laying flat (water is ok)   - Avoid NSAIDs (Advil, ibuprofen, naproxen...) and ramirez-2 inhibitors (Mobic, Celebrex)    - continue               Protonix 40 mg daily                Pepcid 20 mg BID prn     Constipation  - last BM 8/25  - continue                Movantik 25 mg daily (initiated 8/23)                MiraLax 17 g daily     HTN  - BP mod control  - continue    Norvasc 10 mg daily (increased 8/26)               Triamterene/HCTZ 37.5 mg-25 mg daily               Hydralazine 10 mg every 2 hours  as needed for BP > 160/90               Labetalol 10 mg every 2 hours as needed for BP > 160/90     NINA  - stable   - compliant with CPAP at home     Normocytic anemia  - asymptomatic  - H/H stable   - no evidence of active bleeds  - will closely monitor and transfuse if needed     AB therapy  Cipro 8/24-8/27     VTE Prophylaxis:  Lovenox 40 mg daily  COVID-19 testing:  Negative on 08/22/2022  COVID-19 vaccination status:  Vaccinated (Pfizer):  03/05/2021, 03/06/2021, and 12/21/2021     POA: No  Living will: No  Contacts:    Petr Dixon () [phone]                      Shadia Javier (daughter) [phone]     CODE STATUS: Full Code  Internal Medicine (attending): Roderick Serna MD  Physiatry (consulting):  Arsh Wood MD     OUTPATIENT PROVIDERS  PCP: Kalli Sweeney MD  Neuro surgery: Osvaldo Aquino MD  Cardiology: Karl Jonas MD     DISPOSITION: Condition stable.  Sleep hygiene, bowel minutes, and appetite at goal.  Complaining of feeling sedated and suspect side effect of current pain regimen.  Requesting dosage adjustment.  Last recorded BM on 08/28.  Mild systolic hypertension, but prior readings at goal.  Vitals otherwise stable with no recent recorded fevers.  Good glycemic control.  No new labs or imaging.  Change methocarbamol from 500 mg t.i.d. scheduled to 500 mg t.i.d. as needed (per patient request).  Elevated blood pressures likely secondary to Decadron.  Continue taper as prescribed.  Labetalol and hydralazine IV p.r.n. and place.  Ensure compliance with low-sodium diet.  Out of bed WA as tolerated.  Encourage nutrition and hydration.  Continue aggressive therapies and nutritional support.  Monitor closely and notify with any acute changes.  Labs in am.    Total time spent on this encounter including chart review and direct 1-on-1 patient interaction: 39 minutes   Over 50% of this time was spent in counseling and coordination of care

## 2022-08-28 NOTE — PT/OT/SLP PROGRESS
Occupational Therapy Inpatient Rehab Treatment    Name: Marina Dixon  MRN: [id number]    Assessment:  Marina Dixon is a 62 y.o. female admitted with a medical diagnosis of Lumbar radiculopathy.  She presents with the following impairments/functional limitations:  pain, decreased ROM, orthopedic precautions.    General Precautions: Standard, fall     Orthopedic Precautions:spinal precautions     Braces: LSO    Rehab Prognosis: Good; patient would benefit from acute skilled OT services to address these deficits and reach maximum level of function.      History:     Past Medical History:   Diagnosis Date    Arthritis     Endometriosis of uterus 2002    GERD (gastroesophageal reflux disease)     Hypertension 2020    Menopause 2002    Hysterectomy    NINA (obstructive sleep apnea)     wears cpap    Sciatica        Past Surgical History:   Procedure Laterality Date    BREAST BIOPSY Right 6/27/2022    Procedure: BIOPSY, BREAST, WITH LUMPECTOMY / Right Major duct excision;  Surgeon: Sintia Red MD;  Location: [facility];  Service: General;  Laterality: Right;    CATARACT EXTRACTION W/ INTRAOCULAR LENS  IMPLANT, BILATERAL      HYSTERECTOMY  2002    LUMBAR LAMINECTOMY N/A 8/18/2022    Procedure: LAMINECTOMY, SPINE, LUMBAR;  Surgeon: Osvaldo Aquino MD;  Location: [facility];  Service: Neurosurgery;  Laterality: N/A;  open L2/3, L3/4 laminectomy  drainage of fluid    SKIN BIOPSY Right     breast       Subjective     Orientation: Oriented x4    Chief Complaint: some aching pain of back/legs    Patient/Family Comments/goals: return to PLOF        Respiratory Status: Room air    Patients cultural, spiritual, Baptism conflicts given the current situation: no       Objective:     Patient found up in chair with    upon OT entry to room.    Mobility   Patient completed:  Supine to Sit with independence  Sit to Supine with independence  Sit to Stand Transfer with independence with rolling walker  Stand to Sit Transfer with  independence with rolling walker    Functional Mobility  Mobilized eob>gym and back with RW      Therapeutic Activities  Performed dynamic standing at // bars with focus on core and balance stability. Performing plie squats and 1 legged stance with opposite LE moving in abduct/adduct    Therapeutic Exercise  BLE stretching of gastroc, hamstrings and pyriformis to reduce pain. Pt able to perform on her own after initial instruction and within pxns.         LifeStyle Change and Education:             Patient left up in chair with all lines intact and call button in reach.     Education provided: transfer training, body mechanics, and fall prevention    Multidisciplinary Problems       Occupational Therapy Goals          Problem: Occupational Therapy    Goal Priority Disciplines Outcome Interventions   Occupational Therapy Goal     OT, PT/OT Ongoing, Progressing    Description: ADLs:  Pt to perform grooming tasks with independently and standing at sink.  Pt to perform UB dressing with set up.   Pt to perform LB dressing with set up with AE as needed   Pt to perform putting on/off footwear task with set up and AE as needed.  Pt to perform toileting with set up using toilet aide as needed.    Functional Transfers:  Pt to perform toilet transfers with set up and RW  Pt to perform a walk-in shower transfer with set up and RW    IADLs:  Pt to perform simple meal prep with supervision.    Balance, Strengthening, Endurance, Balance:  Pt to consistently demonstrate adherence to orthopedic precautions during all ADL's as instructed by OT.  Pt to demonstrate good dynamic standing balance as required to perform ADL's from standing level.  Pt to demonstrate good BUE strength during functional task   Pt to demonstrate consistent adherence to breathing control and energy conservation techniques as educated by OT.                        Time Tracking     OT Received On: 08/28/22  Time In 1005     Time Out 1035  Total Time 30  min  Therapy Time: OT Individual: 30  Missed Time:    Missed Time Reason:      Billable Minutes: Therapeutic Activity 30    08/28/2022

## 2022-08-29 LAB
ABS NEUT CALC (OHS): 16.24 X10(3)/MCL (ref 2.1–9.2)
ALBUMIN SERPL-MCNC: 3.5 GM/DL (ref 3.4–4.8)
ALBUMIN/GLOB SERPL: 1.1 RATIO (ref 1.1–2)
ALP SERPL-CCNC: 82 UNIT/L (ref 40–150)
ALT SERPL-CCNC: 60 UNIT/L (ref 0–55)
AST SERPL-CCNC: 27 UNIT/L (ref 5–34)
BILIRUBIN DIRECT+TOT PNL SERPL-MCNC: 0.4 MG/DL
BUN SERPL-MCNC: 30.9 MG/DL (ref 9.8–20.1)
CALCIUM SERPL-MCNC: 9.2 MG/DL (ref 8.4–10.2)
CHLORIDE SERPL-SCNC: 98 MMOL/L (ref 98–107)
CO2 SERPL-SCNC: 30 MMOL/L (ref 23–31)
CREAT SERPL-MCNC: 1.1 MG/DL (ref 0.55–1.02)
ERYTHROCYTE [DISTWIDTH] IN BLOOD BY AUTOMATED COUNT: 13.3 % (ref 11.5–17)
GFR SERPLBLD CREATININE-BSD FMLA CKD-EPI: 56 MLS/MIN/1.73/M2
GLOBULIN SER-MCNC: 3.3 GM/DL (ref 2.4–3.5)
GLUCOSE SERPL-MCNC: 131 MG/DL (ref 82–115)
HCT VFR BLD AUTO: 39.1 % (ref 37–47)
HGB BLD-MCNC: 12.9 GM/DL (ref 12–16)
LYMPHOCYTES NFR BLD MANUAL: 14 % (ref 13–40)
LYMPHOCYTES NFR BLD MANUAL: 2.77 X10(3)/MCL
MAGNESIUM SERPL-MCNC: 2.6 MG/DL (ref 1.6–2.6)
MCH RBC QN AUTO: 29.7 PG (ref 27–31)
MCHC RBC AUTO-ENTMCNC: 33 MG/DL (ref 33–36)
MCV RBC AUTO: 89.9 FL (ref 80–94)
METAMYELOCYTES NFR BLD MANUAL: 2 %
MONOCYTES NFR BLD MANUAL: 0.79 X10(3)/MCL (ref 0.1–1.3)
MONOCYTES NFR BLD MANUAL: 4 % (ref 2–11)
NEUTROPHILS NFR BLD MANUAL: 80 % (ref 47–80)
PHOSPHATE SERPL-MCNC: 4.8 MG/DL (ref 2.3–4.7)
PLATELET # BLD AUTO: 429 X10(3)/MCL (ref 130–400)
PLATELET # BLD EST: ABNORMAL 10*3/UL
PMV BLD AUTO: 9.5 FL (ref 7.4–10.4)
POTASSIUM SERPL-SCNC: 4.9 MMOL/L (ref 3.5–5.1)
PREALB SERPL-MCNC: 40.6 MG/DL (ref 14–37)
PROT SERPL-MCNC: 6.8 GM/DL (ref 5.8–7.6)
RBC # BLD AUTO: 4.35 X10(6)/MCL (ref 4.2–5.4)
RBC MORPH BLD: NORMAL
SODIUM SERPL-SCNC: 137 MMOL/L (ref 136–145)
WBC # SPEC AUTO: 19.8 X10(3)/MCL (ref 4.5–11.5)

## 2022-08-29 PROCEDURE — 97168 OT RE-EVAL EST PLAN CARE: CPT

## 2022-08-29 PROCEDURE — 25000003 PHARM REV CODE 250: Performed by: NURSE PRACTITIONER

## 2022-08-29 PROCEDURE — 80053 COMPREHEN METABOLIC PANEL: CPT | Performed by: NURSE PRACTITIONER

## 2022-08-29 PROCEDURE — 97110 THERAPEUTIC EXERCISES: CPT

## 2022-08-29 PROCEDURE — 85025 COMPLETE CBC W/AUTO DIFF WBC: CPT | Performed by: NURSE PRACTITIONER

## 2022-08-29 PROCEDURE — 83735 ASSAY OF MAGNESIUM: CPT | Performed by: NURSE PRACTITIONER

## 2022-08-29 PROCEDURE — 97535 SELF CARE MNGMENT TRAINING: CPT

## 2022-08-29 PROCEDURE — 97530 THERAPEUTIC ACTIVITIES: CPT

## 2022-08-29 PROCEDURE — 97116 GAIT TRAINING THERAPY: CPT

## 2022-08-29 PROCEDURE — 11800000 HC REHAB PRIVATE ROOM

## 2022-08-29 PROCEDURE — 25000003 PHARM REV CODE 250: Performed by: INTERNAL MEDICINE

## 2022-08-29 PROCEDURE — 63600175 PHARM REV CODE 636 W HCPCS: Performed by: NURSE PRACTITIONER

## 2022-08-29 PROCEDURE — 36415 COLL VENOUS BLD VENIPUNCTURE: CPT | Performed by: NURSE PRACTITIONER

## 2022-08-29 PROCEDURE — 94799 UNLISTED PULMONARY SVC/PX: CPT

## 2022-08-29 PROCEDURE — 97164 PT RE-EVAL EST PLAN CARE: CPT

## 2022-08-29 PROCEDURE — 84134 ASSAY OF PREALBUMIN: CPT | Performed by: NURSE PRACTITIONER

## 2022-08-29 PROCEDURE — 84100 ASSAY OF PHOSPHORUS: CPT | Performed by: NURSE PRACTITIONER

## 2022-08-29 RX ADMIN — DULOXETINE 60 MG: 30 CAPSULE, DELAYED RELEASE ORAL at 08:08

## 2022-08-29 RX ADMIN — HYDROCODONE BITARTRATE AND ACETAMINOPHEN 1 TABLET: 10; 325 TABLET ORAL at 05:08

## 2022-08-29 RX ADMIN — METHOCARBAMOL 500 MG: 500 TABLET ORAL at 02:08

## 2022-08-29 RX ADMIN — ENOXAPARIN SODIUM 40 MG: 40 INJECTION SUBCUTANEOUS at 05:08

## 2022-08-29 RX ADMIN — GABAPENTIN 600 MG: 300 CAPSULE ORAL at 09:08

## 2022-08-29 RX ADMIN — TRIAMTERENE AND HYDROCHLOROTHIAZIDE 1 TABLET: 37.5; 25 TABLET ORAL at 08:08

## 2022-08-29 RX ADMIN — NALOXEGOL OXALATE 25 MG: 25 TABLET, FILM COATED ORAL at 08:08

## 2022-08-29 RX ADMIN — Medication 5000 UNITS: at 08:08

## 2022-08-29 RX ADMIN — AMLODIPINE BESYLATE 10 MG: 5 TABLET ORAL at 08:08

## 2022-08-29 RX ADMIN — PANTOPRAZOLE SODIUM 40 MG: 40 TABLET, DELAYED RELEASE ORAL at 08:08

## 2022-08-29 RX ADMIN — GABAPENTIN 600 MG: 300 CAPSULE ORAL at 02:08

## 2022-08-29 RX ADMIN — DEXAMETHASONE 4 MG: 4 TABLET ORAL at 08:08

## 2022-08-29 RX ADMIN — GABAPENTIN 600 MG: 300 CAPSULE ORAL at 05:08

## 2022-08-29 NOTE — PLAN OF CARE
Met with pt then called  Petr ([phone]) to discuss updates from team conference and discharge planned tomorrow, 8/30/22.  Both are in agreement with discharge plan, plans for OP PT at [facility], family training (scheduled with Petr tomorrow at 1030), and DME orders (RW, BSC).    Sent referral for outpatient PT to Ramon at [facility] (phone [phone]; fax [phone].  Sent face sheet, order, H&P, NS consult report, and PT eval/notes to above and requested they call patient to schedule.      Sent order for RW and BSC to Laura at [facility] ([phone]) via [employer].  Requested delivery to room by 0945 tomorrow and requested that she relay to me if 100% covered by insurance.

## 2022-08-29 NOTE — PROGRESS NOTES
[facility] ([facility])  Rehab Progress Note    Patient Name: Marina Dixon  MRN: [id number]  Age: 62 y.o. Sex: female  : 1960  Hospital Length of Stay: 7 days  Date of Service: 2022   Chief Complaint: Lumbar radiculopathy s/p revision of right L2-3, L3-4 MIS with removal of hematoma on 2022    Subjective:     Basic Information  Admit Information: 62-year-old white female presented to [facility] ED on 2022 complaining of buttocks spasms, back pain, and leg pain that worsened on  s/p right L2-3 and L3-4 MIS hemilaminectomy and diskectomy on 8/15.  Denied bowel/bladder dysfunction.  PMH significant for NINA, lumbar stenosis, and HTN.  Imaging significant for fluid collection with pressure of the thecal sac.  Tolerated revision of right L2-3, L3-4 MIS with removal of hematoma on  without perioperative complications.  Robaxin initiated without relief of muscle spasms.  Valium and Decadron initiated on  with improvement pain/spasms.  Able to mobilize on  with adjustment of medications.  Tolerated transfer to [facility] inpatient rehab unit on  without incident.  Today's Information: No acute events overnight.  Mobilizing in the room therapy.  Reports good sleep and appetite.  Last BM .  Vital signs at goal with no recorded fevers.  BP moderately controlled.  Mild leukocytosis 2/2 steroids.  Denies dysuria.  Renal indices trending up slightly.  Pre-albumin greater than 40.  No imaging today.    Review of patient's allergies indicates:   Allergen Reactions    Sulfa (sulfonamide antibiotics) Itching and Rash        Current Facility-Administered Medications:     acetaminophen tablet 650 mg, 650 mg, Oral, Q4H PRN, Yung A Jorge Luis, FNP, 650 mg at 22 2343    ALPRAZolam tablet 0.25 mg, 0.25 mg, Oral, TID PRN, Yung A Jorge Luis, FNP    amLODIPine tablet 10 mg, 10 mg, Oral, Daily, Yung A Jorge Luis, FNP, 10 mg at 22 0859    benzonatate  capsule 100 mg, 100 mg, Oral, TID PRN, Yung BROCK Jorge Luis, FNP    bisacodyL suppository 10 mg, 10 mg, Rectal, Daily PRN, Yung A Jorge Luis, FNP    [START ON 9/1/2022] dexAMETHasone tablet 2 mg, 2 mg, Oral, Daily, Yung A Jorge Luis, FNP    dexAMETHasone tablet 4 mg, 4 mg, Oral, Daily, Yung BROCK Jorge Luis, FNP, 4 mg at 08/29/22 0858    DULoxetine DR capsule 60 mg, 60 mg, Oral, Daily, Yung A Jorge Luis, FNP, 60 mg at 08/29/22 0859    enoxaparin injection 40 mg, 40 mg, Subcutaneous, Daily, Yung A Jorge Luis, FNP, 40 mg at 08/28/22 1712    famotidine tablet 20 mg, 20 mg, Oral, BID PRN, Yung BROCK Jorge Luis, FNP, 20 mg at 08/23/22 0630    gabapentin capsule 600 mg, 600 mg, Oral, TID, Yung BROCK Jorge Luis, FNP, 600 mg at 08/29/22 0554    hydrALAZINE injection 10 mg, 10 mg, Intravenous, Q4H PRN, Yung A Jorge Luis, FNP    HYDROcodone-acetaminophen  mg per tablet 1 tablet, 1 tablet, Oral, Q6H PRN, Yung A Jorge Luis, FNP, 1 tablet at 08/28/22 1933    hydrOXYzine pamoate capsule 50 mg, 50 mg, Oral, Nightly PRN, Yung BROCK Jorge Luis, FNP, 50 mg at 08/25/22 2119    labetalol 20 mg/4 mL (5 mg/mL) IV syring, 10 mg, Intravenous, Q4H PRN, Yung A Jorge Luis, FNP    methocarbamoL tablet 500 mg, 500 mg, Oral, TID PRN, Roderick Serna MD    metoprolol injection 10 mg, 10 mg, Intravenous, Q2H PRN, Yugn A Jorge Luis, FNP    naloxegoL (MOVANTIK) tablet 25 mg, 25 mg, Oral, Daily, Yung A Jorge Luis, FNP, 25 mg at 08/29/22 0859    nitroGLYCERIN SL tablet 0.4 mg, 0.4 mg, Sublingual, Q5 Min PRN, Yung A Jorge Luis, FNP    ondansetron disintegrating tablet 4 mg, 4 mg, Oral, Q6H PRN, Yung A Jorge Luis, FNP    ondansetron disintegrating tablet 8 mg, 8 mg, Oral, Q6H PRN, Yung A Jorge Luis, FNP    pantoprazole EC tablet 40 mg, 40 mg, Oral, Daily, Yung A Jorge Luis, FNP, 40 mg at 08/29/22 0858    polyethylene glycol packet 17 g, 17 g, Oral, BID PRN, Yung A Jorge Luis, FNP, 17 g at 08/24/22 1001    promethazine tablet 25  "mg, 25 mg, Oral, Q6H PRN, Yung Kang, FNP    triamterene-hydrochlorothiazide 37.5-25 mg per tablet 1 tablet, 1 tablet, Oral, Daily, Yung Kang, FNP, 1 tablet at 08/29/22 0859    vitamin D 1000 units tablet 5,000 Units, 5,000 Units, Oral, Daily, Yung Kang, FNP, 5,000 Units at 08/29/22 0858     Review of Systems   Complete 12-point review of symptoms negative except for what's mentioned in HPI     Objective:     BP (!) 151/81   Pulse (!) 56   Temp 97.9 °F (36.6 °C) (Oral)   Resp 16   Ht 5' 6" (1.676 m)   Wt 107.7 kg (237 lb 7 oz)   SpO2 97%   BMI 38.32 kg/m²        Intake/Output Summary (Last 24 hours) at 8/29/2022 0953  Last data filed at 8/29/2022 0900  Gross per 24 hour   Intake 1080 ml   Output --   Net 1080 ml     Physical Exam  Constitutional:       Appearance: Normal appearance.   HENT:      Head: Normocephalic.      Mouth/Throat:      Mouth: Mucous membranes are moist.   Eyes:      Pupils: Pupils are equal, round, and reactive to light.   Cardiovascular:      Rate and Rhythm: Normal rate and regular rhythm.      Heart sounds: Normal heart sounds.      Comments: BL LE trace edema.  Pulmonary:      Effort: Pulmonary effort is normal.      Breath sounds: Normal breath sounds.   Abdominal:      General: Bowel sounds are normal.      Palpations: Abdomen is soft.   Musculoskeletal:      Cervical back: Neck supple.      Comments: Generalized weakness and muscle atrophy. Lumbar dressing clean and intact.  Incision looks good with no drainage.  Steri-Strips intact.    Skin:     General: Skin is warm and dry.   Neurological:      General: No focal deficit present.      Mental Status: She is alert and oriented to person, place, and time.   Psychiatric:         Mood and Affect: Mood normal.         Behavior: Behavior normal.         Thought Content: Thought content normal.         Judgment: Judgment normal.   *MD performed and documented physical examination       Labs  Recent Results " (from the past 24 hour(s))   Prealbumin    Collection Time: 08/29/22  5:25 AM   Result Value Ref Range    Prealbumin 40.6 (H) 14.0 - 37.0 mg/dL   Comprehensive Metabolic Panel    Collection Time: 08/29/22  5:25 AM   Result Value Ref Range    Sodium Level 137 136 - 145 mmol/L    Potassium Level 4.9 3.5 - 5.1 mmol/L    Chloride 98 98 - 107 mmol/L    Carbon Dioxide 30 23 - 31 mmol/L    Glucose Level 131 (H) 82 - 115 mg/dL    Blood Urea Nitrogen 30.9 (H) 9.8 - 20.1 mg/dL    Creatinine 1.10 (H) 0.55 - 1.02 mg/dL    Calcium Level Total 9.2 8.4 - 10.2 mg/dL    Protein Total 6.8 5.8 - 7.6 gm/dL    Albumin Level 3.5 3.4 - 4.8 gm/dL    Globulin 3.3 2.4 - 3.5 gm/dL    Albumin/Globulin Ratio 1.1 1.1 - 2.0 ratio    Bilirubin Total 0.4 <=1.5 mg/dL    Alkaline Phosphatase 82 40 - 150 unit/L    Alanine Aminotransferase 60 (H) 0 - 55 unit/L    Aspartate Aminotransferase 27 5 - 34 unit/L    eGFR 56 mls/min/1.73/m2   Magnesium    Collection Time: 08/29/22  5:25 AM   Result Value Ref Range    Magnesium Level 2.60 1.60 - 2.60 mg/dL   Phosphorus    Collection Time: 08/29/22  5:25 AM   Result Value Ref Range    Phosphorus Level 4.8 (H) 2.3 - 4.7 mg/dL   CBC with Differential    Collection Time: 08/29/22  5:25 AM   Result Value Ref Range    WBC 19.8 (H) 4.5 - 11.5 x10(3)/mcL    RBC 4.35 4.20 - 5.40 x10(6)/mcL    Hgb 12.9 12.0 - 16.0 gm/dL    Hct 39.1 37.0 - 47.0 %    MCV 89.9 80.0 - 94.0 fL    MCH 29.7 27.0 - 31.0 pg    MCHC 33.0 33.0 - 36.0 mg/dL    RDW 13.3 11.5 - 17.0 %    Platelet 429 (H) 130 - 400 x10(3)/mcL    MPV 9.5 7.4 - 10.4 fL   Manual Differential    Collection Time: 08/29/22  5:25 AM   Result Value Ref Range    Neut Man 80 47 - 80 %    Lymph Man 14 13 - 40 %    Monocyte Man 4 2 - 11 %    [place] Man 2 %    Abs Neut calc 16.236 (H) 2.1 - 9.2 x10(3)/mcL    Abs Mono 0.792 0.1 - 1.3 x10(3)/mcL    Abs Lymp 2.772 0.6 - 4.6 x10(3)/mcL    RBC Morph Normal Normal    Platelet Est Increased (A) Normal, Adequate       Radiology  MRI lumbar  spine with and without contrast on 8/17/2022, IMPRESSION: Severe canal stenosis at L2-L3 and L3-L4, moderate at L4-L5, with dorsal epidural fluid collection. Multilevel neural foraminal stenoses as described. Postoperative fluid in the right sided laminectomy beds and subcutaneous soft tissues.     Assessment/Plan:     62 y.o. WF admitted on 8/22/2022    Lumbar radiculopathy   - s/p right L2-3 and L3-4 MIS hemilaminectomy and diskectomy on 8/15  - s/p revision of right L2-3, L3-4 MIS with removal of hematoma on 8/18/2022  - change Robaxin 500 mg t.i.d. scheduled to 500 mg t.i.d. as needed (changed from scheduled to as needed on 08/28)  - continue                Lovenox 40 mg daily                Gabapentin 600 mg t.i.d.                Cymbalta 60 mg daily                 Decadron taper                Norco 10 mg/325 mg q.4 hours p.r.n.  - defer to physiatry for rehab and pain management  - PT/OT/RT/ST following     Vitamin-D deficiency   - obtain vitamin-D level with next labs  - continue                Vitamin-D 5000 units daily     GERD  - Avoid spicy foods, and nothing to eat or drink within x2 hours of bedtime or laying flat (water is ok)   - Avoid NSAIDs (Advil, ibuprofen, naproxen...) and ramirez-2 inhibitors (Mobic, Celebrex)    - continue               Protonix 40 mg daily                Pepcid 20 mg BID prn     Constipation  - stable  - continue                Movantik 25 mg daily (initiated 8/23)                MiraLax 17 g daily     HTN  - BP mod control  - continue    Norvasc 10 mg daily (increased 8/26)               Triamterene/HCTZ 37.5 mg-25 mg daily               Hydralazine 10 mg every 2 hours as needed for BP > 160/90               Labetalol 10 mg every 2 hours as needed for BP > 160/90     NINA  - stable   - compliant with CPAP at home     Normocytic anemia  - asymptomatic  - H/H stable   - no evidence of active bleeds  - will closely monitor and transfuse if needed     AB therapy  Cipro 8/24-8/27      VTE Prophylaxis:  Lovenox 40 mg daily  COVID-19 testing:  Negative on 08/22/2022  COVID-19 vaccination status:  Vaccinated (Pfizer):  03/05/2021, 03/06/2021, and 12/21/2021     POA: No  Living will: No  Contacts:    Petr Dixon () [phone]                      Shadia Javier (daughter) [phone]     CODE STATUS: Full Code  Internal Medicine (attending): Roderick Serna MD  Physiatry (consulting):  Arsh Wood MD     OUTPATIENT PROVIDERS  PCP: Kalli Sweeney MD  Neuro surgery: Osvaldo Aquino MD  Cardiology: Karl Jonas MD     DISPOSITION: Condition stable.  Sleep hygiene, bowel minutes, and appetite at goal.  Vital signs at goal with no recorded fevers.  BP moderately controlled 2/2 Decadron.  Reactive leukocytosis (WBC 19.8).  Likely due to steroids.  Renal indices are all stable.  No imaging today.  Continue aggressive mobilization as tolerated.  Monitor closely.  Notify of acute changes.  Staffing completed today.      Staffing 08/29/2022: Continent of bowel and bladder. Appetite is good.  Not enrolled with RT. PT: Overall independent.  High fall risk, but improving.  Ambulating 600 feet.  Needs 30 min family training.  OT: Independent.  Working on IADLs. Projected discharge 8/30 home with HH.      Ravin Kang NP conducted independent physical examination and assisted with medical documentation.    Total time spent on this encounter including chart review and direct MD + NP 1-on-1 patient interaction: 44 minutes   Over 50% of this time was spent in counseling and coordination of care

## 2022-08-29 NOTE — PT/OT/SLP RE-EVAL
Physical Therapy Rehab Re-Evaluation    Patient Name:  Marina Dixon   MRN:  [id number]    Recommendations:     Discharge Recommendations:  other (see comments) (pending progress)   Discharge Equipment Recommendations:     Barriers to discharge: None    Assessment:     Marina Dixon is a 62 y.o. female admitted with a medical diagnosis of Lumbar radiculopathy.  She presents with the following impairments/functional limitations:  weakness, impaired endurance, impaired functional mobility, gait instability, impaired balance, decreased safety awareness, pain.    Rehab Diagnosis:     Recent Surgery: * No surgery found *      General Precautions: Standard, fall     Orthopedic Precautions: spinal precautions     Braces: LSO (OOB)    Rehab Prognosis: Good; patient would benefit from acute skilled PT services to address these deficits and reach maximum level of function.      History:     Past Medical History:   Diagnosis Date    Arthritis     Endometriosis of uterus 2002    GERD (gastroesophageal reflux disease)     Hypertension 2020    Menopause 2002    Hysterectomy    NINA (obstructive sleep apnea)     wears cpap    Sciatica        Past Surgical History:   Procedure Laterality Date    BREAST BIOPSY Right 6/27/2022    Procedure: BIOPSY, BREAST, WITH LUMPECTOMY / Right Major duct excision;  Surgeon: Sintia Red MD;  Location: [facility];  Service: General;  Laterality: Right;    CATARACT EXTRACTION W/ INTRAOCULAR LENS  IMPLANT, BILATERAL      HYSTERECTOMY  2002    LUMBAR LAMINECTOMY N/A 8/18/2022    Procedure: LAMINECTOMY, SPINE, LUMBAR;  Surgeon: Osvaldo Aquino MD;  Location: [facility];  Service: Neurosurgery;  Laterality: N/A;  open L2/3, L3/4 laminectomy  drainage of fluid    SKIN BIOPSY Right     breast       Subjective     Chief Complaint: N/A  Patient/Family Comments/goals: N/A    Vitals   Vitals at Rest  BP      HR    O2 Sat     Pain 1/10     Vitals With Activity  BP      HR    O2 Sat     Pain      Respiratory  Status: Room air    Patients cultural, spiritual, Yarsanism conflicts given the current situation: no       Living Environment  Lives With: spouse  Name(s) of Who Lives With Patient: Petr,   Home Accessibility: stairs to enter home  Number of Stairs, Main Entrance: two  Surface of Stairs, Main Entrance: concrete  Stair Railings, Main Entrance: none  Home Layout: Able to live on 1st floor  Transportation Anticipated: family or friend will provide  Equipment Currently Used at Home: CPAP    Prior Level of Function  Ambulation Skills: independent (Pt was independent with stairs prior to admit.)  Transfer Skills: independent  ADL Skills: independent  Work/Leisure Activity: independent    Equipment used at home: none.       Upon discharge, patient will have assistance from spouse.    Objective:     Communicated with RN prior to session.  Patient found up in chair with peripheral IVall needs in reach upon PT entry to room.    Exams    Tests and Measures:    Lower Extremity Strength:    Right LE  Left LE    Knee extension: 5 Knee extension: 5   Knee flexion: 5 Knee flexion: 5   Hip flexion: 5 Hip flexion: 5   Hip extension:  Not Tested Hip extension: Not Tested   Hip abduction: 5 Hip abduction: 5   Hip adduction: 5 Hip adduction: 5   Ankle dorsiflexion: 5 Ankle dorsiflexion: 5   Ankle plantarflexion: 5 Ankle plantarflexion: 5        Functional Mobility      GGs   Current   Status    Functional Area: Care Score:    Roll Left and Right 6 HOB flat   Sit to Lying 6 HOB flat   Lying to Sitting on Side of Bed 6 HOB flat   Sit to Stand 6 With RW; LSO donned   Chair/Bed-to-Chair Transfer 6 Performed with RW; Recliner <> w/c    Car Transfer 6 With RW   Walk 10 Feet     Walk 50 Feet with Two Turns     Walk 150 Feet     Walk 10 Feet Uneven Surface     1 Step (Curb)     4 Steps     12 Steps     Picking Up Object     Wheel 50 Feet with Two Turns     Wheel 150 Feet       Therapeutic Activities and Exercises:  Recumbent Bike 15  min at 1.2 resistance    Heel raises BLE with RW 3 x 15; overall SBA    Standing Hip flexion BLE with BUE support in RW 3 x 15; overall SBA    Standing Hip extension BLE with BUE support in RW 2 x 10; overall SBA    Standing Hip abduction BLE with BUE support in RW 3 x15; overall SBA    BLE marching with BUE support in RW 2x10; overall SBA    Activity Tolerance Good    Patient left up in chair with all lines intact and call button in reach.    Education Provided: roles and goals of PT/PTA, transfer training, bed mob, body mechanics, assistive device, and spinal precautions    Expected compliance: High compliance    GOALS:   Multidisciplinary Problems       Physical Therapy Goals          Problem: Physical Therapy    Goal Priority Disciplines Outcome Goal Variances Interventions   Physical Therapy Goal     PT, PT/OT Ongoing, Progressing     Description: Bed Mobility:  Roll left and right independently. (MET)  Sit to supine transfer independently. (MET)  Supine to sit transfer independently. (MET)    Transfers:  Sit to stand transfer with setup/clean-up assist using RW and LRAD. (MET)  Bed to chair transfer with setup/clean-up assist using RW and LRAD. (MET)  Car transfer with setup/clean-up assist using RW and LRAD. (MET)    Mobility:  Ambulate 200 feet with setup/clean-up assist using RW and LRAD. (MET)  Ambulate 10 feet on uneven surfaces/ramps with setup/clean-up assist using RW and LRAD. (MET)  Ascend/descend a 4 inch curb with setup/clean-up assist using RW and LRAD. (MET)  Ascend/descend 12 stairs with setup/clean-up assist using bilateral handrails. ( Pt has 2 steps with out rail at home) (MET)                       Plan:     During this hospitalization, patient to be seen 5 x/week to address the identified rehab impairments via gait training, therapeutic activities, therapeutic exercises and progress toward the following goals:    Plan of Care Expires:  08/29/22    Time Tracking:     PT Received On:  08/26/22  Time In 1300     Time Out 1330  Total Time 30 min  Therapy Time PT Received On: 08/26/22  PT Start Time: 1300  PT Stop Time: 1330  PT Total Time (min): 30 min  PT Individual: 30  Missed Time:    Time Missed due to:      Billable Minutes: Re-eval 15, Therapeutic Activity 10, and Therapeutic Exercise 35    08/29/2022

## 2022-08-29 NOTE — PLAN OF CARE
Met with pt then later called son Stefan ([phone]) to discuss updates from team conference and plans for discharge on Wed, 8/31/22, with HH via [facility] (her previous agency).    Scheduled family training with Stefan and Joe tomorrow, 8/31, at 1300.      Ordered RW from [place] via [employer] and requested deliver to room 8/31 by 1030.    Ordered HH PT/OT/ST/RN services with [facility] via [facility] and received confirmation of acceptance.

## 2022-08-29 NOTE — PLAN OF CARE
Problem: Physical Therapy  Goal: Physical Therapy Goal  Description: Bed Mobility: (MET)  Roll left and right independently.   Sit to supine transfer independently.  Supine to sit transfer independently.    Transfers: (MET)  Sit to stand transfer with setup/clean-up assist using RW and LRAD.  Bed to chair transfer with setup/clean-up assist using RW and LRAD.  Car transfer with setup/clean-up assist using RW and LRAD.    Mobility:  Ambulate 200 feet with setup/clean-up assist using RW and LRAD. (MET)  Ambulate 10 feet on uneven surfaces/ramps with setup/clean-up assist using RW and LRAD. (MET)  Ascend/descend a 4 inch curb with setup/clean-up assist using RW and LRAD. (MET)  Ascend/descend 12 stairs with setup/clean-up assist using bilateral handrails. ( Pt has 2 steps with out rail at home) (MET)  Ambulate 1000 feet independently using RW and LRAD.  Ambulate 150 feet on uneven surfaces/ramps independently using RW and LRAD.      8/29/2022 1508 by Maria C Moralez, ROSITA  Outcome: Ongoing, Progressing

## 2022-08-29 NOTE — PT/OT/SLP RE-EVAL
"Occupational Therapy Inpatient Rehab Re-Evaluation    Name: Marina Dixon  MRN: [id number]    Recommendations:     Discharge Recommendations:  home with home health   Discharge Equipment Recommendations: bedside commode, walker, rolling   Barriers to discharge: None    Assessment:  Marina Dixon is a 62 y.o. female admitted with a medical diagnosis of Lumbar radiculopathy.  She presents with the following impairments/functional limitations:  decreased upper extremity function.    General Precautions: Standard, fall     Orthopedic Precautions:spinal precautions     Braces: LSO    Rehab Prognosis: Good; patient would benefit from acute skilled OT services to address these deficits and reach maximum level of function.      History:     Past Medical History:   Diagnosis Date    Arthritis     Endometriosis of uterus 2002    GERD (gastroesophageal reflux disease)     Hypertension 2020    Menopause 2002    Hysterectomy    NINA (obstructive sleep apnea)     wears cpap    Sciatica        Past Surgical History:   Procedure Laterality Date    BREAST BIOPSY Right 6/27/2022    Procedure: BIOPSY, BREAST, WITH LUMPECTOMY / Right Major duct excision;  Surgeon: Sintia Red MD;  Location: [facility];  Service: General;  Laterality: Right;    CATARACT EXTRACTION W/ INTRAOCULAR LENS  IMPLANT, BILATERAL      HYSTERECTOMY  2002    LUMBAR LAMINECTOMY N/A 8/18/2022    Procedure: LAMINECTOMY, SPINE, LUMBAR;  Surgeon: Osvaldo Aquino MD;  Location: [facility];  Service: Neurosurgery;  Laterality: N/A;  open L2/3, L3/4 laminectomy  drainage of fluid    SKIN BIOPSY Right     breast       Subjective     Orientation: Oriented x4    Chief Complaint: Soerness    Patient/Family Comments/goals: "to go home soon"    Vitals  Vitals at Rest  Pain Pain Rating 1: 2/10  Location - Orientation 1: posterior  Location 1: back  Pain Addressed 1: Reposition  Pain Rating Post-Intervention 1: 1/10     Vitals With Activity  Pain Pain Rating 1: 2/10  Location - " Orientation 1: posterior  Location 1: back  Pain Addressed 1: Reposition  Pain Rating Post-Intervention 1: 1/10     Respiratory Status: Room air    Patients cultural, spiritual, Alevism conflicts given the current situation: no       Living Environment   Living Environment  Lives With: spouse  Name(s) of Who Lives With Patient: Petr   Home Accessibility: stairs to enter home  Number of Stairs, Main Entrance: two  Surface of Stairs, Main Entrance: concrete  Stair Railings, Main Entrance: none  Home Layout: Able to live on 1st floor  Transportation Anticipated: family or friend will provide  Equipment Currently Used at Home: none  Shower Setup: Walk-in shower    Prior Level of Function  BADL: Independent    IADL: Independent    Equipment used at home: CPAP.  DME owned (not currently used): none.      Upon discharge, patient will have assistance from .    Objective:     Patient found up in chair upon OT entry to room.    Mobility   Patient completed:  Sit to Stand Transfer with independence with rolling walker  Stand to Sit Transfer with independence with rolling walker  Toilet Transfer Stand Pivot and Step Transfer technique with independence with  rolling walker  Tub Transfer Stand Pivot and Step Transfer technique with independence with rolling walker  Shower Transfer Stand Pivot and Step Transfer technique with independence with rolling walker    Functional Mobility   Pt performed in room mobility independently with RW for ADLs. Then performed FM with RW in hallway and then downstairs and outside without a break.    ADLs     Current Status   Eating 6   Oral Hygiene 6   Shower, Bathe Self 6   Upper Body Dressing 6   Lower Body Dressing 6   Toileting Hygiene 6   Toilet Transfer 6   Putting On, Taking Off Footwear 6     Limiting Factors for ADLs:  none  IADLs: Independent    Exams     ROM: -       WFL    Hand Dominance: Right    ROM Hand  Left Hand: WFL  Right Hand: WFL    Strength  Overall Strength:           -       WFL    Sensation  Left:          -       WNL  Right:          -       WNL    Coordination:      -       Intact    Tone  Left: WNL  Right: WNL    Visual/Perceptual  Intact    Cognition:   WFL    Balance    Sitting  Sitting Surface: edge of chair  Static: No UE Support, Normal  Dynamic: No UE extremity support, Good (+)    Standing  Static: No UE Support, Good (+)  Dynamic: No UE extremity support, Normal    Righting Reaction:   WNL    Additional Treatments   While outside after ADLs, pt performed BUE there exercises with 2# hand wts in all planes 1 set of 20 reps. Pt walked from room to outside and back with RW. Reviewed safety precautions while outside to look for: obstacles in pathway, rugs at entrances of public building, uneven concrete, etc.    LifeStyle Change and Education     Patient left up in chair with  PT present.    Education provided: Roles and goals of OT, ADLs, transfer training, assistive device, modified goals, safety precautions, fall prevention, post-op precautions, equipment recommendations, and home safety    Multidisciplinary Problems       Occupational Therapy Goals       Not on file              Multidisciplinary Problems (Resolved)          Problem: Occupational Therapy    Goal Priority Disciplines Outcome Interventions   Occupational Therapy Goal   (Resolved)     OT, PT/OT Met    Description: ADLs:  Pt to perform grooming tasks with independently and standing at sink. MET  Pt to perform UB dressing with set up. MET  Pt to perform LB dressing with set up with AE as needed MET  Pt to perform putting on/off footwear task with set up and AE as needed.MET  Pt to perform toileting with set up using toilet aide as needed.MET    Functional Transfers:  Pt to perform toilet transfers with set up and RW MET   Pt to perform a walk-in shower transfer with set up and RW MET    IADLs:  Pt to perform simple meal prep with supervision.MET    Balance, Strengthening, Endurance, Balance:  Pt  to consistently demonstrate adherence to orthopedic precautions during all ADL's as instructed by OT. MET  Pt to demonstrate good dynamic standing balance as required to perform ADL's from standing level. MET  Pt to demonstrate good BUE strength during functional task MET  Pt to demonstrate consistent adherence to breathing control and energy conservation techniques as educated by OT. MET                       Plan     During this hospitalization, patient to be seen 5 x/week to address the identified rehab impairments via self-care/home management, community/work re-entry, therapeutic activities, therapeutic exercises and progress toward the following goals:    Plan of Care Expires:  09/05/22    Time Tracking     OT Received On: 08/29/22  Time In 0730     Time Out 0900  Total Time 90 min  Therapy Time: OT Individual: 90  Missed Time:    Missed Time Reason:      Billable Minutes: Re-eval 15, Self Care/Home Management 45, Therapeutic Activity 15, and Therapeutic Exercise 15    08/29/2022

## 2022-08-29 NOTE — PT/OT/SLP PROGRESS
Physical Therapy Inpatient Rehab Treatment    Patient Name:  Marina Dixon   MRN:  [id number]    Recommendations:     Discharge Recommendations:  other (see comments) (pending progress)   Discharge Equipment Recommendations:     Barriers to discharge: None    Assessment:     Marina Dixon is a 62 y.o. female admitted with a medical diagnosis of Lumbar radiculopathy.  She presents with the following impairments/functional limitations:  impaired endurance, impaired functional mobility, impaired balance, pain, gait instability .    Rehab Diagnosis:     Recent Surgery: * No surgery found *      General Precautions: Standard, fall     Orthopedic Precautions:spinal precautions     Braces: LSO    Rehab Prognosis: Good; patient would benefit from acute skilled PT services to address these deficits and reach maximum level of function.      History:     Past Medical History:   Diagnosis Date    Arthritis     Endometriosis of uterus 2002    GERD (gastroesophageal reflux disease)     Hypertension 2020    Menopause 2002    Hysterectomy    NINA (obstructive sleep apnea)     wears cpap    Sciatica        Past Surgical History:   Procedure Laterality Date    BREAST BIOPSY Right 6/27/2022    Procedure: BIOPSY, BREAST, WITH LUMPECTOMY / Right Major duct excision;  Surgeon: Sintia Red MD;  Location: [facility];  Service: General;  Laterality: Right;    CATARACT EXTRACTION W/ INTRAOCULAR LENS  IMPLANT, BILATERAL      HYSTERECTOMY  2002    LUMBAR LAMINECTOMY N/A 8/18/2022    Procedure: LAMINECTOMY, SPINE, LUMBAR;  Surgeon: Osvaldo Aquino MD;  Location: [facility];  Service: Neurosurgery;  Laterality: N/A;  open L2/3, L3/4 laminectomy  drainage of fluid    SKIN BIOPSY Right     breast       Subjective     Chief Complaint: Muscle soreness  Patient/Family Comments/goals: N/A    Vitals   Vitals at Rest  BP    HR    O2 Sat    Pain 2/10     Vitals With Activity  BP    HR    O2 Sat    Pain      Respiratory Status: Room air    Patients  cultural, spiritual, Anglican conflicts given the current situation: no      Objective:     Communicated with RN prior to session.  Patient found up in chair with  all needs in reach upon PT entry to room.    Pt is Oriented x3 and Alert, Cooperative, and Motivated.    Functional Mobility:        Current   Status   Discharge   Goal   Functional Area: Care Score:     Roll Left and Right    Independent   Sit to Lying    Independent   Lying to Sitting on Side of Bed    Independent   Sit to Stand 6 Performed in RW; LSO donned. Independent   Chair/Bed-to-Chair Transfer 6 With RW Independent   Car Transfer    Independent   Walk 10 Feet 6  Independent   Walk 50 Feet with Two Turns 6  Independent   Walk 150 Feet 6 Patient ambulated 300 ft with RW and no LOB Independent   Walk 10 Feet Uneven Surface 6 With RW Set-up/clean-up   1 Step (Curb) 6  Independent   4 Steps 6  Independent   12 Steps 6 With RW Independent   Picking Up Object 6 With RW & reacher Independent   Wheel 50 Feet with Two Turns    Not applicable   Wheel 150 Feet    Not applicable       Therapeutic Activities and Exercises:  5xSTS: 14 Secs; overall CGA  Timed Up and Go: 11 secs with RW; overall CGA    Pt Ascend/descend 4 stairs independently using right handrail.     Activity Tolerance good     Patient left up in chair with all lines intact and call button in reach.    Education provided: roles and goals of PT/PTA, gait training, stair training, balance training, body mechanics, assistive device, and spinal precautions    Expected compliance:    GOALS:   Multidisciplinary Problems       Physical Therapy Goals          Problem: Physical Therapy    Goal Priority Disciplines Outcome Goal Variances Interventions   Physical Therapy Goal     PT, PT/OT Ongoing, Progressing     Description: Bed Mobility:  Roll left and right independently.  Sit to supine transfer independently.  Supine to sit transfer independently.    Transfers:  Sit to stand transfer with  setup/clean-up assist using RW and LRAD.  Bed to chair transfer with setup/clean-up assist using RW and LRAD.  Car transfer with setup/clean-up assist using RW and LRAD.    Mobility:  Ambulate 200 feet with setup/clean-up assist using RW and LRAD.  Ambulate 10 feet on uneven surfaces/ramps with setup/clean-up assist using RW and LRAD.  Ascend/descend a 4 inch curb with setup/clean-up assist using RW and LRAD.   Ascend/descend 12 stairs with setup/clean-up assist using bilateral handrails. ( Pt has 2 steps with out rail at home)                        Plan:     During this hospitalization, patient to be seen 5 x/week to address the identified rehab impairments via gait training, therapeutic activities, therapeutic exercises and progress toward the following goals:    Plan of Care Expires:  08/29/22    Additional Information:         Time Tracking:     PT Received On: 08/29/22  Time In 1100     Time Out 1130  Total Time 30 min  Therapy Time PT Individual: 30  Missed Time:    Time Missed due to:      Billable Minutes: Gait Training 10 and Therapeutic Activity 20    08/29/2022

## 2022-08-29 NOTE — PLAN OF CARE
Problem: Occupational Therapy  Goal: Occupational Therapy Goal  Description: ADLs:  Pt to perform grooming tasks with independently and standing at sink. MET  Pt to perform UB dressing with set up. MET  Pt to perform LB dressing with set up with AE as needed MET  Pt to perform putting on/off footwear task with set up and AE as needed.MET  Pt to perform toileting with set up using toilet aide as needed.MET    Functional Transfers:  Pt to perform toilet transfers with set up and RW MET   Pt to perform a walk-in shower transfer with set up and RW MET    IADLs:  Pt to perform simple meal prep with supervision.MET    Balance, Strengthening, Endurance, Balance:  Pt to consistently demonstrate adherence to orthopedic precautions during all ADL's as instructed by OT. MET  Pt to demonstrate good dynamic standing balance as required to perform ADL's from standing level. MET  Pt to demonstrate good BUE strength during functional task MET  Pt to demonstrate consistent adherence to breathing control and energy conservation techniques as educated by OT. MET  Outcome: Met

## 2022-08-30 VITALS
SYSTOLIC BLOOD PRESSURE: 144 MMHG | TEMPERATURE: 98 F | BODY MASS INDEX: 38.16 KG/M2 | HEART RATE: 69 BPM | OXYGEN SATURATION: 99 % | DIASTOLIC BLOOD PRESSURE: 73 MMHG | WEIGHT: 237.44 LBS | HEIGHT: 66 IN | RESPIRATION RATE: 20 BRPM

## 2022-08-30 PROCEDURE — 63600175 PHARM REV CODE 636 W HCPCS: Performed by: NURSE PRACTITIONER

## 2022-08-30 PROCEDURE — 94799 UNLISTED PULMONARY SVC/PX: CPT

## 2022-08-30 PROCEDURE — 25000003 PHARM REV CODE 250: Performed by: NURSE PRACTITIONER

## 2022-08-30 PROCEDURE — 97530 THERAPEUTIC ACTIVITIES: CPT

## 2022-08-30 PROCEDURE — 97535 SELF CARE MNGMENT TRAINING: CPT

## 2022-08-30 RX ORDER — OMEPRAZOLE 20 MG/1
20 CAPSULE, DELAYED RELEASE ORAL DAILY
Qty: 30 CAPSULE | Refills: 0 | Status: SHIPPED | OUTPATIENT
Start: 2022-08-30 | End: 2022-09-29

## 2022-08-30 RX ORDER — METHOCARBAMOL 500 MG/1
500 TABLET, FILM COATED ORAL 3 TIMES DAILY PRN
Qty: 30 TABLET | Refills: 0 | Status: SHIPPED | OUTPATIENT
Start: 2022-08-30 | End: 2022-09-09

## 2022-08-30 RX ORDER — DEXAMETHASONE 2 MG/1
2 TABLET ORAL DAILY
Qty: 4 TABLET | Refills: 0 | Status: SHIPPED | OUTPATIENT
Start: 2022-09-01 | End: 2022-09-05

## 2022-08-30 RX ORDER — AMLODIPINE BESYLATE 10 MG/1
10 TABLET ORAL DAILY
Qty: 30 TABLET | Refills: 0 | Status: SHIPPED | OUTPATIENT
Start: 2022-08-31 | End: 2022-09-30

## 2022-08-30 RX ORDER — GABAPENTIN 300 MG/1
600 CAPSULE ORAL 3 TIMES DAILY
Qty: 180 CAPSULE | Refills: 0 | Status: SHIPPED | OUTPATIENT
Start: 2022-08-30 | End: 2022-09-29

## 2022-08-30 RX ORDER — DULOXETIN HYDROCHLORIDE 60 MG/1
60 CAPSULE, DELAYED RELEASE ORAL DAILY
Qty: 90 CAPSULE | Refills: 0 | Status: SHIPPED | OUTPATIENT
Start: 2022-08-30 | End: 2022-11-28

## 2022-08-30 RX ORDER — TRIAMTERENE/HYDROCHLOROTHIAZID 37.5-25 MG
1 TABLET ORAL EVERY MORNING
Qty: 30 TABLET | Refills: 2 | Status: SHIPPED | OUTPATIENT
Start: 2022-08-30 | End: 2022-11-28

## 2022-08-30 RX ORDER — GLUCOSAM/CHONDRO/HERB 149/HYAL 750-100 MG
2 TABLET ORAL DAILY
Qty: 180 CAPSULE | Refills: 0 | Status: SHIPPED | OUTPATIENT
Start: 2022-08-30 | End: 2022-11-28

## 2022-08-30 RX ORDER — CHOLECALCIFEROL (VITAMIN D3) 125 MCG
5000 CAPSULE ORAL DAILY
Qty: 90 CAPSULE | Refills: 0 | Status: SHIPPED | OUTPATIENT
Start: 2022-08-30 | End: 2022-11-28

## 2022-08-30 RX ORDER — HYDROCODONE BITARTRATE AND ACETAMINOPHEN 10; 325 MG/1; MG/1
1 TABLET ORAL EVERY 6 HOURS PRN
Qty: 12 TABLET | Refills: 0 | Status: SHIPPED | OUTPATIENT
Start: 2022-08-30 | End: 2022-09-04

## 2022-08-30 RX ADMIN — Medication 5000 UNITS: at 08:08

## 2022-08-30 RX ADMIN — NALOXEGOL OXALATE 25 MG: 25 TABLET, FILM COATED ORAL at 08:08

## 2022-08-30 RX ADMIN — DULOXETINE 60 MG: 30 CAPSULE, DELAYED RELEASE ORAL at 08:08

## 2022-08-30 RX ADMIN — PANTOPRAZOLE SODIUM 40 MG: 40 TABLET, DELAYED RELEASE ORAL at 08:08

## 2022-08-30 RX ADMIN — DEXAMETHASONE 4 MG: 4 TABLET ORAL at 08:08

## 2022-08-30 RX ADMIN — GABAPENTIN 600 MG: 300 CAPSULE ORAL at 05:08

## 2022-08-30 RX ADMIN — AMLODIPINE BESYLATE 10 MG: 5 TABLET ORAL at 08:08

## 2022-08-30 RX ADMIN — TRIAMTERENE AND HYDROCHLOROTHIAZIDE 1 TABLET: 37.5; 25 TABLET ORAL at 08:08

## 2022-08-30 NOTE — PLAN OF CARE
Discharging today as planned.    Alerted Priscilla at [facility] location ([phone]), who indicated that they have reached out to schedule pt for outpatient PT.    Alerted Laura with [facility] via [facility] and reminder her to have RW and BSC delivered to room this am by 0945.      Family training is scheduled for this am prior to discharge with  Petr.

## 2022-08-30 NOTE — PT/OT/SLP DISCHARGE
Occupational Therapy Discharge Summary    Marina Dixon  MRN: [id number]   Principal Problem: Lumbar radiculopathy      Patient Discharged from acute Occupational Therapy on 8/30/2022.  Please refer to prior OT note dated 8/29/2022 for functional status.    Assessment:      Patient has met all goals and is not appropriate for therapy.    Objective:     GOALS:   Multidisciplinary Problems       Occupational Therapy Goals       Not on file              Multidisciplinary Problems (Resolved)          Problem: Occupational Therapy    Goal Priority Disciplines Outcome Interventions   Occupational Therapy Goal   (Resolved)     OT, PT/OT Met    Description: ADLs:  Pt to perform grooming tasks with independently and standing at sink. MET  Pt to perform UB dressing with set up. MET  Pt to perform LB dressing with set up with AE as needed MET  Pt to perform putting on/off footwear task with set up and AE as needed.MET  Pt to perform toileting with set up using toilet aide as needed.MET    Functional Transfers:  Pt to perform toilet transfers with set up and RW MET   Pt to perform a walk-in shower transfer with set up and RW MET    IADLs:  Pt to perform simple meal prep with supervision.MET    Balance, Strengthening, Endurance, Balance:  Pt to consistently demonstrate adherence to orthopedic precautions during all ADL's as instructed by OT. MET  Pt to demonstrate good dynamic standing balance as required to perform ADL's from standing level. MET  Pt to demonstrate good BUE strength during functional task MET  Pt to demonstrate consistent adherence to breathing control and energy conservation techniques as educated by OT. MET                       Care Scores:   Admission Assessment Current Status Discharge  Goal   Functional Area: Care Score:  Care Score:    Eating 6 6 Independent   Oral Hygiene 6 6 Independent   Toileting Hygiene 5 6 Independent   Shower/Bathe Self 4 6 Independent   Upper Body Dressing 4 6 Independent   Lower  Body Dressing 4 6 Independent   Putting On/Taking Off Footwear 4 6 Independent   Toilet Transfer 4 6 Independent     Reasons for Discontinuation of Therapy Services   Satisfactory goal achievement.      Plan:     Patient Discharged to: Home no OT services needed      8/30/2022

## 2022-08-30 NOTE — PT/OT/SLP PROGRESS
"Occupational Therapy Inpatient Rehab Treatment    Name: Marina Dixon  MRN: [id number]    Assessment:  Marina Dixon is a 62 y.o. female admitted with a medical diagnosis of Lumbar radiculopathy.  She presents with the following impairments/functional limitations:  decreased upper extremity function.    General Precautions: Standard, fall     Orthopedic Precautions:spinal precautions     Braces: LSO    Rehab Prognosis: Good; patient would benefit from acute skilled OT services to address these deficits and reach maximum level of function.      History:     Past Medical History:   Diagnosis Date    Arthritis     Endometriosis of uterus 2002    GERD (gastroesophageal reflux disease)     Hypertension 2020    Menopause 2002    Hysterectomy    NINA (obstructive sleep apnea)     wears cpap    Sciatica        Past Surgical History:   Procedure Laterality Date    BREAST BIOPSY Right 6/27/2022    Procedure: BIOPSY, BREAST, WITH LUMPECTOMY / Right Major duct excision;  Surgeon: Sintia Red MD;  Location: [facility];  Service: General;  Laterality: Right;    CATARACT EXTRACTION W/ INTRAOCULAR LENS  IMPLANT, BILATERAL      HYSTERECTOMY  2002    LUMBAR LAMINECTOMY N/A 8/18/2022    Procedure: LAMINECTOMY, SPINE, LUMBAR;  Surgeon: Osvaldo Aquino MD;  Location: [facility];  Service: Neurosurgery;  Laterality: N/A;  open L2/3, L3/4 laminectomy  drainage of fluid    SKIN BIOPSY Right     breast       Subjective     Orientation: Oriented x4    Chief Complaint: no new complaints    Patient/Family Comments/goals: "to go home with "    Respiratory Status: Room air    Patients cultural, spiritual, Jewish conflicts given the current situation: no       Objective:     Patient found up in chair with peripheral IV  upon OT entry to room.    Limiting Factors for ADLs:  none      Additional Treatments: Pt and  present for family training and reviewed back precaution and donning/doffing LSO. Reviewed pt's ADL status and made " recommendations with AE at home - i.e. grab bars and HHS also explained how to adjust BSC and walker accessories. Answered all question and concerns and pt/ satisfied with treatment.    Patient left up in chair with call button in reach.     Education provided: Roles and goals of OT, ADLs, assistive device, safety precautions, fall prevention, home safety, and back safety.     Multidisciplinary Problems (Resolved)          Problem: Occupational Therapy    Goal Priority Disciplines Outcome Interventions   Occupational Therapy Goal   (Resolved)     OT, PT/OT Met    Description: ADLs:  Pt to perform grooming tasks with independently and standing at sink. MET  Pt to perform UB dressing with set up. MET  Pt to perform LB dressing with set up with AE as needed MET  Pt to perform putting on/off footwear task with set up and AE as needed.MET  Pt to perform toileting with set up using toilet aide as needed.MET    Functional Transfers:  Pt to perform toilet transfers with set up and RW MET   Pt to perform a walk-in shower transfer with set up and RW MET    IADLs:  Pt to perform simple meal prep with supervision.MET    Balance, Strengthening, Endurance, Balance:  Pt to consistently demonstrate adherence to orthopedic precautions during all ADL's as instructed by OT. MET  Pt to demonstrate good dynamic standing balance as required to perform ADL's from standing level. MET  Pt to demonstrate good BUE strength during functional task MET  Pt to demonstrate consistent adherence to breathing control and energy conservation techniques as educated by OT. MET                       Time Tracking     OT Received On: 08/30/22  Time In 1030     Time Out 1045  Total Time 15 min  Therapy Time: OT Individual: 15  Missed Time:    Missed Time Reason:      Billable Minutes: Self Care/Home Management 15    08/30/2022

## 2022-08-30 NOTE — PT/OT/SLP DISCHARGE
Physical Therapy Discharge Summary    Name: Marina Dixon  MRN: [id number]   Principal Problem: Lumbar radiculopathy          Assessment:     Pt has met all appropriate goals. Pt made good progress.    Objective:     GOALS:   Multidisciplinary Problems       Physical Therapy Goals          Problem: Physical Therapy    Goal Priority Disciplines Outcome Goal Variances Interventions   Physical Therapy Goal     PT, PT/OT Ongoing, Progressing     Description: Bed Mobility: (MET)  Roll left and right independently.   Sit to supine transfer independently.  Supine to sit transfer independently.    Transfers: (MET)  Sit to stand transfer with setup/clean-up assist using RW and LRAD.  Bed to chair transfer with setup/clean-up assist using RW and LRAD.  Car transfer with setup/clean-up assist using RW and LRAD.    Mobility:  Ambulate 200 feet with setup/clean-up assist using RW and LRAD. (MET)  Ambulate 10 feet on uneven surfaces/ramps with setup/clean-up assist using RW and LRAD. (MET)  Ascend/descend a 4 inch curb with setup/clean-up assist using RW and LRAD. (MET)  Ascend/descend 12 stairs with setup/clean-up assist using bilateral handrails. ( Pt has 2 steps with out rail at home) (MET)  Ambulate 1000 feet independently using RW and LRAD. (NOT MET)   Ambulate 150 feet on uneven surfaces/ramps independently using RW and LRAD. (NOT MET)                           Care Scores:   Admission Assessment Current   Status  Discharge   Goal   Functional Area: Care Score:  Care Score:    Roll Left and Right 4 6 Independent   Sit to Lying 4 6 Independent   Lying to Sitting on Side of Bed 4 6 Independent   Sit to Stand 4 6 Independent   Chair/Bed-to-Chair Transfer 4 6 Independent   Car Transfer 4 6 Independent   Walk 10 Feet 4 6 Independent   Walk 50 Feet with Two Turns 4 6 Independent   Walk 150 Feet 4 6 Independent   Walk 10 Feet Uneven Surface 4 6 Set-up/clean-up   1 Step (Curb) 4 6 Independent   4 Steps 4 6 Independent   12 Steps 4  6 Independent   Picking Up Object 4 6 Independent   Wheel 50 Feet with Two Turns 9 9 Not applicable   Wheel 150 Feet 9 9 Not applicable       Reasons for Discontinuation of Therapy Services  Satisfactory goal achievement.      Plan:     Patient Discharged to: Home with Home Health Service and Outpatient Therapy Services.    8/30/2022

## 2022-08-30 NOTE — PT/OT/SLP PROGRESS
Physical Therapy Inpatient Rehab Treatment    Patient Name:  Marina Dixon   MRN:  [id number]    Recommendations:     Discharge Recommendations:  other (see comments) (pending progress)   Discharge Equipment Recommendations:     Barriers to discharge: None    Assessment:     Marina Dixon is a 62 y.o. female admitted with a medical diagnosis of Lumbar radiculopathy.  She presents with the following impairments/functional limitations:  weakness, impaired endurance, impaired functional mobility, gait instability, impaired balance, decreased safety awareness, pain .    Rehab Diagnosis:     Recent Surgery: * No surgery found *      General Precautions: Standard, fall     Orthopedic Precautions:spinal precautions     Braces: LSO    Rehab Prognosis: Good; patient would benefit from acute skilled PT services to address these deficits and reach maximum level of function.      History:     Past Medical History:   Diagnosis Date    Arthritis     Endometriosis of uterus 2002    GERD (gastroesophageal reflux disease)     Hypertension 2020    Menopause 2002    Hysterectomy    NINA (obstructive sleep apnea)     wears cpap    Sciatica        Past Surgical History:   Procedure Laterality Date    BREAST BIOPSY Right 6/27/2022    Procedure: BIOPSY, BREAST, WITH LUMPECTOMY / Right Major duct excision;  Surgeon: Sintia Red MD;  Location: [facility];  Service: General;  Laterality: Right;    CATARACT EXTRACTION W/ INTRAOCULAR LENS  IMPLANT, BILATERAL      HYSTERECTOMY  2002    LUMBAR LAMINECTOMY N/A 8/18/2022    Procedure: LAMINECTOMY, SPINE, LUMBAR;  Surgeon: Osvaldo Aquino MD;  Location: [facility];  Service: Neurosurgery;  Laterality: N/A;  open L2/3, L3/4 laminectomy  drainage of fluid    SKIN BIOPSY Right     breast       Subjective     Chief Complaint: N/A  Patient/Family Comments/goals: Shes 200% better - pt          Respiratory Status: Room air    Patients cultural, spiritual, Zoroastrian conflicts given the current  situation: no      Objective:     Communicated with RN prior to session.  Patient found up in chair with peripheral IV  upon PT entry to room.    Pt is Oriented x3 and Alert, Cooperative, and Motivated.    Functional Mobility:        Current   Status   Discharge   Goal   Functional Area: Care Score:     Roll Left and Right    Independent   Sit to Lying    Independent   Lying to Sitting on Side of Bed    Independent   Sit to Stand 6  Independent   Chair/Bed-to-Chair Transfer 6  Independent   Car Transfer    Independent   Walk 10 Feet 6  Independent   Walk 50 Feet with Two Turns 6  Independent   Walk 150 Feet 6 ~400' with RW step through gait pattern , LSO donned throughout. No LOB noted  Independent   Walk 10 Feet Uneven Surface    Set-up/clean-up   1 Step (Curb)    Independent   4 Steps    Independent   12 Steps    Independent   Picking Up Object    Independent   Wheel 50 Feet with Two Turns    Not applicable   Wheel 150 Feet    Not applicable       Therapeutic Activities and Exercises:    Family training completed. All questions and concerns addressed. HEP provided. Pt and  educated on safe d/c home. PT and  with good understanding of safe D/C home.     Activity Tolerance good     Patient left up in chair with all lines intact, call button in reach, and  and OT present.    Education provided: roles and goals of PT/PTA, transfer training, bed mob, gait training, stair training, safety awareness, assistive device, strengthening exercises, and spinal precautions    Expected compliance:    GOALS:   Multidisciplinary Problems       Physical Therapy Goals          Problem: Physical Therapy    Goal Priority Disciplines Outcome Goal Variances Interventions   Physical Therapy Goal     PT, PT/OT Ongoing, Progressing     Description: Bed Mobility: (MET)  Roll left and right independently.   Sit to supine transfer independently.  Supine to sit transfer independently.    Transfers: (MET)  Sit to stand  transfer with setup/clean-up assist using RW and LRAD.  Bed to chair transfer with setup/clean-up assist using RW and LRAD.  Car transfer with setup/clean-up assist using RW and LRAD.    Mobility:  Ambulate 200 feet with setup/clean-up assist using RW and LRAD. (MET)  Ambulate 10 feet on uneven surfaces/ramps with setup/clean-up assist using RW and LRAD. (MET)  Ascend/descend a 4 inch curb with setup/clean-up assist using RW and LRAD. (MET)  Ascend/descend 12 stairs with setup/clean-up assist using bilateral handrails. ( Pt has 2 steps with out rail at home) (MET)  Ambulate 1000 feet independently using RW and LRAD.  Ambulate 150 feet on uneven surfaces/ramps independently using RW and LRAD.                           Plan:     During this hospitalization, patient to be seen 5 x/week to address the identified rehab impairments via gait training, therapeutic activities, therapeutic exercises and progress toward the following goals:    Plan of Care Expires:  08/29/22    Additional Information:         Time Tracking:     PT Received On: 08/30/22  Time In 1010     Time Out 1030  Total Time 20 min  Therapy Time PT Individual: 20  Missed Time:    Time Missed due to:      Billable Minutes: Therapeutic Activity 20 08/30/2022

## 2022-08-30 NOTE — PLAN OF CARE
Problem: Rehabilitation (IRF) Plan of Care  Goal: Plan of Care Review  Outcome: Ongoing, Progressing  Goal: Patient-Specific Goal (Individualized)  Outcome: Ongoing, Progressing  Goal: Absence of New-Onset Illness or Injury  Outcome: Ongoing, Progressing  Goal: Optimal Comfort and Wellbeing  Outcome: Ongoing, Progressing     Problem: Fall Injury Risk  Goal: Absence of Fall and Fall-Related Injury  Outcome: Ongoing, Progressing     Problem: Pain Acute  Goal: Acceptable Pain Control and Functional Ability  Outcome: Ongoing, Progressing

## 2022-08-30 NOTE — DISCHARGE SUMMARY
[facility] ([facility])  Rehab Discharge Summary    Patient Name: Marina Dixon  MRN: [id number]  Age: 62 y.o. Sex: female  : 1960  Hospital Length of Stay: 8 days   Date of Service: 2022      Discharge Information   Date of Admission: 2022  Date of Discharge: 2022  Admit Diagnosis: Lumbar radiculopathy          Vitamin-D deficiency          GERD         Constipation         HTN         NINA         Normocytic anemia  Discharge Diagnosis: Lumbar radiculopathy (improved)           Vitamin-D deficiency (stable)           GERD (stable)           Constipation (stable)           HTN (stable)           NINA (stable)           Normocytic anemia (stable)  COVID-19 testing:  Negative on 2022  COVID-19 vaccination status:  Vaccinated (Pfizer):  2021, 2021, and 2021    Internal Medicine (attending): Roderick Serna MD  Physiatry (consulting):  Arsh Wood MD     OUTPATIENT PROVIDERS  PCP: Kalli Sweeney MD  Neuro surgery: Osvaldo Aquino MD  Cardiology: Karl Jonas MD    Hospital Course   62-year-old white female presented to [facility] ED on 2022 complaining of buttocks spasms, back pain, and leg pain that worsened on  s/p right L2-3 and L3-4 MIS hemilaminectomy and diskectomy on 8/15.  Denied bowel/bladder dysfunction.  PMH significant for NINA, lumbar stenosis, and HTN.  Imaging significant for fluid collection with pressure of the thecal sac.  Tolerated revision of right L2-3, L3-4 MIS with removal of hematoma on  without perioperative complications.  Robaxin initiated without relief of muscle spasms.  Valium and Decadron initiated on  with improvement pain/spasms.  Able to mobilize on  with adjustment of medications.  Tolerated transfer to [facility] inpatient rehab unit on  without incident.  During inpatient rehab course Movantik 25 mg daily initiated.  Blood pressure remains slightly elevated.  Norvasc 2.5 mg  "daily initiated on 08/24.  Norvasc increased to 5 mg daily on 08/25.  Complained of dysuria on 08/25.  UA obtained and Cipro 500 mg b.i.d. initiated.  Normal saline 75 mL/HR x1 L also initiated.  Norvasc increased to 10 mg daily on 08/26.  BP likely elevated due to Decadron.  Plan to adjust hypertensive medications as Decadron is tapered and BP improves.  Metoprolol 500 mg t.i.d. change to p.r.n. on 08/28 per patient request.  Reactive leukocytosis appreciated on 08/29 due to steroids.  Vital signs overall remained at goal with moderately elevated BP.  Continue to participate progress in therapy.  Overall independent with PT and OT.  Working on high ADLs.  Ambulating greater than 600 ft. Med reconciliation completed.  Discharge orders initiated.   Stable for transfer home with outpatient therapy.  To follow-up with scheduled appointments documented below.    Chief Complaint: Lumbar radiculopathy s/p revision of right L2-3, L3-4 MIS with removal of hematoma on 8/18/2022    BP (!) 144/73   Pulse 69   Temp 98 °F (36.7 °C) (Oral)   Resp 20   Ht 5' 6" (1.676 m)   Wt 107.7 kg (237 lb 7 oz)   SpO2 99%   BMI 38.32 kg/m²      Physical Exam  Constitutional:       Appearance: Normal appearance.   HENT:      Head: Normocephalic.      Mouth/Throat:      Mouth: Mucous membranes are moist.   Eyes:      Pupils: Pupils are equal, round, and reactive to light.   Cardiovascular:      Rate and Rhythm: Normal rate and regular rhythm.      Heart sounds: Normal heart sounds.      Comments: BL LE trace edema.  Pulmonary:      Effort: Pulmonary effort is normal.      Breath sounds: Normal breath sounds.   Abdominal:      General: Bowel sounds are normal.      Palpations: Abdomen is soft.   Musculoskeletal:      Cervical back: Neck supple.      Comments: Generalized weakness and muscle atrophy. Lumbar incision healing   Skin:     General: Skin is warm and dry.   Neurological:      General: No focal deficit present.      Mental Status: " She is alert and oriented to person, place, and time.   Psychiatric:         Mood and Affect: Mood normal.         Behavior: Behavior normal.         Thought Content: Thought content normal.         Judgment: Judgment normal.   *MD performed and documented physical examination        Labs  Admission on 08/22/2022   Component Date Value Ref Range Status    Sodium Level 08/23/2022 141  136 - 145 mmol/L Final    Potassium Level 08/23/2022 4.2  3.5 - 5.1 mmol/L Final    Chloride 08/23/2022 102  98 - 107 mmol/L Final    Carbon Dioxide 08/23/2022 31  23 - 31 mmol/L Final    Glucose Level 08/23/2022 136 (H)  82 - 115 mg/dL Final    Blood Urea Nitrogen 08/23/2022 27.3 (H)  9.8 - 20.1 mg/dL Final    Creatinine 08/23/2022 0.97  0.55 - 1.02 mg/dL Final    Calcium Level Total 08/23/2022 9.2  8.4 - 10.2 mg/dL Final    Protein Total 08/23/2022 6.5  5.8 - 7.6 gm/dL Final    Albumin Level 08/23/2022 3.1 (L)  3.4 - 4.8 gm/dL Final    Globulin 08/23/2022 3.4  2.4 - 3.5 gm/dL Final    Albumin/Globulin Ratio 08/23/2022 0.9 (L)  1.1 - 2.0 ratio Final    Bilirubin Total 08/23/2022 0.3  <=1.5 mg/dL Final    Alkaline Phosphatase 08/23/2022 78  40 - 150 unit/L Final    Alanine Aminotransferase 08/23/2022 25  0 - 55 unit/L Final    Aspartate Aminotransferase 08/23/2022 18  5 - 34 unit/L Final    eGFR 08/23/2022 >60  mls/min/1.73/m2 Final    Magnesium Level 08/23/2022 2.40  1.60 - 2.60 mg/dL Final    Phosphorus Level 08/23/2022 4.1  2.3 - 4.7 mg/dL Final    Prealbumin 08/23/2022 23.5  14.0 - 37.0 mg/dL Final    Ferritin Level 08/23/2022 103.06  4.63 - 204.00 ng/mL Final    Iron Binding Capacity Unsaturated 08/23/2022 177  70 - 310 ug/dL Final    Iron Level 08/23/2022 66  50 - 170 ug/dL Final    Transferrin 08/23/2022 205  173 - 360 mg/dL Final    Iron Binding Capacity Total 08/23/2022 243 (L)  250 - 450 ug/dL Final    Iron Saturation 08/23/2022 27  20 - 50 % Final    WBC 08/23/2022 11.6 (H)  4.5 - 11.5 x10(3)/mcL Final    RBC 08/23/2022 3.79  (L)  4.20 - 5.40 x10(6)/mcL Final    Hgb 08/23/2022 11.0 (L)  12.0 - 16.0 gm/dL Final    Hct 08/23/2022 34.0 (L)  37.0 - 47.0 % Final    MCV 08/23/2022 89.7  80.0 - 94.0 fL Final    MCH 08/23/2022 29.0  27.0 - 31.0 pg Final    MCHC 08/23/2022 32.4 (L)  33.0 - 36.0 mg/dL Final    RDW 08/23/2022 13.4  11.5 - 17.0 % Final    Platelet 08/23/2022 342  130 - 400 x10(3)/mcL Final    MPV 08/23/2022 9.5  7.4 - 10.4 fL Final    Neut % 08/23/2022 71.0  % Final    Lymph % 08/23/2022 18.5  % Final    Mono % 08/23/2022 6.1  % Final    Eos % 08/23/2022 0.0  % Final    Basophil % 08/23/2022 0.2  % Final    Lymph # 08/23/2022 2.15  0.6 - 4.6 x10(3)/mcL Final    Neut # 08/23/2022 8.3  2.1 - 9.2 x10(3)/mcL Final    Mono # 08/23/2022 0.71  0.1 - 1.3 x10(3)/mcL Final    Eos # 08/23/2022 0.00  0 - 0.9 x10(3)/mcL Final    Baso # 08/23/2022 0.02  0 - 0.2 x10(3)/mcL Final    IG# 08/23/2022 0.49 (H)  0 - 0.04 x10(3)/mcL Final    IG% 08/23/2022 4.2  % Final    NRBC% 08/23/2022 0.0  % Final    Color, UA 08/25/2022 Yellow  Yellow, Colorless, Other, Clear Final    Appearance, UA 08/25/2022 Clear  Clear Final    Specific Gravity, UA 08/25/2022 1.010   Final    pH, UA 08/25/2022 7.5  5.0, 5.5, 6.0, 6.5, 7.0, 7.5, 8.0, 8.5 Final    Protein, UA 08/25/2022 Negative  Negative, 300  mg/dL Final    Glucose, UA 08/25/2022 Negative  Negative, Normal mg/dL Final    Ketones, UA 08/25/2022 Negative  Negative, +1, +2, +3, +4, +5, >=160, >=80 mg/dL Final    Blood, UA 08/25/2022 Negative  Negative unit/L Final    Bilirubin, UA 08/25/2022 Negative  Negative mg/dL Final    Urobilinogen, UA 08/25/2022 0.2  0.2, 1.0, Normal mg/dL Final    Nitrites, UA 08/25/2022 Negative  Negative Final    Leukocyte Esterase, UA 08/25/2022 Small (A)  Negative, 75  unit/L Final    Bacteria, UA 08/25/2022 Moderate (A)  None Seen, Rare, Occasional /HPF Final    RBC, UA 08/25/2022 None Seen  None Seen, 0-2, 3-5, 0-5 /HPF Final    WBC, UA 08/25/2022 0-2  None Seen, 0-2, 3-5, 0-5 /HPF  Final    Squamous Epithelial Cells, UA 08/25/2022 Rare  None Seen, Rare, Occasional, Occ /HPF Final    Prealbumin 08/29/2022 40.6 (H)  14.0 - 37.0 mg/dL Final    Sodium Level 08/29/2022 137  136 - 145 mmol/L Final    Potassium Level 08/29/2022 4.9  3.5 - 5.1 mmol/L Final    Chloride 08/29/2022 98  98 - 107 mmol/L Final    Carbon Dioxide 08/29/2022 30  23 - 31 mmol/L Final    Glucose Level 08/29/2022 131 (H)  82 - 115 mg/dL Final    Blood Urea Nitrogen 08/29/2022 30.9 (H)  9.8 - 20.1 mg/dL Final    Creatinine 08/29/2022 1.10 (H)  0.55 - 1.02 mg/dL Final    Calcium Level Total 08/29/2022 9.2  8.4 - 10.2 mg/dL Final    Protein Total 08/29/2022 6.8  5.8 - 7.6 gm/dL Final    Albumin Level 08/29/2022 3.5  3.4 - 4.8 gm/dL Final    Globulin 08/29/2022 3.3  2.4 - 3.5 gm/dL Final    Albumin/Globulin Ratio 08/29/2022 1.1  1.1 - 2.0 ratio Final    Bilirubin Total 08/29/2022 0.4  <=1.5 mg/dL Final    Alkaline Phosphatase 08/29/2022 82  40 - 150 unit/L Final    Alanine Aminotransferase 08/29/2022 60 (H)  0 - 55 unit/L Final    Aspartate Aminotransferase 08/29/2022 27  5 - 34 unit/L Final    eGFR 08/29/2022 56  mls/min/1.73/m2 Final    Magnesium Level 08/29/2022 2.60  1.60 - 2.60 mg/dL Final    Phosphorus Level 08/29/2022 4.8 (H)  2.3 - 4.7 mg/dL Final    WBC 08/29/2022 19.8 (H)  4.5 - 11.5 x10(3)/mcL Final    RBC 08/29/2022 4.35  4.20 - 5.40 x10(6)/mcL Final    Hgb 08/29/2022 12.9  12.0 - 16.0 gm/dL Final    Hct 08/29/2022 39.1  37.0 - 47.0 % Final    MCV 08/29/2022 89.9  80.0 - 94.0 fL Final    MCH 08/29/2022 29.7  27.0 - 31.0 pg Final    MCHC 08/29/2022 33.0  33.0 - 36.0 mg/dL Final    RDW 08/29/2022 13.3  11.5 - 17.0 % Final    Platelet 08/29/2022 429 (H)  130 - 400 x10(3)/mcL Final    MPV 08/29/2022 9.5  7.4 - 10.4 fL Final    Neut Man 08/29/2022 80  47 - 80 % Final    Lymph Man 08/29/2022 14  13 - 40 % Final    Monocyte Man 08/29/2022 4  2 - 11 % Final    [place] Man 08/29/2022 2  % Final    Abs Neut calc 08/29/2022 16.236 (H)   2.1 - 9.2 x10(3)/mcL Final    Abs Mono 08/29/2022 0.792  0.1 - 1.3 x10(3)/mcL Final    Abs Lymp 08/29/2022 2.772  0.6 - 4.6 x10(3)/mcL Final    RBC Morph 08/29/2022 Normal  Normal Final    Platelet Est 08/29/2022 Increased (A)  Normal, Adequate Final     Radiology  MRI lumbar spine with and without contrast on 8/17/2022, IMPRESSION: Severe canal stenosis at L2-L3 and L3-L4, moderate at L4-L5, with dorsal epidural fluid collection. Multilevel neural foraminal stenoses as described. Postoperative fluid in the right sided laminectomy beds and subcutaneous soft tissues.    Discharge Summary Plan   Discharge Status: Improved    Location: Discharge home    Medications: See discharge medicine reconciliation    Activity: as tolerated    Diet: Regular    Instructions:  Take all medications as prescribed.      Attend appointments as scheduled.      Return to ED if symptoms worsen, or if t > 100.4.    Education:  Lumbar radiculopathy.  HTN.  Follow-up:  Kalli Blanchard MD within 1 week   Osvaldo Aquino MD on 09/01/2021 at 9:00 a.m.  Karl Jonas MD within 2-4 weeks    Discussed plan of care, and patient communicated understanding. Agreed to comply with recommendations.    Ravin Kang NP conducted independent examination and assisted with medical documentation.     Discharge Time: 48 minutes

## 2022-08-30 NOTE — PROGRESS NOTES
Inpatient Nutrition Assessment    Admit Date: 8/22/2022   Length of Stay: 8 days    Nutrition Recommendation/Prescription     1. Continue current diet as tolerated     2. Monitor need for ONS     3. Encouraged H2O intake     Nutrition Assessment     Malnutrition Assessment/Nutrition-Focused Physical Exam    Does not meet criteria     Chart Review    Reason Seen: Rehab consult    Diagnosis:  Lumbar radiculopathy s/p revision of right L2-3, L3-4 MIS with removal of hematoma on 8/18/2022    Relevant Medical History: Lumbar radiculopathy s/p laminectomy on 08/15/2022 with revision laminectomy and removal of hematoma on 08/18/2022, vitamin-D deficiency, GERD, HTN, NINA, normocytic anemia    Nutrition-Related Medications: pepcid, miralax, Vit D, diuretic   Calorie Containing IV Medications: no significant kcals from medications at this time    Nutrition-Related Labs: 8/30: BUN 30.9(H), Crea 1.10(H), Gluc 131(H), Phos 4.8(H), PAB 40.6(H), ALT 60(H) 8/23: PAB 23.5 WNL, Glu 136(H), BUN 27.3(H), Alb 3.1(L), GFR >60     Diet/PN Order: Diet Adult Regular  Diet Adult Regular  Oral Supplement Order: none at this time  Tube Feeding Order: none at this time  Appetite/Oral Intake: good/% of meals  Factors Affecting Nutritional Intake: none identified at this time  Food/Orthodoxy/Cultural Preferences:  No coffee, No milk, No chocolate     Skin Integrity: incision  Wound(s):   None documented at this time     Comments    8/30: Continues to reports good appetite. Per EMR, is averaging ~100% po intake at meals x last 3 days. Does report some constipation. Encouraged H2O intake. Pt tells me has not been drinking much water. Is scheduled for D/C today.      8/23: Pt reports good appetite - enjoys the food. Per EMR is averaging ~95% po intake x last 5 meals documented. Reports chronic constipation at home, but had loose stools yesterday and BMs still soft today - refused miralax. Denies any wt loss.     Anthropometrics    Height: 5'  "6" (167.6 cm) Height Method: Stated  Last Weight: 107.7 kg (237 lb 7 oz) (08/27/22 0500) Weight Method: Standard Scale  BMI (Calculated): 38.3  BMI Classification: obese grade III (BMI >/=40)  Ideal Body Weight (IBW), Female: 130 lb  % Ideal Body Weight, Female (lb): 194.85 %  Usual Weight Provided By: EMR weight history 111.1 kg (6/27/22)     8/30: 107.7 kg (wts continue to fluctuate)   Wt Readings from Last 5 Encounters:   08/22/22 114.9 kg (253 lb 4.9 oz)   08/20/22 106.6 kg (235 lb) - Possible  inaccuracy in measurement?   07/07/22 113.5 kg (250 lb 3.2 oz)   06/27/22 111.1 kg (244 lb 14.9 oz)   06/14/22 113.6 kg (250 lb 6.4 oz)     Weight Change(s) Since Admission:  Admit Weight: 114.9 kg (253 lb 4.9 oz) (08/22/22 1700)    Estimated Needs    Weight Used For Calorie Calculations: 114.9 kg (253 lb 4.9 oz)  Energy Calorie Requirements (kcal): 1558  Energy Need Method: [place] (MSJ x (1.2) SF (- 500 kcal for wt loss))  Weight Used For Protein Calculations: 59.1 kg (130 lb 4.3 oz) (IBW)  Protein Requirements: 130 g (2.2 g/kg/IBW)   Temp: 98 °F (36.7 °C)       Enteral Nutrition    Patient not receiving enteral nutrition at this time.    Parenteral Nutrition    Patient not receiving parenteral nutrition support at this time.    Evaluation of Received Nutrient Intake    Calories: meeting estimated needs  Protein: meeting estimated needs    Patient Education    Not applicable.    Nutrition Diagnosis     PES: No nutrition diagnosis at this time.     Interventions/Goals     Intervention(s): general/healthful diet  Goal: Meet greater than 75% of nutritional needs throughout course of stay. (goal met)    Monitoring & Evaluation     Dietitian will monitor food and beverage intake, energy intake, weight, weight change and electrolyte and renal panel.  Nutrition Risk/Follow-Up: low (follow-up in 5-7 days)   "

## 2022-09-21 ENCOUNTER — HISTORICAL (OUTPATIENT)
Dept: ADMINISTRATIVE | Facility: HOSPITAL | Age: 62
End: 2022-09-21
Payer: COMMERCIAL

## 2022-12-19 DIAGNOSIS — Z78.0 POST-MENOPAUSAL: Primary | ICD-10-CM

## 2023-01-09 ENCOUNTER — HOSPITAL ENCOUNTER (OUTPATIENT)
Dept: RADIOLOGY | Facility: HOSPITAL | Age: 63
Discharge: HOME | End: 2023-01-09
Attending: FAMILY MEDICINE
Payer: COMMERCIAL

## 2023-01-09 ENCOUNTER — HOSPITAL ENCOUNTER (OUTPATIENT)
Dept: RADIOLOGY | Facility: HOSPITAL | Age: 63
Discharge: HOME | End: 2023-01-09
Attending: SURGERY
Payer: COMMERCIAL

## 2023-01-09 DIAGNOSIS — Z78.0 POST-MENOPAUSAL: ICD-10-CM

## 2023-01-09 DIAGNOSIS — Z12.31 BREAST CANCER SCREENING BY MAMMOGRAM: ICD-10-CM

## 2023-01-09 PROCEDURE — 77080 DEXA BONE DENSITY SPINE HIP: ICD-10-PCS | Mod: 26,,, | Performed by: RADIOLOGY

## 2023-01-09 PROCEDURE — 77081 DXA BONE DENSITY APPENDICULR: CPT | Mod: 26,59,, | Performed by: RADIOLOGY

## 2023-01-09 PROCEDURE — 77080 DXA BONE DENSITY AXIAL: CPT | Mod: TC

## 2023-01-09 PROCEDURE — 77081 DEXA BONE DENSITY APPENDICULAR SKELETON: ICD-10-PCS | Mod: 26,59,, | Performed by: RADIOLOGY

## 2023-01-09 PROCEDURE — 77063 MAMMO DIGITAL SCREENING BILAT WITH TOMO: ICD-10-PCS | Mod: 26,,, | Performed by: STUDENT IN AN ORGANIZED HEALTH CARE EDUCATION/TRAINING PROGRAM

## 2023-01-09 PROCEDURE — 77080 DXA BONE DENSITY AXIAL: CPT | Mod: 26,,, | Performed by: RADIOLOGY

## 2023-01-09 PROCEDURE — 77067 SCR MAMMO BI INCL CAD: CPT | Mod: TC

## 2023-01-09 PROCEDURE — 77067 MAMMO DIGITAL SCREENING BILAT WITH TOMO: ICD-10-PCS | Mod: 26,,, | Performed by: STUDENT IN AN ORGANIZED HEALTH CARE EDUCATION/TRAINING PROGRAM

## 2023-01-09 PROCEDURE — 77081 DXA BONE DENSITY APPENDICULR: CPT | Mod: 59,TC

## 2023-01-09 PROCEDURE — 77063 BREAST TOMOSYNTHESIS BI: CPT | Mod: 26,,, | Performed by: STUDENT IN AN ORGANIZED HEALTH CARE EDUCATION/TRAINING PROGRAM

## 2023-01-09 PROCEDURE — 77067 SCR MAMMO BI INCL CAD: CPT | Mod: 26,,, | Performed by: STUDENT IN AN ORGANIZED HEALTH CARE EDUCATION/TRAINING PROGRAM

## 2023-01-20 NOTE — PROGRESS NOTES
"[facility]  Breast Surgical Oncology  Post-Op Patient Office Visit       PCP: MD Dr. Tom Douglas, GYN MD     Chief Complaint:   Chief Complaint   Patient presents with    Follow-up     Patient's here for a 6 Month Followup, last Bilateral Screening Mammogram and Bone Density testing done on 1/3/23, pateint states that she's doing good no breast complaints, she would like for her imaging/dexa to be sent to her PCP        Subjective:   Treatment History:  6/27/2022 Right breast Major duct excision for right nipple discharge- benign    Interval History:  1/26/2023 - Marina Dixon is doing well in the interval. She had a SCR MG in January that resulted as benign. She currently denies any breast issues including rashes, redness, pain, swelling, nipple discharge, or new lumps/masses. She denies any issues with right breast. Denies any discharge from that right nipple. She is happy with her results post excision. Incision has healed from excision.     HPI:  Marina Dixon initially presented on 8/18/2020 with concern for right nipple discharge. She states she first noticed clear right nipple discharge about 1-2 months prior. It is not spontaneous and requires manipulation. She expressed the discharge from the nipple and it became bloody (or what she thought was blood). She states she was seen and evaluated by Dr. Santso (at this time not able to express any discharge) who recommended an ultrasound. She has DG MG and US of the right breast at [facility] on 7/27/20 and they were both negative for any findings. She had not had discharge since the first occurrence until a few days after her mammogram at [facility]. She has discharge after she "squeezed the nipple".     6/14/2022 - Marina Dixon presents today for follow up for right nipple discharge. She still has some drainage from the right nipple. It usually is clear/yellow, but states the last time " she checked it was bloody.    Her [facility] Lifetime Risk for breast cancer was calculated and found to be 13.5%.       Imagin. 2020 BL SC MF at [facility] - which revealed scattered fibroglandular densities, benign calcifications and no suspicious masses, calcifications, or other findings. BIRADS-2: benign.    2. 2020 R DG MG/ US at [facility] - which revealed heterogenously dense breast tissue. No suspicious masses, calcifications, or densities are seen including the subareolar region. Right US did not reveal any suspicious cystic or solid masses or ductal structures seen in the subareolar right breast. Lymph nodes are normal in the right axilla. No evidence of breast malignancy. No interval changes in appearance. BIRADS 2- Benign    3. 2021 BL DG MG / R US at [facility] - which revealed no concerning masses, calcifications, or distortions in either breast. R US revealed no evidence of ductal dilatation, ectasia, cyst, mass, or suspicious findings. BI-RADS 1: negative    4. 2022 BL DG MG/R US BREAST LIMITED at [facility] - which revealed on MG there is no correlate for the right nipple discharge. No significant masses, calcifications, or other findings are seen in either breast. There has been no significant interval change. On R US there is a 1.1 x 0.2 x 0.2 cm solid vascular hypoechoic intraductal mass in the subareolar position. The proximal section of the duct in which the solid mass resides is dilated with anechoic fluid leading up to the nipple. This correlates with the patient's right nipple discharge and is suspicious. No suspicious right axillary adenopathy. BIRADS-4 suspicious; biopsy recommended.    5. 2023 SCR MG at [facility] : BENIGN. There is no mammographic evidence of malignancy. There are new, benign post-surgical changes of the right breast subareolar region related to recent prior major duct excision.  No significant masses, calcifications, or other findings are seen in  either breast.  A routine screening mammogram in one year in the absence of significant clinical findings in the interval is recommended (January)        Pathology:  1. 1/31/2022 Ultrasound-guided Core Needle Biopsy of Right Breast Subareolar Intraductal Mass Posterior to Nipple - Non-proliferative fibrocystic changes, mild, non-specific. No evidence of atypia or malignancy     2. 6/27/2022 Right breast Major duct excision - extensive breast stromal fibrosis with multifocal mild-to-moderate duct ectasia and focal mild intraductal hyperplasia of usual type.  No evidence of significant inflammation, ductal rupture, dysplasia or malignancy identified.    OB/GYN History:  Menarche Onset: 10  Menopause: Post, at age: 42  Hormonal birth control (duration): 18 years  Pregnancies: 2  Age at first child birth: 23  Child births: 2  Hysterectomy: yes, at age 42  Oophorectomy: yes, at age 42  HRT: HRT starting at 42 and stopping at 60 (estrogen alone)        Other Relevant History:  # of breast biopsies (when and pathology results): none  MG breast density: Category B (Scattered fibroglandular)  Prior thoracic RT: none  Genetic testing: none  Ashkenazi [employer] descent: No        Family History:  - Father: prostate cancer (64)  - Brother: Prostate (69)  - Brother: Kidney cancer (45)  Family History   Problem Relation Age of Onset    Diabetes Mother     Heart failure Mother     Hypertension Mother     Cancer Father     Rheum arthritis Sister     Cancer Brother         Patient History:  Past Medical History:   Diagnosis Date    Arthritis     Endometriosis of uterus 2002    GERD (gastroesophageal reflux disease)     Hypertension 2020    Menopause 2002    Hysterectomy    NINA (obstructive sleep apnea)     wears cpap    Sciatica        Past Surgical History:   Procedure Laterality Date    BREAST BIOPSY Right 06/27/2022    Procedure: BIOPSY, BREAST, WITH LUMPECTOMY / Right Major duct excision;  Surgeon: Sintia Red MD;  Location:  [facility] OR;  Service: General;  Laterality: Right;    BREAST SURGERY  2022    CATARACT EXTRACTION W/ INTRAOCULAR LENS  IMPLANT, BILATERAL      EYE SURGERY  2017    HYSTERECTOMY  2002    LUMBAR LAMINECTOMY N/A 2022    Procedure: LAMINECTOMY, SPINE, LUMBAR;  Surgeon: Osvaldo Aquino MD;  Location: [facility] OR;  Service: Neurosurgery;  Laterality: N/A;  open L2/3, L3/4 laminectomy  drainage of fluid    SKIN BIOPSY Right     breast    SPINE SURGERY  2022    Slipped disc       Social History     Socioeconomic History    Marital status:    Tobacco Use    Smoking status: Never    Smokeless tobacco: Never   Substance and Sexual Activity    Alcohol use: Never    Drug use: Never    Sexual activity: Yes     Partners: Male     Birth control/protection: None         There is no immunization history on file for this patient.    Medications/Allergies:  Current Outpatient Medications on File Prior to Visit   Medication Sig Dispense Refill    cholecalciferol, vitamin D3, 125 mcg (5,000 unit) capsule       conjugated estrogens (PREMARIN) vaginal cream Place 1 g vaginally twice a week.      docusate sodium (COLACE) 100 MG capsule       indomethacin (INDOCIN SR) 75 mg CpSR CR capsule       losartan (COZAAR) 25 MG tablet       methocarbamoL (ROBAXIN) 500 MG Tab Take 500 mg by mouth 3 (three) times daily.      mupirocin (BACTROBAN) 2 % ointment SMARTSI Application Topical 2-3 Times Daily      pilocarpine (SALAGEN) 5 MG Tab       polyethylene glycol (GLYCOLAX) 17 gram/dose powder Take 17 g by mouth once daily.      amLODIPine (NORVASC) 10 MG tablet Take 1 tablet (10 mg total) by mouth once daily. 30 tablet 0    DULoxetine (CYMBALTA) 60 MG capsule Take 1 capsule (60 mg total) by mouth once daily. 90 capsule 0    gabapentin (NEURONTIN) 300 MG capsule Take 2 capsules (600 mg total) by mouth 3 (three) times daily. 180 capsule 0    omega 3-dha-epa-fish oil 1,000 mg (120 mg-180 mg) Cap Take 2 capsules by mouth Daily. 180 capsule 0     omeprazole (PRILOSEC) 20 MG capsule Take 1 capsule (20 mg total) by mouth once daily. 30 capsule 0    triamterene-hydrochlorothiazide 37.5-25 mg (MAXZIDE-25) 37.5-25 mg per tablet Take 1 tablet by mouth every morning. 30 tablet 2     No current facility-administered medications on file prior to visit.       Review of patient's allergies indicates:   Allergen Reactions    Sulfa (sulfonamide antibiotics) Itching and Rash        Objective:     Vitals:  Vitals:    01/26/23 0935   BP: (!) 140/88   Pulse: 68   Resp: 18   Temp: 97.6 °F (36.4 °C)       Body mass index is 40.93 kg/m².     Physical Exam:  General: The patient is awake, alert and oriented times three. The patient is well nourished and in no acute distress.    Musculoskeletal: The patient has a normal range of motion of her bilateral upper extremities.    Breast: The incision is healed. No tenderness, swelling, or redness.   Integumentary: no rashes or skin lesions present  Neurologic: cranial nerves intact, no signs of peripheral neurological deficit, motor/sensory function intact      Assessment and Plan:          Marina was seen today for follow-up.    Diagnoses and all orders for this visit:    History of nipple discharge    Screening mammogram for breast cancer  -     Mammo Digital Screening Bilat w/ Patrick; Future        Plan:       [place] [facility] [place]    2. RTC PRN     3. She was advised to call if she develops any questions or breast concerns (such as nipple discharge recurrence).    4. Ordered next years SCR MG. Dr. Santos, GYN to order future MGS. Patient to continue to follow with him. Please send note and imaging to his office.     5. Please call our office with any questions or concerns that may arise before the next appointment.     All of her questions were answered.     ZEYAD Wallace    ---------------------------------------------------------------------------------------------------------------    Total time on the  date of the visit ranged from 20-29 mins (31024). Total time includes both face-to-face and non-face-to-face time personally spent by myself on the day of the visit.    Non-face-to-face time included:  _X_ preparing to see the patient such as reviewing the patient record  __ obtaining and reviewing separately obtained history  _X_ independently interpreting results  _X_ documenting clinical information in electronic health record.    Face-to-face time included:  _X_ performing an appropriate history and examination  _X_ communicating results to the patient  _X_ counseling and educating the patient  __ ordering appropriate medications  __ ordering appropriate tests  _X_ ordering appropriate procedures (including follow-up)  _X_ answering any questions the patient had    Total Time spent on date of visit: 25 minutes

## 2023-01-26 ENCOUNTER — OFFICE VISIT (OUTPATIENT)
Dept: SURGERY | Facility: CLINIC | Age: 63
End: 2023-01-26
Payer: COMMERCIAL

## 2023-01-26 VITALS
WEIGHT: 253.63 LBS | OXYGEN SATURATION: 97 % | HEART RATE: 68 BPM | SYSTOLIC BLOOD PRESSURE: 140 MMHG | RESPIRATION RATE: 18 BRPM | TEMPERATURE: 98 F | HEIGHT: 66 IN | BODY MASS INDEX: 40.76 KG/M2 | DIASTOLIC BLOOD PRESSURE: 88 MMHG

## 2023-01-26 DIAGNOSIS — Z12.31 SCREENING MAMMOGRAM FOR BREAST CANCER: ICD-10-CM

## 2023-01-26 DIAGNOSIS — Z87.898 HISTORY OF NIPPLE DISCHARGE: Primary | ICD-10-CM

## 2023-01-26 PROCEDURE — 99999 PR PBB SHADOW E&M-EST. PATIENT-LVL V: ICD-10-PCS | Mod: PBBFAC,,,

## 2023-01-26 PROCEDURE — 3079F PR MOST RECENT DIASTOLIC BLOOD PRESSURE 80-89 MM HG: ICD-10-PCS | Mod: CPTII,S$GLB,,

## 2023-01-26 PROCEDURE — 3008F BODY MASS INDEX DOCD: CPT | Mod: CPTII,S$GLB,,

## 2023-01-26 PROCEDURE — 3079F DIAST BP 80-89 MM HG: CPT | Mod: CPTII,S$GLB,,

## 2023-01-26 PROCEDURE — 3008F PR BODY MASS INDEX (BMI) DOCUMENTED: ICD-10-PCS | Mod: CPTII,S$GLB,,

## 2023-01-26 PROCEDURE — 99213 OFFICE O/P EST LOW 20 MIN: CPT | Mod: S$GLB,,,

## 2023-01-26 PROCEDURE — 3077F SYST BP >= 140 MM HG: CPT | Mod: CPTII,S$GLB,,

## 2023-01-26 PROCEDURE — 99213 PR OFFICE/OUTPT VISIT, EST, LEVL III, 20-29 MIN: ICD-10-PCS | Mod: S$GLB,,,

## 2023-01-26 PROCEDURE — 1159F MED LIST DOCD IN RCRD: CPT | Mod: CPTII,S$GLB,,

## 2023-01-26 PROCEDURE — 99999 PR PBB SHADOW E&M-EST. PATIENT-LVL V: CPT | Mod: PBBFAC,,,

## 2023-01-26 PROCEDURE — 3077F PR MOST RECENT SYSTOLIC BLOOD PRESSURE >= 140 MM HG: ICD-10-PCS | Mod: CPTII,S$GLB,,

## 2023-01-26 PROCEDURE — 1159F PR MEDICATION LIST DOCUMENTED IN MEDICAL RECORD: ICD-10-PCS | Mod: CPTII,S$GLB,,

## 2023-01-26 RX ORDER — DOCUSATE SODIUM 100 MG/1
CAPSULE, LIQUID FILLED ORAL
COMMUNITY
Start: 2022-02-22

## 2023-01-26 RX ORDER — LOSARTAN POTASSIUM 25 MG/1
TABLET ORAL
COMMUNITY
Start: 2022-12-19

## 2023-01-26 RX ORDER — CHOLECALCIFEROL (VITAMIN D3) 125 MCG
CAPSULE ORAL
COMMUNITY

## 2023-01-26 RX ORDER — PILOCARPINE HYDROCHLORIDE 5 MG/1
TABLET, FILM COATED ORAL
COMMUNITY

## 2023-01-26 RX ORDER — INDOMETHACIN 75 MG/1
CAPSULE, EXTENDED RELEASE ORAL
COMMUNITY
Start: 2022-11-14

## 2023-01-26 RX ORDER — MUPIROCIN 20 MG/G
OINTMENT TOPICAL
COMMUNITY
Start: 2022-12-21

## 2023-01-26 RX ORDER — METHOCARBAMOL 500 MG/1
500 TABLET, FILM COATED ORAL 3 TIMES DAILY
COMMUNITY
Start: 2023-01-16

## 2023-01-26 RX ORDER — POLYETHYLENE GLYCOL 3350 17 G/17G
17 POWDER, FOR SOLUTION ORAL DAILY
COMMUNITY

## 2023-06-26 DIAGNOSIS — M51.16 NEURITIS OR RADICULITIS DUE TO RUPTURE OF LUMBAR INTERVERTEBRAL DISC: Primary | ICD-10-CM

## 2023-07-06 ENCOUNTER — HOSPITAL ENCOUNTER (OUTPATIENT)
Dept: RADIOLOGY | Facility: HOSPITAL | Age: 63
Discharge: HOME | End: 2023-07-06
Attending: NEUROLOGICAL SURGERY
Payer: MEDICARE

## 2023-07-06 DIAGNOSIS — M51.16 NEURITIS OR RADICULITIS DUE TO RUPTURE OF LUMBAR INTERVERTEBRAL DISC: ICD-10-CM

## 2023-07-06 DIAGNOSIS — M51.16 DISPLACEMENT OF LUMBAR DISC WITH RADICULOPATHY: ICD-10-CM

## 2023-07-06 PROCEDURE — 72148 MRI LUMBAR SPINE W/O DYE: CPT | Mod: TC

## 2023-07-06 PROCEDURE — 72114 X-RAY EXAM L-S SPINE BENDING: CPT | Mod: TC

## 2024-01-10 ENCOUNTER — HOSPITAL ENCOUNTER (OUTPATIENT)
Dept: RADIOLOGY | Facility: HOSPITAL | Age: 64
Discharge: HOME | End: 2024-01-10
Payer: MEDICARE

## 2024-01-10 DIAGNOSIS — Z12.31 SCREENING MAMMOGRAM FOR BREAST CANCER: ICD-10-CM

## 2024-01-10 PROCEDURE — 77067 SCR MAMMO BI INCL CAD: CPT | Mod: 26,,, | Performed by: STUDENT IN AN ORGANIZED HEALTH CARE EDUCATION/TRAINING PROGRAM

## 2024-01-10 PROCEDURE — 77067 SCR MAMMO BI INCL CAD: CPT | Mod: TC

## 2024-01-10 PROCEDURE — 77063 BREAST TOMOSYNTHESIS BI: CPT | Mod: 26,,, | Performed by: STUDENT IN AN ORGANIZED HEALTH CARE EDUCATION/TRAINING PROGRAM

## 2024-10-14 DIAGNOSIS — Z12.31 OTHER SCREENING MAMMOGRAM: Primary | ICD-10-CM

## 2025-01-14 ENCOUNTER — HOSPITAL ENCOUNTER (OUTPATIENT)
Dept: RADIOLOGY | Facility: HOSPITAL | Age: 65
Discharge: HOME | End: 2025-01-14
Attending: FAMILY MEDICINE
Payer: MEDICARE

## 2025-01-14 DIAGNOSIS — Z12.31 OTHER SCREENING MAMMOGRAM: ICD-10-CM

## 2025-01-14 PROCEDURE — 77063 BREAST TOMOSYNTHESIS BI: CPT | Mod: TC

## 2025-01-14 PROCEDURE — 77067 SCR MAMMO BI INCL CAD: CPT | Mod: 26,,, | Performed by: STUDENT IN AN ORGANIZED HEALTH CARE EDUCATION/TRAINING PROGRAM

## 2025-01-14 PROCEDURE — 77063 BREAST TOMOSYNTHESIS BI: CPT | Mod: 26,,, | Performed by: STUDENT IN AN ORGANIZED HEALTH CARE EDUCATION/TRAINING PROGRAM

## 2025-02-19 DIAGNOSIS — M35.00 SJOGREN'S DISEASE: Primary | ICD-10-CM

## 2025-02-19 DIAGNOSIS — R76.8 POSITIVE ANA (ANTINUCLEAR ANTIBODY): ICD-10-CM

## 2025-02-24 ENCOUNTER — HOSPITAL ENCOUNTER (OUTPATIENT)
Dept: RADIOLOGY | Facility: HOSPITAL | Age: 65
Discharge: HOME | End: 2025-02-24
Attending: NURSE PRACTITIONER
Payer: MEDICARE

## 2025-02-24 DIAGNOSIS — M35.00 SJOGREN'S DISEASE: ICD-10-CM

## 2025-02-24 DIAGNOSIS — R76.8 POSITIVE ANA (ANTINUCLEAR ANTIBODY): ICD-10-CM

## 2025-02-24 PROCEDURE — 71250 CT THORAX DX C-: CPT | Mod: TC

## 2025-03-25 NOTE — PLAN OF CARE
Medicare Wellness Visit  Plan for Preventive Care    A good way for you to stay healthy is to use preventive care.  Medicare covers many services that can help you stay healthy.* The goal of these services is to find any health problems as quickly as possible. Finding problems early can help make them easier to treat.  Your personal plan below lists the services you may need and when they are due.      Health Maintenance Summary     DTaP/Tdap/Td Vaccine (1 - Tdap)  Overdue since 6/7/2009    Medicare Advantage- Medicare Wellness Visit (Yearly - January to December)  Due since 1/1/2025    COVID-19 Vaccine (4 - 2024-25 season)  Postponed until 3/26/2025    Respiratory Syncytial Virus (RSV) Vaccine 60+ (1 - 1-dose 75+ series)  Next due on 9/26/2025    Depression Screening (Yearly)  Next due on 3/25/2026    Breast Cancer Screening (Every 2 Years)  Next due on 10/7/2026    Colorectal Cancer Screening (Colonoscopy - Every 10 Years)  Next due on 2/11/2031    Osteoporosis Screening   Completed    Hepatitis C Screening   Completed    Pneumococcal Vaccine 50+   Completed    Shingles Vaccine   Completed    Influenza Vaccine   Completed    Hepatitis A Vaccine   Aged Out    Meningococcal Vaccine   Aged Out    Hepatitis B Vaccine (For Physician/APC Discussion)   Aged Out    Meningococcal Serogroup B Vaccine   Aged Out    HPV Vaccine   Aged Out           Preventive Care for Women and Men    Heart Screenings (Cardiovascular):  Blood tests are used to check your cholesterol, lipid and triglyceride levels. High levels can increase your risk for heart disease and stroke. High levels can be treated with medications, diet and exercise. Lowering your levels can help keep your heart and blood vessels healthy.  Your provider will order these tests if they are needed.    An ultrasound is done to see if you have an abdominal aortic aneurysm (AAA).  This is an enlargement of one of the main blood vessels that delivers blood to the body.   In  Pt. Resting comfortably, VSS, no s/s distress, Rene at acceptable level for transfer to phase II, Criteria met for anesthesia release per Dr. Seals.   the [place], 9,000 deaths are caused by AAA.  You may not even know you have this problem and as many as 1 in 3 people will have a serious problem if it is not treated.  Early diagnosis allows for more effective treatment and cure.  If you have a family history of AAA or are a male age 65-75 who has smoked, you are at higher risk of an AAA.  Your provider can order this test, if needed.    Colorectal Screening:  There are many tests that are used to check for cancer of your colon and rectum. You and your provider should discuss what test is best for you and when to have it done.  Options include:  Screening Colonoscopy: exam of the entire colon, seen through a flexible lighted tube.  Flexible Sigmoidoscopy: exam of the last third (sigmoid portion) of the colon and rectum, seen through a flexible lighted tube.  Cologuard DNA stool test: a sample of your stool is used to screen for cancer and unseen blood in your stool.  Fecal Occult Blood Test: a sample of your stool is studied to find any unseen blood    Flu Shot:  An immunization that helps to prevent influenza (the flu). You should get this every year. The best time to get the shot is in the fall.    Pneumococcal Shot:  Vaccines help prevent pneumococcal disease, which is any type of illness caused by Streptococcus pneumoniae bacteria. There are two kinds of pneumococcal vaccines available in the [place]:   Pneumococcal conjugate vaccines (PCV20 or Jesiasj02®)  Pneumococcal polysaccharide vaccine (PPSV23 or Azopitcmt83®)  For those who have never received any pneumococcal conjugate vaccine, CDC recommends PVC20 for adults 65 years or older and adults 19 through 64 years old with certain medical conditions or risk factors.   For those who have previously received PCV13, this should be followed by a dose of PPSV23.     Hepatitis B Shot:  An immunization that helps to protect people from getting Hepatitis B. Hepatitis B is a virus that spreads through  contact with infected blood or body fluids. Many people with the virus do not have symptoms.  The virus can lead to serious problems, such as liver disease. Some people are at higher risk than others. Your doctor will tell you if you need this shot.     Diabetes Screening:  A test to measure sugar (glucose) in your blood is called a fasting blood sugar. Fasting means you cannot have food or drink for at least 8 hours before the test. This test can detect diabetes long before you may notice symptoms.    Glaucoma Screening:  Glaucoma screening is performed by your eye doctor. The test measures the fluid pressure inside your eyes to determine if you have glaucoma.     Hepatitis C Screening:  A blood test to see if you have the hepatitis C virus.  Hepatitis C attacks the liver and is a major cause of chronic liver disease.  Medicare will cover a single screening for all adults born between 1945 & 1965, or high risk patients (people who have injected illegal drugs or people who have had blood transfusions).  High risk patients who continue to inject illegal drugs can be screened for Hepatitis C every year.    Smoking and Tobacco-Use Cessation Counseling:  Tobacco is the single greatest cause of disease and early death in our country today. Medication and counseling together can increase a person’s chance of quitting for good.   Medicare covers two quitting attempts per year, with four counseling sessions per attempt (eight sessions in a 12 month period)    Preventive Screening tests for Women    Screening Mammograms and Breast Exams:  An x-ray of your breasts to check for breast cancer before you or your doctor may be able to feel it.  If breast cancer is found early it can usually be treated with success.    Pelvic Exams and Pap Tests:  An exam to check for cervical and vaginal cancer. A Pap test is a lab test in which cells are taken from your cervix and sent to the lab to look for signs of cervical cancer. If cancer  of the cervix is found early, chances for a cure are good. Testing can generally end at age 65, or if a woman has a hysterectomy for a benign condition. Your provider may recommend more frequent testing if certain abnormal results are found.    Bone Mass Measurements:  A painless x-ray of your bone density to see if you are at risk for a broken bone. Bone density refers to the thickness of bones or how tightly the bone tissue is packed.    Preventive Screening tests for Men    Prostate Screening:  Should you have a prostate cancer test (PSA)?  It is up to you to decide if you want a prostate cancer test. Talk to your clinician to find out if the test is right for you.  Things for you to consider and talk about should include:  Benefits and harms of the test  Your family history  How your race/ethnicity may influence the test  If the test may impact other medical conditions you have  Your values on screenings and treatments    *Medicare pays for many preventive services to keep you healthy. For some of these services, you might have to pay a deductible, coinsurance, and / or copayment.  The amounts vary depending on the type of services you need and the kind of Medicare health plan you have.    For further details on screenings offered by Medicare please visit: https://www.medicare.gov/coverage/preventive-screening-services

## 2025-08-11 DIAGNOSIS — Z12.31 OTHER SCREENING MAMMOGRAM: Primary | ICD-10-CM

## 2025-08-12 DIAGNOSIS — Z78.0 POSTMENOPAUSAL: Primary | ICD-10-CM

## 2025-08-12 DIAGNOSIS — M25.551 RIGHT HIP PAIN: Primary | ICD-10-CM

## 2025-08-13 ENCOUNTER — HOSPITAL ENCOUNTER (OUTPATIENT)
Dept: RADIOLOGY | Facility: HOSPITAL | Age: 65
Discharge: HOME | End: 2025-08-13
Attending: FAMILY MEDICINE
Payer: MEDICARE

## 2025-08-13 DIAGNOSIS — M25.551 RIGHT HIP PAIN: ICD-10-CM

## 2025-08-13 PROCEDURE — 73721 MRI JNT OF LWR EXTRE W/O DYE: CPT | Mod: TC,RT

## (undated) DEVICE — NDL HYPO REG 25G X 1 1/2

## (undated) DEVICE — ELECTRODE PATIENT RETURN DISP

## (undated) DEVICE — CLOSURE SKIN STERI STRIP 1/2X4

## (undated) DEVICE — SUT STRATAFIX 4-0 30CM PS-2

## (undated) DEVICE — BENZOIN TINCTURE 0.66ML

## (undated) DEVICE — NDL SYR 10ML 18X1.5 LL BLUNT

## (undated) DEVICE — DRAPE FLUID WARMER ORS 44X44IN

## (undated) DEVICE — GAUZE VISTEC XR DTECT 16 4X4IN

## (undated) DEVICE — COVER C-ARM STRAP BAND 44X80IN

## (undated) DEVICE — SHEET AVIENE MICFIB 1.4X1.4IN

## (undated) DEVICE — DRAPE ORTH SPLIT 77X108IN

## (undated) DEVICE — ADHESIVE DERMABOND ADVANCED

## (undated) DEVICE — TUBING SILICON CLR 3/16IN 10FT

## (undated) DEVICE — INSTRUMENT FRAZIER 10FR W/VENT

## (undated) DEVICE — KIT POS JACKSON TABLE NO HDRST

## (undated) DEVICE — SUT VICRYL PLUS 3-0 X-1 18IN

## (undated) DEVICE — TIP KERRISON RONGEUR SHARP 4MM

## (undated) DEVICE — KIT SURGICAL TURNOVER

## (undated) DEVICE — SUT 2/0 30IN SILK BLK BRAI

## (undated) DEVICE — Device

## (undated) DEVICE — DRAPE C-ARMOR EQUIPMENT COVER

## (undated) DEVICE — SYR BULB IRRIG ST 60 LF

## (undated) DEVICE — DISH PETRI MED 3.5IN

## (undated) DEVICE — SUPPORT ULNA NERVE PROTECTOR

## (undated) DEVICE — ELECTRODE BLADE E-Z CLEAN 4IN

## (undated) DEVICE — GOWN X-LG STERILE BACK

## (undated) DEVICE — TIP KERRISON RONGEUR SHARP 2MM

## (undated) DEVICE — SUT VICRYL 4-0 18 P-3

## (undated) DEVICE — DRESSING TELFA N ADH 3X8IN

## (undated) DEVICE — DRESSING TRANS 4X4 TEGADERM

## (undated) DEVICE — ELECTRODE BLD EXT 6.50 ST DISP

## (undated) DEVICE — DRAPE TOP 53X102IN

## (undated) DEVICE — DRAIN ROUND SUCTION 10FR

## (undated) DEVICE — GAUZE SPONGE 4X4 12PLY

## (undated) DEVICE — BLADE SURG STAINLESS STEEL #15

## (undated) DEVICE — DRAPE SURG W/TWL 17 5/8X23

## (undated) DEVICE — SUT VICRYL PLUS 0 CT-1 18IN

## (undated) DEVICE — GOWN SMARTSLEEVE AAMI LVL4 LG

## (undated) DEVICE — TIP KERRISON RONGEUR SHARP 3MM

## (undated) DEVICE — GLOVE PROTEXIS PI SYN SURG 6.0

## (undated) DEVICE — GLOVE PROTEXIS PI SYN SURG 6.5

## (undated) DEVICE — GLOVE PROTEXIS BLUE LATEX 7

## (undated) DEVICE — SOL IRRI STRL WATER 1000ML

## (undated) DEVICE — KIT SURGIFLO HEMOSTATIC MATRIX

## (undated) DEVICE — TRAY CATH FOL SIL URIMTR 16FR

## (undated) DEVICE — TIP KERRISON RONGEUR SHARP 1MM

## (undated) DEVICE — DRAPE OPMI STERILE

## (undated) DEVICE — GLOVE PROTEXIS BLUE LATEX 8

## (undated) DEVICE — FLEXFIT ULTRA-HIGH COVERAGE BRA

## (undated) DEVICE — GLOVE PROTEXIS LTX MICRO  7.5

## (undated) DEVICE — BUR BONE CUT MICRO TPS 3X3.8MM

## (undated) DEVICE — TUBE SUCTION MEDI-VAC STERILE

## (undated) DEVICE — GLOVE 6.0 PROTEXIS PI MICRO

## (undated) DEVICE — RESERVOIR JACKSON-PRATT 100CC

## (undated) DEVICE — NDL HYPO 22GX1 1/2 SYR 10ML LL

## (undated) DEVICE — PILLOW HEAD REST